# Patient Record
Sex: FEMALE | Race: BLACK OR AFRICAN AMERICAN | Employment: UNEMPLOYED | URBAN - METROPOLITAN AREA
[De-identification: names, ages, dates, MRNs, and addresses within clinical notes are randomized per-mention and may not be internally consistent; named-entity substitution may affect disease eponyms.]

---

## 2017-01-03 ENCOUNTER — HOSPITAL ENCOUNTER (OUTPATIENT)
Dept: MAMMOGRAPHY | Age: 57
Discharge: HOME | End: 2017-01-03
Attending: PHYSICIAN ASSISTANT
Payer: MEDICARE

## 2017-01-03 DIAGNOSIS — M85.80 OSTEOPENIA: ICD-10-CM

## 2017-01-03 DIAGNOSIS — F11.988 OPIOID USE, UNSPECIFIED WITH OTHER OPIOID-INDUCED DISORDER (HCC): ICD-10-CM

## 2017-01-03 PROCEDURE — 77080 DXA BONE DENSITY AXIAL: CPT

## 2017-02-21 ENCOUNTER — OFFICE VISIT (OUTPATIENT)
Dept: PAIN MANAGEMENT | Age: 57
End: 2017-02-21

## 2017-02-21 VITALS — HEART RATE: 66 BPM | SYSTOLIC BLOOD PRESSURE: 175 MMHG | DIASTOLIC BLOOD PRESSURE: 88 MMHG

## 2017-02-21 DIAGNOSIS — M54.12 CERVICAL RADICULOPATHY: ICD-10-CM

## 2017-02-21 DIAGNOSIS — M48.02 SPINAL STENOSIS IN CERVICAL REGION: ICD-10-CM

## 2017-02-21 DIAGNOSIS — G89.4 CHRONIC PAIN SYNDROME: Primary | ICD-10-CM

## 2017-02-21 DIAGNOSIS — M50.30 DEGENERATION OF CERVICAL INTERVERTEBRAL DISC: ICD-10-CM

## 2017-02-21 DIAGNOSIS — M47.22 OSTEOARTHRITIS OF SPINE WITH RADICULOPATHY, CERVICAL REGION: ICD-10-CM

## 2017-03-07 ENCOUNTER — OFFICE VISIT (OUTPATIENT)
Dept: PAIN MANAGEMENT | Age: 57
End: 2017-03-07

## 2017-03-07 VITALS
DIASTOLIC BLOOD PRESSURE: 93 MMHG | SYSTOLIC BLOOD PRESSURE: 158 MMHG | HEIGHT: 71 IN | BODY MASS INDEX: 31.64 KG/M2 | HEART RATE: 87 BPM | WEIGHT: 226 LBS

## 2017-03-07 DIAGNOSIS — M54.81 BILATERAL OCCIPITAL NEURALGIA: ICD-10-CM

## 2017-03-07 DIAGNOSIS — M47.22 OSTEOARTHRITIS OF SPINE WITH RADICULOPATHY, CERVICAL REGION: ICD-10-CM

## 2017-03-07 DIAGNOSIS — G24.3 SPASMODIC TORTICOLLIS: ICD-10-CM

## 2017-03-07 DIAGNOSIS — M54.12 CERVICAL RADICULOPATHY: ICD-10-CM

## 2017-03-07 DIAGNOSIS — M50.30 DEGENERATION OF CERVICAL INTERVERTEBRAL DISC: ICD-10-CM

## 2017-03-07 DIAGNOSIS — G24.3 CERVICAL DYSTONIA: ICD-10-CM

## 2017-03-07 DIAGNOSIS — M62.838 MUSCLE SPASMS OF NECK: ICD-10-CM

## 2017-03-07 DIAGNOSIS — M54.2 CERVICALGIA: Primary | ICD-10-CM

## 2017-03-07 DIAGNOSIS — M54.12 BRACHIAL NEURITIS: ICD-10-CM

## 2017-03-07 RX ORDER — FENTANYL 25 UG/1
1 PATCH TRANSDERMAL
Qty: 15 PATCH | Refills: 0 | Status: SHIPPED | OUTPATIENT
Start: 2017-05-13 | End: 2017-06-02 | Stop reason: SDUPTHER

## 2017-03-07 RX ORDER — FENTANYL 25 UG/1
1 PATCH TRANSDERMAL
Qty: 15 PATCH | Refills: 0 | Status: SHIPPED | OUTPATIENT
Start: 2017-04-14 | End: 2017-03-07 | Stop reason: SDUPTHER

## 2017-03-07 RX ORDER — BUPIVACAINE HYDROCHLORIDE 5 MG/ML
3 INJECTION, SOLUTION EPIDURAL; INTRACAUDAL
Qty: 3 ML | Refills: 0
Start: 2017-03-07 | End: 2017-03-07

## 2017-03-07 RX ORDER — FENTANYL 100 UG/H
1 PATCH TRANSDERMAL
Qty: 15 PATCH | Refills: 0 | Status: SHIPPED | OUTPATIENT
Start: 2017-03-16 | End: 2017-03-07 | Stop reason: SDUPTHER

## 2017-03-07 RX ORDER — FENTANYL 25 UG/1
1 PATCH TRANSDERMAL
Qty: 15 PATCH | Refills: 0 | Status: SHIPPED | OUTPATIENT
Start: 2017-03-16 | End: 2017-03-07 | Stop reason: SDUPTHER

## 2017-03-07 RX ORDER — FENTANYL 100 UG/H
1 PATCH TRANSDERMAL
Qty: 15 PATCH | Refills: 0 | Status: SHIPPED | OUTPATIENT
Start: 2017-04-14 | End: 2017-03-07 | Stop reason: SDUPTHER

## 2017-03-07 RX ORDER — OXYCODONE AND ACETAMINOPHEN 10; 325 MG/1; MG/1
1 TABLET ORAL
Qty: 120 TAB | Refills: 0 | Status: SHIPPED | OUTPATIENT
Start: 2017-05-13 | End: 2017-06-02 | Stop reason: SDUPTHER

## 2017-03-07 RX ORDER — FENTANYL 100 UG/H
1 PATCH TRANSDERMAL
Qty: 15 PATCH | Refills: 0 | Status: SHIPPED | OUTPATIENT
Start: 2017-05-13 | End: 2017-06-02 | Stop reason: SDUPTHER

## 2017-03-07 RX ORDER — OXYCODONE AND ACETAMINOPHEN 10; 325 MG/1; MG/1
1 TABLET ORAL
Qty: 120 TAB | Refills: 0 | Status: SHIPPED | OUTPATIENT
Start: 2017-04-14 | End: 2017-03-07 | Stop reason: SDUPTHER

## 2017-03-07 RX ORDER — OXYCODONE AND ACETAMINOPHEN 10; 325 MG/1; MG/1
1 TABLET ORAL
Qty: 120 TAB | Refills: 0 | Status: SHIPPED | OUTPATIENT
Start: 2017-03-16 | End: 2017-03-07 | Stop reason: SDUPTHER

## 2017-03-07 NOTE — PATIENT INSTRUCTIONS
Plan:  Continue same medications as prescribed for chronic pain  Trigger point injection performed today for muscle spasms in the neck and shoulders  Weight bearing exercise and a diet rich in calcium and vitamin D are the best (non-pharmacologic) ways to slow bone loss and reduce the risk of fracture  We will arrange a repeat cervical MARILOU with Dr. Vega Farnsworth   Follow up in 3 months or sooner if needed  Regular exercise and attention to emotional health and diet remain the most effective ways to treat chronic pain of all kinds  You may contact me with questions or concerns through [employer]    Post Injection Instructions    If you develop any abnormal symptoms, such as itching, swelling, redness, rash, or shortness of breath, please call our office. Normally, these are temporary symptoms, which resolve within several hours to a day, but our office is more than happy to answer any questions you may have. The provider would like you to observe the injection area for redness, swelling, or increased heat. If any or all of these reactions occur, please call our office as soon as possible. This reaction may indicate the first signs of infection. Although very rare, it is  best if caught early. I have reviewed these instructions and the patient verbalizes understanding.

## 2017-03-07 NOTE — PROGRESS NOTES
Nursing Notes    Patient presents to the office today in follow-up. Patient rates her pain at 8/10 on the numerical pain scale. Reviewed medications with counts as follows:    Rx Date filled Qty Dispensed Pill Count Last Dose Short   Fentanyl 100 mcg/hr 02/15/17 15 5 Current patch on right chest wall no   Fentanyl 25 mcg/hr 02/15/17 15 5 Current patch on right chest wall no   Percocet 10/325 mg  02/15/17 120 37 today no                  POC UDS was not performed in office today    Any new labs or imaging since last appointment? NO    Have you been to an emergency room (ER) or urgent care clinic since your last visit? NO            Have you been hospitalized since your last visit? NO     If yes, where, when, and reason for visit? Have you seen or consulted any other health care providers outside of the [address] [place] [address]  since your last visit? YES     If yes, where, when, and reason for visit?pcp    HM deferred to pcp.

## 2017-03-07 NOTE — PROGRESS NOTES
HISTORY OF PRESENT ILLNESS  Nubia Montanez is a 61 y.o. female. . Patient presents for follow up of chronic neck pain due to cervical spondylosis and stenosis secondary to an industrial accident sustained several years ago. She complains of a 6-week history of left knee pain and stiffness. She consulted with her PCP, who performed x-rays and discovered mild to moderate knee arthritis but did not propose any treatment because she is prescribed pain medications from our practice. She is being referred to an orthopedist. We are not permitted to address this pain complaint as it is not related to work injury. We discussed her recent DEXA, which showed osteopenia (T-score of -1.2 to -1.5). We discussed ways to reduce the likelihood of fracture and to slow bone loss ( weight-bearing exercise, dietary advice, etc). We will recheck in about 2 years. She has also been under a great deal of stress related to her aunt in [place], who is suffering with a number of health crises. She has traveled back and forth to [place] to help care for her, which has worsened her pain. She also complains of worsening neck pain and spasms due to cervical spondylosis and cervical dystonia. She requests to schedule a repeat cervical MARILOU, which has provided 50% relief for about a month. She does not wish to proceed with spinal cord stimulator at this time, but we discussed that this may be the most successful option for addressing her severe, chronic neck pain if epidurals, PT, medications, and TPIs do not control symptoms. She requests repeat TPIs today, which have been helpful in the past for spasms and neck stiffness. Pt reports average of 8/10 pain scale most days. Medications continue to offer marginal benefit for pain. Nubia Montanez is tolerating medications well, with no untoward side effects noted. Sheis able to stay more active with less discomfort with these current doses.  The patient reports an average of 50 % relief with this regimen. Most recent UDS and  were consistent with prescribed medications. Pill counts are appropriate. Chris is informed of side effects, risks, and benefits of this regimen, and emphasizes that she derives a significant improvement in functionality and quality of life, and notes that non-opioid medications and therapies in the past have not offered significant benefit. We discussed weaning on her regimen, but she declines, stating, \"this is the best thing I've ever had for my pain. \"  She denies new or worsening insomnia or constipation issues. She denies any falls, injuries, or hospitalizations since the last visit. A total of 50 minutes was spent with the patient of which more than 50% of the time was spent counseling the patient. [facility]  OFFICE PROCEDURE PROGRESS NOTE        Chart reviewed for the following:   Jasmin ALEMAN PA-C, have reviewed the History, Physical and updated the Allergic reactions for [address] [place] [address] Christian performed immediately prior to start of procedure:   Guicho ALEMAN PA-C, have performed the following reviews on [facility] prior to the start of the procedure:            * Patient was identified by name and date of birth   * Agreement on procedure being performed was verified  * Risks and Benefits explained to the patient  * Procedure site verified and marked as necessary  * Patient was positioned for comfort  * Consent was signed and verified     Time: 0899      Date of procedure: 3/7/2017    Procedure performed by:  Guicho Sheikh PA-C    Provider assisted by: Ramona Trejo LPN    Patient assisted by: self    How tolerated by patient: tolerated the procedure well with no complications    Post Procedural Pain Scale: 6 - Hurts Even More    Comments: none    PAIN SCALE PRIOR TO PROCEDURE: 7-8/10    Pt presents for trigger point injections due to neck pain and chronic migraines.  Procedure was explained in detail and all questions answered to the satisfaction of the patient. Consent forms were signed by the patient. Attention was first directed to the occipital prominences. Approximately 1.5 ml of bupivicaine 0.5% instilled into each these areas using 30 g 1 \" needle. Next, approximately 2 ml was injected into the cervical paraspinous muscles in four discrete areas. Next, approximately 10 ml of bupivicaine was injected into the trapezius muscles at areas of palpable muscle nodularity. Pt tolerated the procedure well. PAIN SCALE AFTER PROCEDURE: 6/10       HPI--see above    ROS  ENT: Positive for neck pain (s/p cervical MARILOU, >50% relief ). Respiratory: Negative. Cardiovascular: Negative. Gastrointestinal: Negative for nausea, vomiting and constipation (controlled with Amitiza ). Genitourinary: Negative. Musculoskeletal: Positive for back pain (thoracic ) and joint pain. Negative for myalgias and falls. Muscle cramps (in lower legs)    Psychiatric/Behavioral: Positive for depression and anxiety, negative for suicidal ideas, hallucinations and substance abuse. The patient has insomnia   Physical Exam  Head: Normocephalic and atraumatic. Eyes: Pupils are equal, round, and reactive to light. Right eye exhibits no discharge. Left eye exhibits no discharge. Glasses   Pulmonary/Chest: Effort normal. No respiratory distress. Musculoskeletal: She exhibits tenderness. She exhibits no edema. Cervical back: She exhibits decreased range of motion and tenderness. Back:  palpable areas of muscle nodularity and tightness along the upper ridge of the trapezius muscle on the left  Neurological: She is alert and oriented to person, place, and time. No cranial nerve deficit (grossly intact). Skin: Skin is warm and dry. She is not diaphoretic. No erythema. Psychiatric: She has a normal mood and affect. Her behavior is normal. Judgment and thought content normal.   ASSESSMENT and PLAN    ICD-10-CM ICD-9-CM    1. Cervicalgia M54.2 723.1 AK INJECT TRIGGER POINTS, > 3   2. Cervical dystonia G24.3 333.83 AK INJECT TRIGGER POINTS, > 3      bupivacaine, PF, (MARCAINE, PF,) 0.5 % (5 mg/mL) injection      AK INJECT NERV BLCK,GREAT OCCIPTL   3. Bilateral occipital neuralgia M54.81 723.8 AK INJECT TRIGGER POINTS, > 3      bupivacaine, PF, (MARCAINE, PF,) 0.5 % (5 mg/mL) injection      AK INJECT NERV BLCK,GREAT OCCIPTL   4. Muscle spasms of neck M62.838 728.85 AK INJECT TRIGGER POINTS, > 3      bupivacaine, PF, (MARCAINE, PF,) 0.5 % (5 mg/mL) injection      AK INJECT NERV BLCK,GREAT OCCIPTL   5. Spasmodic torticollis G24.3 333.83    6. Brachial neuritis M54.12 723.4    7. Cervical radiculopathy M54.12 723.4    8. Osteoarthritis of spine with radiculopathy, cervical region M47.22 721.0    9.  Degeneration of cervical intervertebral disc M50.30 722.4       Plan:  Continue same medications as prescribed for chronic pain  Trigger point injection performed today for muscle spasms in the neck and shoulders  Weight bearing exercise and a diet rich in calcium and vitamin D are the best (non-pharmacologic) ways to slow bone loss and reduce the risk of fracture  We will arrange a repeat cervical MARILOU with Dr. Ramona Bower   Follow up in 3 months or sooner if needed  Regular exercise and attention to emotional health and diet remain the most effective ways to treat chronic pain of all kinds  You may contact me with questions or concerns through [employer]

## 2017-03-07 NOTE — MR AVS SNAPSHOT
Visit Information Date & Time Provider Department Dept. Phone Encounter #  
 3/7/2017  1:40 PM [facility] [phone] [id number] Follow-up Instructions Return in about 3 months (around 6/7/2017). Follow-up and Disposition History Upcoming Health Maintenance Date Due Hepatitis C Screening 1960 DTaP/Tdap/Td series (1 - Tdap) 6/29/1980 PAP AKA CERVICAL CYTOLOGY 6/29/1980 BREAST CANCER SCRN MAMMOGRAM 6/29/2009 FOBT Q 1 YEAR AGE 50-75 6/29/2009 INFLUENZA AGE 9 TO ADULT 8/1/2016 Allergies as of 3/7/2017  Review Complete On: 3/7/2017 By: Michael Joyce LPN Severity Noted Reaction Type Reactions Oxycontin [Oxycodone]   Intolerance Not Reported This Time Pt states not allergic to oxycontin Current Immunizations  Never Reviewed No immunizations on file. Not reviewed this visit You Were Diagnosed With   
  
 Codes Comments Cervicalgia    -  Primary ICD-10-CM: M54.2 ICD-9-CM: 723.1 Cervical dystonia     ICD-10-CM: G24.3 ICD-9-CM: 333.83 Bilateral occipital neuralgia     ICD-10-CM: M54.81 ICD-9-CM: 723.8 Muscle spasms of neck     ICD-10-CM: Z30.199 ICD-9-CM: 728.85 Spasmodic torticollis     ICD-10-CM: G24.3 ICD-9-CM: 333.83 Brachial neuritis     ICD-10-CM: M54.12 
ICD-9-CM: 723.4 Cervical radiculopathy     ICD-10-CM: M54.12 
ICD-9-CM: 723.4 Osteoarthritis of spine with radiculopathy, cervical region     ICD-10-CM: M47.22 
ICD-9-CM: 721.0 Degeneration of cervical intervertebral disc     ICD-10-CM: M50.30 ICD-9-CM: 722.4 Vitals BP Pulse Height(growth percentile) Weight(growth percentile) BMI OB Status (!) 158/93 87 5' 11\" (1.803 m) 226 lb (102.5 kg) 31.52 kg/m2 Hysterectomy Smoking Status Never Smoker Vitals History BMI and BSA Data Body Mass Index Body Surface Area 31.52 kg/m 2 2.27 m 2 Preferred Pharmacy Pharmacy Name Phone [facility] - [facility] "[facility] [phone] Your Updated Medication List  
  
   
This list is accurate as of: 3/7/17  2:33 PM.  Always use your most recent med list.  
  
  
  
  
 bupivacaine (PF) 0.5 % (5 mg/mL) injection Commonly known as:  MARCAINE (PF)  
3 mL by Peripheral Nerve Block route now for 1 dose. CRESTOR 10 mg tablet Generic drug:  rosuvastatin Take 10 mg by mouth nightly.  
  
 ergocalciferol 50,000 unit capsule Commonly known as:  ERGOCALCIFEROL Take 1 Cap by mouth as directed. Once  a week * fentaNYL 100 mcg/hr PATCH Commonly known as:  DURAGESIC  
1 Patch by TransDERmal route every fourty-eight (48) hours. Max Daily Amount: 1 Patch. for chronic, severe, refractory pain. Blanca Cartagena brands only * fentaNYL 100 mcg/hr PATCH Commonly known as:  DURAGESIC  
1 Patch by TransDERmal route every fourty-eight (48) hours for 30 days. Max Daily Amount: 1 Patch. for chronic, severe, refractory pain. Valle brands only  Indications: CHRONIC PAIN WITH OPIOID TOLERANCE, SEVERE PAIN WITH OPIOID TOLERANCE  
  
 * fentaNYL 25 mcg/hr PATCH Commonly known as:  DURAGESIC  
1 Patch by TransDERmal route every fourty-eight (48) hours for 30 days. Max Daily Amount: 1 Patch. for chronic, severe, refractory pain. Valle brands only  Indications: CHRONIC PAIN WITH OPIOID TOLERANCE, SEVERE PAIN WITH OPIOID TOLERANCE  
  
 * fentaNYL 25 mcg/hr PATCH Commonly known as:  DURAGESIC  
1 Patch by TransDERmal route every fourty-eight (48) hours for 30 days. Max Daily Amount: 1 Patch. for chronic, severe, refractory pain. Valle brands only  Indications: CHRONIC PAIN WITH OPIOID TOLERANCE, SEVERE PAIN WITH OPIOID TOLERANCE  
  
 * fentaNYL 100 mcg/hr PATCH Commonly known as:  DURAGESIC  
1 Patch by TransDERmal route every fourty-eight (48) hours for 30 days. Max Daily Amount: 1 Patch. for chronic, severe, refractory pain. Valle brands only  Indications: CHRONIC PAIN WITH OPIOID TOLERANCE, SEVERE PAIN WITH OPIOID TOLERANCE  
  
 * fentaNYL 100 mcg/hr PATCH Commonly known as:  DURAGESIC  
1 Patch by TransDERmal route every fourty-eight (48) hours for 30 days. Max Daily Amount: 1 Patch. for chronic, severe, refractory pain. Valle brands only  Indications: Chronic Pain with Opioid Tolerance, SEVERE PAIN WITH OPIOID TOLERANCE Start taking on:  5/13/2017 * fentaNYL 25 mcg/hr PATCH Commonly known as:  DURAGESIC  
1 Patch by TransDERmal route every fourty-eight (48) hours for 30 days. Max Daily Amount: 1 Patch. for chronic, severe, refractory pain. Valle brands only  Indications: Chronic Pain with Opioid Tolerance, SEVERE PAIN WITH OPIOID TOLERANCE Start taking on:  5/13/2017  
  
 naloxegol 25 mg Tab tablet Commonly known as:  Velton Moh Take 1 Tab by mouth daily. Take daily before breakfast for opioid induced constipation  Indications: OPIOID-INDUCED CONSTIPATION  
  
 nebivolol 5 mg tablet Commonly known as:  BYSTOLIC Take 1 Tab by mouth nightly. For migraine prevention and hypertension NORVASC 10 mg tablet Generic drug:  amLODIPine Take  by mouth daily. pantoprazole 40 mg tablet Commonly known as:  PROTONIX Take 2 tabs daily. May take an occasional third tablet as needed. * oxyCODONE-acetaminophen  mg per tablet Commonly known as:  PERCOCET Take 1 Tab by mouth four (4) times daily as needed for Pain for up to 30 days. Max Daily Amount: 4 Tabs. Indications: PAIN  
  
 * PERCOCET  mg per tablet Generic drug:  oxyCODONE-acetaminophen Take 1 Tab by mouth four (4) times daily as needed for Pain for up to 30 days. Max Daily Amount: 4 Tabs. Indications: PAIN  
  
 * oxyCODONE-acetaminophen  mg per tablet Commonly known as:  PERCOCET  
 Take 1 Tab by mouth four (4) times daily as needed for Pain for up to 30 days. Max Daily Amount: 4 Tabs. Indications: Pain Start taking on:  2017 SYNTHROID 175 mcg tablet Generic drug:  levothyroxine Take 200 mcg by mouth Daily (before breakfast). TENS Units Earline Penasarah Commonly known as:  TENS 502  
1 Units by Does Not Apply route as directed. Transparent Dressings 3 1/2 X 4 \" Bndg Commonly known as:  TEGADERM  
2 Patches by Apply Externally route every fourty-eight (48) hours. Dispense two boxes XANAX 0.5 mg tablet Generic drug:  ALPRAZolam  
Take 1 mg by mouth three (3) times daily as needed. Take 1- 1/2 tabs tid prn * Notice: This list has 10 medication(s) that are the same as other medications prescribed for you. Read the directions carefully, and ask your doctor or other care provider to review them with you. Prescriptions Printed Refills  
 fentaNYL (DURAGESIC) 100 mcg/hr PATCH 0 Starting on: 2017 Si Patch by TransDERmal route every fourty-eight (48) hours for 30 days. Max Daily Amount: 1 Patch. for chronic, severe, refractory pain. Valle brands only  Indications: Chronic Pain with Opioid Tolerance, SEVERE PAIN WITH OPIOID TOLERANCE Class: Print Route: TransDERmal  
 fentaNYL (DURAGESIC) 25 mcg/hr PATCH 0 Starting on: 2017 Si Patch by TransDERmal route every fourty-eight (48) hours for 30 days. Max Daily Amount: 1 Patch. for chronic, severe, refractory pain. Valle brands only  Indications: Chronic Pain with Opioid Tolerance, SEVERE PAIN WITH OPIOID TOLERANCE Class: Print Route: TransDERmal  
 oxyCODONE-acetaminophen (PERCOCET)  mg per tablet 0 Starting on: 2017 Sig: Take 1 Tab by mouth four (4) times daily as needed for Pain for up to 30 days. Max Daily Amount: 4 Tabs. Indications: Pain Class: Print Route: Oral  
  
We Performed the Following KS INJECT NERV BLCK,GREAT OCCIPTL [id number] CPT(R)] KS INJECT TRIGGER POINTS, > 3 [id number] CPT(R)] Follow-up Instructions Return in about 3 months (around 6/7/2017). Patient Instructions Plan: 
Continue same medications as prescribed for chronic pain Trigger point injection performed today for muscle spasms in the neck and shoulders Weight bearing exercise and a diet rich in calcium and vitamin D are the best (non-pharmacologic) ways to slow bone loss and reduce the risk of fracture We will arrange a repeat cervical MARILOU with Dr. Jaimee Kerr Follow up in 3 months or sooner if needed Regular exercise and attention to emotional health and diet remain the most effective ways to treat chronic pain of all kinds You may contact me with questions or concerns through [address] Post Injection Instructions If you develop any abnormal symptoms, such as itching, swelling, redness, rash, or shortness of breath, please call our office. Normally, these are temporary symptoms, which resolve within several hours to a day, but our office is more than happy to answer any questions you may have. The provider would like you to observe the injection area for redness, swelling, or increased heat. If any or all of these reactions occur, please call our office as soon as possible. This reaction may indicate the first signs of infection. Although very rare, it is  best if caught early. I have reviewed these instructions and the patient verbalizes understanding. Patient Instructions History Please provide this summary of care documentation to your next provider. Your primary care clinician is listed as Na Waddell. If you have any questions after today's visit, please call [phone].

## 2017-03-21 ENCOUNTER — HOSPITAL ENCOUNTER (OUTPATIENT)
Age: 57
Setting detail: OUTPATIENT SURGERY
Discharge: HOME | End: 2017-03-21
Attending: PHYSICAL MEDICINE & REHABILITATION | Admitting: PHYSICAL MEDICINE & REHABILITATION

## 2017-03-21 ENCOUNTER — APPOINTMENT (OUTPATIENT)
Dept: GENERAL RADIOLOGY | Age: 57
End: 2017-03-21
Attending: EMERGENCY MEDICINE
Payer: MEDICARE

## 2017-03-21 ENCOUNTER — HOSPITAL ENCOUNTER (EMERGENCY)
Age: 57
Discharge: HOME | End: 2017-03-21
Attending: EMERGENCY MEDICINE
Payer: MEDICARE

## 2017-03-21 ENCOUNTER — APPOINTMENT (OUTPATIENT)
Dept: GENERAL RADIOLOGY | Age: 57
End: 2017-03-21
Attending: PHYSICAL MEDICINE & REHABILITATION

## 2017-03-21 ENCOUNTER — SURGERY (OUTPATIENT)
Age: 57
End: 2017-03-21

## 2017-03-21 VITALS
HEART RATE: 62 BPM | SYSTOLIC BLOOD PRESSURE: 124 MMHG | DIASTOLIC BLOOD PRESSURE: 69 MMHG | OXYGEN SATURATION: 94 % | TEMPERATURE: 97.7 F | RESPIRATION RATE: 16 BRPM

## 2017-03-21 VITALS
RESPIRATION RATE: 14 BRPM | HEIGHT: 71 IN | OXYGEN SATURATION: 99 % | WEIGHT: 227 LBS | TEMPERATURE: 98.4 F | DIASTOLIC BLOOD PRESSURE: 110 MMHG | SYSTOLIC BLOOD PRESSURE: 183 MMHG | BODY MASS INDEX: 31.78 KG/M2 | HEART RATE: 59 BPM

## 2017-03-21 DIAGNOSIS — R51.9 HEADACHE, UNSPECIFIED HEADACHE TYPE: Primary | ICD-10-CM

## 2017-03-21 DIAGNOSIS — I10 ESSENTIAL HYPERTENSION: ICD-10-CM

## 2017-03-21 LAB
ALBUMIN SERPL BCP-MCNC: 3.8 G/DL (ref 3.4–5)
ALBUMIN/GLOB SERPL: 1.1 {RATIO} (ref 0.8–1.7)
ALP SERPL-CCNC: 130 U/L (ref 45–117)
ALT SERPL-CCNC: 21 U/L (ref 13–56)
ANION GAP BLD CALC-SCNC: 8 MMOL/L (ref 3–18)
AST SERPL W P-5'-P-CCNC: 22 U/L (ref 15–37)
BASOPHILS # BLD AUTO: 0 K/UL (ref 0–0.1)
BASOPHILS # BLD: 0 % (ref 0–2)
BILIRUB SERPL-MCNC: 0.4 MG/DL (ref 0.2–1)
BUN SERPL-MCNC: 16 MG/DL (ref 7–18)
BUN/CREAT SERPL: 19 (ref 12–20)
CALCIUM SERPL-MCNC: 9 MG/DL (ref 8.5–10.1)
CHLORIDE SERPL-SCNC: 104 MMOL/L (ref 100–108)
CK MB CFR SERPL CALC: 0.5 % (ref 0–4)
CK MB SERPL-MCNC: 1.1 NG/ML (ref 5–25)
CK SERPL-CCNC: 204 U/L (ref 26–192)
CO2 SERPL-SCNC: 29 MMOL/L (ref 21–32)
CREAT SERPL-MCNC: 0.86 MG/DL (ref 0.6–1.3)
DIFFERENTIAL METHOD BLD: NORMAL
EOSINOPHIL # BLD: 0.1 K/UL (ref 0–0.4)
EOSINOPHIL NFR BLD: 1 % (ref 0–5)
ERYTHROCYTE [DISTWIDTH] IN BLOOD BY AUTOMATED COUNT: 12.9 % (ref 11.6–14.5)
GLOBULIN SER CALC-MCNC: 3.5 G/DL (ref 2–4)
GLUCOSE SERPL-MCNC: 102 MG/DL (ref 74–99)
HCT VFR BLD AUTO: 42.3 % (ref 35–45)
HGB BLD-MCNC: 14 G/DL (ref 12–16)
LYMPHOCYTES # BLD AUTO: 41 % (ref 21–52)
LYMPHOCYTES # BLD: 2.5 K/UL (ref 0.9–3.6)
MAGNESIUM SERPL-MCNC: 2.4 MG/DL (ref 1.8–2.4)
MCH RBC QN AUTO: 30.9 PG (ref 24–34)
MCHC RBC AUTO-ENTMCNC: 33.1 G/DL (ref 31–37)
MCV RBC AUTO: 93.4 FL (ref 74–97)
MONOCYTES # BLD: 0.5 K/UL (ref 0.05–1.2)
MONOCYTES NFR BLD AUTO: 8 % (ref 3–10)
NEUTS SEG # BLD: 2.9 K/UL (ref 1.8–8)
NEUTS SEG NFR BLD AUTO: 50 % (ref 40–73)
PLATELET # BLD AUTO: 225 K/UL (ref 135–420)
PMV BLD AUTO: 10.9 FL (ref 9.2–11.8)
POTASSIUM SERPL-SCNC: 3.5 MMOL/L (ref 3.5–5.5)
PROT SERPL-MCNC: 7.3 G/DL (ref 6.4–8.2)
RBC # BLD AUTO: 4.53 M/UL (ref 4.2–5.3)
SODIUM SERPL-SCNC: 141 MMOL/L (ref 136–145)
TROPONIN I SERPL-MCNC: <0.02 NG/ML (ref 0–0.04)
WBC # BLD AUTO: 5.9 K/UL (ref 4.6–13.2)

## 2017-03-21 PROCEDURE — 93005 ELECTROCARDIOGRAM TRACING: CPT

## 2017-03-21 PROCEDURE — 99285 EMERGENCY DEPT VISIT HI MDM: CPT

## 2017-03-21 PROCEDURE — 83735 ASSAY OF MAGNESIUM: CPT | Performed by: EMERGENCY MEDICINE

## 2017-03-21 PROCEDURE — 96365 THER/PROPH/DIAG IV INF INIT: CPT

## 2017-03-21 PROCEDURE — 82550 ASSAY OF CK (CPK): CPT | Performed by: EMERGENCY MEDICINE

## 2017-03-21 PROCEDURE — 80053 COMPREHEN METABOLIC PANEL: CPT | Performed by: EMERGENCY MEDICINE

## 2017-03-21 PROCEDURE — 74011250636 HC RX REV CODE- 250/636: Performed by: EMERGENCY MEDICINE

## 2017-03-21 PROCEDURE — 96375 TX/PRO/DX INJ NEW DRUG ADDON: CPT

## 2017-03-21 PROCEDURE — 71010 XR CHEST SNGL V: CPT

## 2017-03-21 PROCEDURE — 85025 COMPLETE CBC W/AUTO DIFF WBC: CPT | Performed by: EMERGENCY MEDICINE

## 2017-03-21 RX ORDER — FENTANYL CITRATE 50 UG/ML
25-400 INJECTION, SOLUTION INTRAMUSCULAR; INTRAVENOUS
Status: DISCONTINUED | OUTPATIENT
Start: 2017-03-21 | End: 2017-03-21 | Stop reason: HOSPADM

## 2017-03-21 RX ORDER — SODIUM CHLORIDE 0.9 % (FLUSH) 0.9 %
5-10 SYRINGE (ML) INJECTION AS NEEDED
Status: DISCONTINUED | OUTPATIENT
Start: 2017-03-21 | End: 2017-03-21 | Stop reason: HOSPADM

## 2017-03-21 RX ORDER — MAGNESIUM SULFATE HEPTAHYDRATE 40 MG/ML
2 INJECTION, SOLUTION INTRAVENOUS
Status: COMPLETED | OUTPATIENT
Start: 2017-03-21 | End: 2017-03-21

## 2017-03-21 RX ORDER — MIDAZOLAM HYDROCHLORIDE 1 MG/ML
.5-6 INJECTION, SOLUTION INTRAMUSCULAR; INTRAVENOUS
Status: DISCONTINUED | OUTPATIENT
Start: 2017-03-21 | End: 2017-03-21 | Stop reason: HOSPADM

## 2017-03-21 RX ORDER — PROCHLORPERAZINE EDISYLATE 5 MG/ML
10 INJECTION INTRAMUSCULAR; INTRAVENOUS
Status: COMPLETED | OUTPATIENT
Start: 2017-03-21 | End: 2017-03-21

## 2017-03-21 RX ORDER — KETOROLAC TROMETHAMINE 30 MG/ML
30 INJECTION, SOLUTION INTRAMUSCULAR; INTRAVENOUS ONCE
Status: COMPLETED | OUTPATIENT
Start: 2017-03-21 | End: 2017-03-21

## 2017-03-21 RX ADMIN — MAGNESIUM SULFATE HEPTAHYDRATE 2 G: 40 INJECTION, SOLUTION INTRAVENOUS at 12:37

## 2017-03-21 RX ADMIN — KETOROLAC TROMETHAMINE 30 MG: 30 INJECTION, SOLUTION INTRAMUSCULAR at 12:38

## 2017-03-21 RX ADMIN — SODIUM CHLORIDE 1000 ML: 900 INJECTION, SOLUTION INTRAVENOUS at 12:38

## 2017-03-21 RX ADMIN — PROCHLORPERAZINE EDISYLATE 10 MG: 5 INJECTION INTRAMUSCULAR; INTRAVENOUS at 12:38

## 2017-03-21 NOTE — PERIOP NOTES
Pt vital signs taken. Pt blood pressure 183/110. Pt states that she has a headache that is also causing pain in her eyes. Spoke with Dr. Daniel Andino regarding patient's blood pressure and symptoms. Dr. Daniel Andino speaks with patient and requests patient to be seen in the ER. Code green initiated. 911 called.

## 2017-03-21 NOTE — INTERVAL H&P NOTE
H&P Update:  Ismael Humphrey was seen and examined. History and physical has been reviewed. The patient has been examined.  There have been no significant clinical changes since the completion of the originally dated History and Physical.    Signed By: Lorin Tucker MD     March 21, 2017 9:40 AM

## 2017-03-21 NOTE — PROGRESS NOTES
21 Mar 2017, 10:30AM. Called to the holding area of the [facility] procedure suite, patient was awaiting, completing pre-procedure intake. I was notified by staff that pt's /110, she had all over h/a, nausea w/out vomiting, ill appearing. I confirmed all of this. Discussed with pt. She had been having h/a and sx since yesterday. Denies double vision or blurring vision, endorses mild chest pressure, and 'reflux'. Headache 'all over.'  Conversive, although distresed. I instructed her to go accompanied by staff to the [facility] ER for further evaluation.   Escorted via ambulance to [facility] ER.  [facility]

## 2017-03-21 NOTE — PERIOP NOTES
Pt being escorted from the [facility] with EMT staff to the emergency department. Last vitals taken were pulse 72, blood pressure 195/94, and SPO2 97%.

## 2017-03-21 NOTE — ED PROVIDER NOTES
HPI Comments: 11:28 AM Allan Holland is a 61 y.o. female with a h/o HTN presents to the ED via EMs with c/o progressively worsening constant frontal HA onset yesterday that feels similar to her previous HA's from her HTN. Pt states that she did not eat yesterday, and that she takes Norvasc and did take it this morning as described. Pt reports nausea, chest tightness, photophobia, and chills. Pt denies fever, numbness, tingling, slurred speech, visual changes, trauma, vomiting, diarrhea, or cough. All other sx denied. No other complaints at this time. Divya Gibbs MD      The history is provided by the patient. Past Medical History:   Diagnosis Date    Brachial neuritis or radiculitis NOS     Calculus of kidney     Cervicalgia     Degeneration of cervical intervertebral disc     Depression     GERD (gastroesophageal reflux disease)     Hypertension     Pancreatitis 2012    Thyroid cancer ([address]        Past Surgical History:   Procedure Laterality Date    HX BREAST BIOPSY      Negative    HX CHOLECYSTECTOMY      HX HYSTERECTOMY      HX LITHOTRIPSY      HX OOPHORECTOMY      HX THYROIDECTOMY      Partial thyroidectomy         Family History:   Problem Relation Age of Onset    Diabetes Mother     Breast Cancer Mother     Heart Attack Other     Diabetes Sister     Diabetes Brother        Social History     Social History    Marital status:      Spouse name: N/A    Number of children: N/A    Years of education: N/A     Occupational History    Not on file. Social History Main Topics    Smoking status: Never Smoker    Smokeless tobacco: Not on file    Alcohol use No    Drug use: No    Sexual activity: Yes     Birth control/ protection: Surgical      Comment: Hysterectomy     Other Topics Concern    Not on file     Social History Narrative         ALLERGIES: Oxycontin [oxycodone]    Review of Systems   Constitutional: Positive for chills.  Negative for fever and unexpected weight change. HENT: Negative for congestion and sore throat. Eyes: Positive for photophobia. Negative for redness and visual disturbance. Respiratory: Positive for chest tightness. Negative for cough and wheezing. Cardiovascular: Negative for chest pain, palpitations and leg swelling. Gastrointestinal: Negative for diarrhea. Genitourinary: Negative for dysuria. Musculoskeletal: Negative for neck stiffness. Skin: Negative for pallor. Neurological: Positive for headaches. Negative for numbness. Hematological: Does not bruise/bleed easily. All other systems reviewed and are negative. Vitals:    03/21/17 1300 03/21/17 1315 03/21/17 1330 03/21/17 1345   BP: 129/62 130/71 127/71 124/69   Pulse: (!) 58 (!) 57 (!) 55 62   Resp: 8 14 10 16   Temp:       SpO2:                Physical Exam   Constitutional: She is oriented to person, place, and time. She appears well-developed and well-nourished. No distress. HENT:   Head: Normocephalic and atraumatic. Mouth/Throat: Oropharynx is clear and moist.   Eyes: Conjunctivae and EOM are normal. Pupils are equal, round, and reactive to light. No scleral icterus. Mild photophobia on exam.    Neck: Trachea normal and normal range of motion. Neck supple. Cardiovascular: Normal rate, regular rhythm, S1 normal and S2 normal.  Exam reveals no gallop and no friction rub. No murmur heard. Pulmonary/Chest: Effort normal and breath sounds normal. No accessory muscle usage. No respiratory distress. Abdominal: Soft. Normal appearance. She exhibits no distension. There is no tenderness. There is no rigidity, no rebound and no guarding. Musculoskeletal: Normal range of motion. She exhibits no edema or tenderness. Neurological: She is alert and oriented to person, place, and time. She has normal strength. No cranial nerve deficit or sensory deficit. Coordination normal.   Skin: Skin is warm and intact. No rash noted.    Psychiatric: She has a normal mood and affect. Her speech is normal and behavior is normal.   Vitals reviewed. MDM  Number of Diagnoses or Management Options  Essential hypertension:   Headache, unspecified headache type:   Diagnosis management comments: Rachele Cox is a 61 y.o. Female coming in with a HA and concerns of HTN. Normal neuro exam. Not a thunderclap HA and no meningeal signs. No other red flags and I do not feel advanced imaging is necessary at this time. Patient was rechecked and is feeling much better and HTN improved to WNL with treatment of her HA and resolution of pain. Will d/c home to follow up with PCP.     ED Course       Procedures    Vitals:  Patient Vitals for the past 12 hrs:   Temp Pulse Resp BP SpO2   03/21/17 1345 - 62 16 124/69 -   03/21/17 1330 - (!) 55 10 127/71 -   03/21/17 1315 - (!) 57 14 130/71 -   03/21/17 1300 - (!) 58 8 129/62 -   03/21/17 1245 - 61 13 136/70 -   03/21/17 1244 - 61 19 - -   03/21/17 1230 - (!) 57 12 132/70 94 %   03/21/17 1216 - 65 20 - 98 %   03/21/17 1215 - 65 20 101/86 99 %   03/21/17 1211 - - - 148/79 -   03/21/17 1050 97.7 °F (36.5 °C) 71 16 (!) 189/105 97 %         Medications ordered:   Medications   sodium chloride 0.9 % bolus infusion 1,000 mL (0 mL IntraVENous IV Completed 3/21/17 1350)   prochlorperazine (COMPAZINE) injection 10 mg (10 mg IntraVENous Given 3/21/17 1238)   magnesium sulfate 2 g/50 ml IVPB (premix or compounded) (0 g IntraVENous IV Completed 3/21/17 1350)   ketorolac (TORADOL) injection 30 mg (30 mg IntraVENous Given 3/21/17 1238)         Lab findings:  Recent Results (from the past 12 hour(s))   EKG, 12 LEAD, INITIAL    Collection Time: 03/21/17 12:15 PM   Result Value Ref Range    Ventricular Rate 63 BPM    Atrial Rate 63 BPM    P-R Interval 176 ms    QRS Duration 92 ms    Q-T Interval 438 ms    QTC Calculation (Bezet) 448 ms    Calculated P Axis 67 degrees    Calculated R Axis 55 degrees    Calculated T Axis 60 degrees    Diagnosis       Normal sinus rhythm  Normal ECG  When compared with ECG of 15-AUG-2016 13:40,  No significant change was found     CBC WITH AUTOMATED DIFF    Collection Time: 03/21/17 12:29 PM   Result Value Ref Range    WBC 5.9 4.6 - 13.2 K/uL    RBC 4.53 4.20 - 5.30 M/uL    HGB 14.0 12.0 - 16.0 g/dL    HCT 42.3 35.0 - 45.0 %    MCV 93.4 74.0 - 97.0 FL    MCH 30.9 24.0 - 34.0 PG    MCHC 33.1 31.0 - 37.0 g/dL    RDW 12.9 11.6 - 14.5 %    PLATELET 042 962 - 830 K/uL    MPV 10.9 9.2 - 11.8 FL    NEUTROPHILS 50 40 - 73 %    LYMPHOCYTES 41 21 - 52 %    MONOCYTES 8 3 - 10 %    EOSINOPHILS 1 0 - 5 %    BASOPHILS 0 0 - 2 %    ABS. NEUTROPHILS 2.9 1.8 - 8.0 K/UL    ABS. LYMPHOCYTES 2.5 0.9 - 3.6 K/UL    ABS. MONOCYTES 0.5 0.05 - 1.2 K/UL    ABS. EOSINOPHILS 0.1 0.0 - 0.4 K/UL    ABS. BASOPHILS 0.0 0.0 - 0.1 K/UL    DF AUTOMATED     METABOLIC PANEL, COMPREHENSIVE    Collection Time: 03/21/17 12:29 PM   Result Value Ref Range    Sodium 141 136 - 145 mmol/L    Potassium 3.5 3.5 - 5.5 mmol/L    Chloride 104 100 - 108 mmol/L    CO2 29 21 - 32 mmol/L    Anion gap 8 3.0 - 18 mmol/L    Glucose 102 (H) 74 - 99 mg/dL    BUN 16 7.0 - 18 MG/DL    Creatinine 0.86 0.6 - 1.3 MG/DL    BUN/Creatinine ratio 19 12 - 20      GFR est AA >60 >60 ml/min/1.73m2    GFR est non-AA >60 >60 ml/min/1.73m2    Calcium 9.0 8.5 - 10.1 MG/DL    Bilirubin, total 0.4 0.2 - 1.0 MG/DL    ALT (SGPT) 21 13 - 56 U/L    AST (SGOT) 22 15 - 37 U/L    Alk.  phosphatase 130 (H) 45 - 117 U/L    Protein, total 7.3 6.4 - 8.2 g/dL    Albumin 3.8 3.4 - 5.0 g/dL    Globulin 3.5 2.0 - 4.0 g/dL    A-G Ratio 1.1 0.8 - 1.7     MAGNESIUM    Collection Time: 03/21/17 12:29 PM   Result Value Ref Range    Magnesium 2.4 1.8 - 2.4 mg/dL   CARDIAC PANEL,(CK, CKMB & TROPONIN)    Collection Time: 03/21/17 12:29 PM   Result Value Ref Range     (H) 26 - 192 U/L    CK - MB 1.1 <3.6 ng/ml    CK-MB Index 0.5 0.0 - 4.0 %    Troponin-I, Qt. <0.02 0.0 - 0.045 NG/ML       EKG interpretation by ED Physician: 12:21 PM  Sinus rhythm 63BPM, no acute ischemic changes. X-Ray, CT or other radiology findings or impressions:  XR CHEST SNGL V   Final Result   Impression:     No radiographic evidence of acute cardiopulmonary process     Reevaluation of patient: Patient resting comfortably in bed, states HA feels much better and would like to be discharged. Counseled on return precautions and follow up and patient verbally states understanding and agrees. Disposition:  Diagnosis:   1. Headache, unspecified headache type    2. Essential hypertension        Disposition: Discharged    Follow-up Information     Follow up With Details Comments Contact Info    Sandra Valentino MD Schedule an appointment as soon as possible for a visit  [address]      [facility]Y [facility]PT  As needed, If symptoms worsen [address] [place] [id number]  [phone]            Patient's Medications   Start Taking    No medications on file   Continue Taking    ALPRAZOLAM (XANAX) 0.5 MG TABLET    Take 1 mg by mouth three (3) times daily as needed. Take 1- 1/2 tabs tid prn    AMLODIPINE (NORVASC) 10 MG TABLET    Take  by mouth daily. ERGOCALCIFEROL (ERGOCALCIFEROL) 50,000 UNIT CAPSULE    Take 1 Cap by mouth as directed. Once  a week    FENTANYL (DURAGESIC) 100 MCG/HR PATCH    1 Patch by TransDERmal route every fourty-eight (48) hours. Max Daily Amount: 1 Patch. for chronic, severe, refractory pain. Valle brands only    FENTANYL (DURAGESIC) 100 MCG/HR PATCH    1 Patch by TransDERmal route every fourty-eight (48) hours for 30 days. Max Daily Amount: 1 Patch. for chronic, severe, refractory pain. Valle brands only  Indications: CHRONIC PAIN WITH OPIOID TOLERANCE, SEVERE PAIN WITH OPIOID TOLERANCE    FENTANYL (DURAGESIC) 100 MCG/HR PATCH    1 Patch by TransDERmal route every fourty-eight (48) hours for 30 days. Max Daily Amount: 1 Patch. for chronic, severe, refractory pain.  Valle brands only  Indications: CHRONIC PAIN WITH OPIOID TOLERANCE, SEVERE PAIN WITH OPIOID TOLERANCE    FENTANYL (DURAGESIC) 100 MCG/HR PATCH    1 Patch by TransDERmal route every fourty-eight (48) hours for 30 days. Max Daily Amount: 1 Patch. for chronic, severe, refractory pain. Valle brands only  Indications: Chronic Pain with Opioid Tolerance, SEVERE PAIN WITH OPIOID TOLERANCE    FENTANYL (DURAGESIC) 25 MCG/HR PATCH    1 Patch by TransDERmal route every fourty-eight (48) hours for 30 days. Max Daily Amount: 1 Patch. for chronic, severe, refractory pain. Valle brands only  Indications: CHRONIC PAIN WITH OPIOID TOLERANCE, SEVERE PAIN WITH OPIOID TOLERANCE    FENTANYL (DURAGESIC) 25 MCG/HR PATCH    1 Patch by TransDERmal route every fourty-eight (48) hours for 30 days. Max Daily Amount: 1 Patch. for chronic, severe, refractory pain. Valle brands only  Indications: CHRONIC PAIN WITH OPIOID TOLERANCE, SEVERE PAIN WITH OPIOID TOLERANCE    FENTANYL (DURAGESIC) 25 MCG/HR PATCH    1 Patch by TransDERmal route every fourty-eight (48) hours for 30 days. Max Daily Amount: 1 Patch. for chronic, severe, refractory pain. Valle brands only  Indications: Chronic Pain with Opioid Tolerance, SEVERE PAIN WITH OPIOID TOLERANCE    LEVOTHYROXINE (SYNTHROID) 175 MCG TABLET    Take 200 mcg by mouth Daily (before breakfast). NALOXEGOL (MOVANTIK) 25 MG TAB TABLET    Take 1 Tab by mouth daily. Take daily before breakfast for opioid induced constipation  Indications: OPIOID-INDUCED CONSTIPATION    NEBIVOLOL (BYSTOLIC) 5 MG TABLET    Take 1 Tab by mouth nightly. For migraine prevention and hypertension    OXYCODONE-ACETAMINOPHEN (PERCOCET)  MG PER TABLET    Take 1 Tab by mouth four (4) times daily as needed for Pain for up to 30 days. Max Daily Amount: 4 Tabs. Indications: PAIN    OXYCODONE-ACETAMINOPHEN (PERCOCET)  MG PER TABLET    Take 1 Tab by mouth four (4) times daily as needed for Pain for up to 30 days.  Max Daily Amount: 4 Tabs. Indications: Pain    PANTOPRAZOLE (PROTONIX) 40 MG TABLET    Take 2 tabs daily. May take an occasional third tablet as needed. PERCOCET  MG PER TABLET    Take 1 Tab by mouth four (4) times daily as needed for Pain for up to 30 days. Max Daily Amount: 4 Tabs. Indications: PAIN    ROSUVASTATIN (CRESTOR) 10 MG TABLET    Take 10 mg by mouth nightly. TENS UNITS (TENS 502) GERI    1 Units by Does Not Apply route as directed. TRANSPARENT DRESSINGS (TEGADERM) 3 1/2 X 4 \" BNDG    2 Patches by Apply Externally route every fourty-eight (48) hours. Dispense two boxes   These Medications have changed    No medications on file   Stop Taking    No medications on file     Scribe [id number] for and in the presence of Phill Allen MD 11:32 AM, 03/21/17. Physician Attestation  I personally performed the services described in the documentation, reviewed the documentation, as recorded by the scribe in my presence, and it accurately and completely records my words and actions.     Phill Allen MD 11:32 AM 03/21/17        Signed by : Vicky Downey, 03/21/17 at 11:32 AM

## 2017-03-21 NOTE — INTERVAL H&P NOTE
H&P Update:  Anh Kelly was seen and examined. History and physical has been reviewed. Significant clinical changes have occurred as noted:  Called to the holding area to evaluate patient. Elevated /110. Nausea w/out vomiting. Diffuse h/a. Denies double or blurred vision. C/o 'reflux', sx, mild chest pressure feeling. I instructed her to go accompanied to the [facility] ER for further evaluation. Called ambulance.  [facility]    Signed By: Reji Prakash MD     March 21, 2017 10:56 AM

## 2017-03-21 NOTE — PROCEDURES
NO LOS.  Patient escorted via ambulance to the [facility] ER . [facility]

## 2017-03-21 NOTE — DISCHARGE INSTRUCTIONS
Headache: Care Instructions  Your Care Instructions    Headaches have many possible causes. Most headaches aren't a sign of a more serious problem, and they will get better on their own. Home treatment may help you feel better faster. The doctor has checked you carefully, but problems can develop later. If you notice any problems or new symptoms, get medical treatment right away. Follow-up care is a key part of your treatment and safety. Be sure to make and go to all appointments, and call your doctor if you are having problems. It's also a good idea to know your test results and keep a list of the medicines you take. How can you care for yourself at home? · Do not drive if you have taken a prescription pain medicine. · Rest in a quiet, dark room until your headache is gone. Close your eyes and try to relax or go to sleep. Don't watch TV or read. · Put a cold, moist cloth or cold pack on the painful area for 10 to 20 minutes at a time. Put a thin cloth between the cold pack and your skin. · Use a warm, moist towel or a heating pad set on low to relax tight shoulder and neck muscles. · Have someone gently massage your neck and shoulders. · Take pain medicines exactly as directed. ¨ If the doctor gave you a prescription medicine for pain, take it as prescribed. ¨ If you are not taking a prescription pain medicine, ask your doctor if you can take an over-the-counter medicine. · Be careful not to take pain medicine more often than the instructions allow, because you may get worse or more frequent headaches when the medicine wears off. · Do not ignore new symptoms that occur with a headache, such as a fever, weakness or numbness, vision changes, or confusion. These may be signs of a more serious problem. To prevent headaches  · Keep a headache diary so you can figure out what triggers your headaches. Avoiding triggers may help you prevent headaches.  Record when each headache began, how long it lasted, and what the pain was like (throbbing, aching, stabbing, or dull). Write down any other symptoms you had with the headache, such as nausea, flashing lights or dark spots, or sensitivity to bright light or loud noise. Note if the headache occurred near your period. List anything that might have triggered the headache, such as certain foods (chocolate, cheese, wine) or odors, smoke, bright light, stress, or lack of sleep. · Find healthy ways to deal with stress. Headaches are most common during or right after stressful times. Take time to relax before and after you do something that has caused a headache in the past.  · Try to keep your muscles relaxed by keeping good posture. Check your jaw, face, neck, and shoulder muscles for tension, and try relaxing them. When sitting at a desk, change positions often, and stretch for 30 seconds each hour. · Get plenty of sleep and exercise. · Eat regularly and well. Long periods without food can trigger a headache. · Treat yourself to a massage. Some people find that regular massages are very helpful in relieving tension. · Limit caffeine by not drinking too much coffee, tea, or soda. But don't quit caffeine suddenly, because that can also give you headaches. · Reduce eyestrain from computers by blinking frequently and looking away from the computer screen every so often. Make sure you have proper eyewear and that your monitor is set up properly, about an arm's length away. · Seek help if you have depression or anxiety. Your headaches may be linked to these conditions. Treatment can both prevent headaches and help with symptoms of anxiety or depression. When should you call for help? Call 911 anytime you think you may need emergency care. For example, call if:  · You have signs of a stroke. These may include:  ¨ Sudden numbness, paralysis, or weakness in your face, arm, or leg, especially on only one side of your body. ¨ Sudden vision changes.   ¨ Sudden trouble speaking. ¨ Sudden confusion or trouble understanding simple statements. ¨ Sudden problems with walking or balance. ¨ A sudden, severe headache that is different from past headaches. Call your doctor now or seek immediate medical care if:  · You have a new or worse headache. · Your headache gets much worse. Where can you learn more? Go to http://angie-nile.info/. Enter M271 in the search box to learn more about \"Headache: Care Instructions. \"  Current as of: February 19, 2016  Content Version: 11.1  © 0969-8717 [employer]. Care instructions adapted under license by [employer] (which disclaims liability or warranty for this information). If you have questions about a medical condition or this instruction, always ask your healthcare professional. [address] any warranty or liability for your use of this information. High Blood Pressure: Care Instructions  Your Care Instructions  If your blood pressure is usually above 140/90, you have high blood pressure, or hypertension. That means the top number is 140 or higher or the bottom number is 90 or higher, or both. Despite what a lot of people think, high blood pressure usually doesn't cause headaches or make you feel dizzy or lightheaded. It usually has no symptoms. But it does increase your risk for heart attack, stroke, and kidney or eye damage. The higher your blood pressure, the more your risk increases. Your doctor will give you a goal for your blood pressure. Your goal will be based on your health and your age. An example of a goal is to keep your blood pressure below 140/90. Lifestyle changes, such as eating healthy and being active, are always important to help lower blood pressure. You might also take medicine to reach your blood pressure goal.  Follow-up care is a key part of your treatment and safety.  Be sure to make and go to all appointments, and call your doctor if you are having problems. It's also a good idea to know your test results and keep a list of the medicines you take. How can you care for yourself at home? Medical treatment  · If you stop taking your medicine, your blood pressure will go back up. You may take one or more types of medicine to lower your blood pressure. Be safe with medicines. Take your medicine exactly as prescribed. Call your doctor if you think you are having a problem with your medicine. · Talk to your doctor before you start taking aspirin every day. Aspirin can help certain people lower their risk of a heart attack or stroke. But taking aspirin isn't right for everyone, because it can cause serious bleeding. · See your doctor regularly. You may need to see the doctor more often at first or until your blood pressure comes down. · If you are taking blood pressure medicine, talk to your doctor before you take decongestants or anti-inflammatory medicine, such as ibuprofen. Some of these medicines can raise blood pressure. · Learn how to check your blood pressure at home. Lifestyle changes  · Stay at a healthy weight. This is especially important if you put on weight around the waist. Losing even 10 pounds can help you lower your blood pressure. · If your doctor recommends it, get more exercise. Walking is a good choice. Bit by bit, increase the amount you walk every day. Try for at least 30 minutes on most days of the week. You also may want to swim, bike, or do other activities. · Avoid or limit alcohol. Talk to your doctor about whether you can drink any alcohol. · Try to limit how much sodium you eat to less than 2,300 milligrams (mg) a day. Your doctor may ask you to try to eat less than 1,500 mg a day. · Eat plenty of fruits (such as bananas and oranges), vegetables, legumes, whole grains, and low-fat dairy products. · Lower the amount of saturated fat in your diet.  Saturated fat is found in animal products such as milk, cheese, and meat. Limiting these foods may help you lose weight and also lower your risk for heart disease. · Do not smoke. Smoking increases your risk for heart attack and stroke. If you need help quitting, talk to your doctor about stop-smoking programs and medicines. These can increase your chances of quitting for good. When should you call for help? Call 911 anytime you think you may need emergency care. This may mean having symptoms that suggest that your blood pressure is causing a serious heart or blood vessel problem. Your blood pressure may be over 180/110. For example, call 911 if:  · You have symptoms of a heart attack. These may include:  ¨ Chest pain or pressure, or a strange feeling in the chest.  ¨ Sweating. ¨ Shortness of breath. ¨ Nausea or vomiting. ¨ Pain, pressure, or a strange feeling in the back, neck, jaw, or upper belly or in one or both shoulders or arms. ¨ Lightheadedness or sudden weakness. ¨ A fast or irregular heartbeat. · You have symptoms of a stroke. These may include:  ¨ Sudden numbness, tingling, weakness, or loss of movement in your face, arm, or leg, especially on only one side of your body. ¨ Sudden vision changes. ¨ Sudden trouble speaking. ¨ Sudden confusion or trouble understanding simple statements. ¨ Sudden problems with walking or balance. ¨ A sudden, severe headache that is different from past headaches. · You have severe back or belly pain. Do not wait until your blood pressure comes down on its own. Get help right away. Call your doctor now or seek immediate care if:  · Your blood pressure is much higher than normal (such as 180/110 or higher), but you don't have symptoms. · You think high blood pressure is causing symptoms, such as:  ¨ Severe headache. ¨ Blurry vision. Watch closely for changes in your health, and be sure to contact your doctor if:  · Your blood pressure measures 140/90 or higher at least 2 times.  That means the top number is 140 or higher or the bottom number is 90 or higher, or both. · You think you may be having side effects from your blood pressure medicine. · Your blood pressure is usually normal, but it goes above normal at least 2 times. Where can you learn more? Go to http://angie-nile.info/. Enter N582 in the search box to learn more about \"High Blood Pressure: Care Instructions. \"  Current as of: August 8, 2016  Content Version: 11.1  © 2740-0820 [employer]. Care instructions adapted under license by [employer] (which disclaims liability or warranty for this information). If you have questions about a medical condition or this instruction, always ask your healthcare professional. [address] any warranty or liability for your use of this information.

## 2017-03-21 NOTE — DISCHARGE INSTRUCTIONS
[facility]      Post Procedures Instructions    *Resume Diet and Activity as tolerated. Rest for the remainder of the day. *You may fell worse before you feel better as the numbing medications wear off before the steroids take effect if used for your procedures. *Do not use affected extremity until numbness or loss of sensation has completely resolved without assistance. *DO NOT DRIVE, operate machinery/heavey equipment for 24 hours. *DO NOT DRINK ALCOHOL for 24 hours as it may interact with the sedation if you received it and also thins your blood and may cause you to bleed. *WAIT 24 hours before starting back ANY Blood thinning medications:   (Heparin, Coumadin, Warfarin, Lovenox, Plavix, Aggrenox)    *Resume Pre-Procedure Medications as prescribed except Blood Thinners unless directed by your Physician or Cardiologist.     *Avoid Hot tubs and Heating pad for 24 hours to prevent dissipation of medications, you may shower to remove bandages and remaining prep residue on the skin. * If you develop a Headache, drink plenty of fluids including beverages with caffeine (Coffee, Mt. Dew etc.) and rest.  If the headache persists longer than 24 hoursor intensifies - Please call [facility] ([facility]) [phone]    * If you are DIABETIC, check your blood sugar three times a day for the next three days, the steroids will increase your blood sugar. If your blood sugar is greater than 400 have someone drive you to the nearest [address]. * If you experience any of the following problems, call the [facility] [phone] between 8:00 am - 4:30pm or After Hours [phone].     Shortness of breath    Fever of 101 F or higher    Nausea / Vomiting (not normal to you)    Increasing stiffness in the neck    Weakness or numbness in the arms or legs that is not resolving    Prolonged and increasing pain > than 4 days    ANYTHING OUT of the ORDINARY TO YOU    If YOU are experiencing a severe reaction / complication that you have never had before post procedure, call 911 or go to the nearest emergency room! All patients must have a  for transportation [place] regardless if you do or do not receive sedation. DISCHARGE SUMMARY from Nurse      PATIENT INSTRUCTIONS:    After Oral  or intravenous sedation, for 24 hours or while taking prescription Narcotics:  · Limit your activities  · Do not drive and operate hazardous machinery  · Do not make important personal or business decisions  · Do  not drink alcoholic beverages  · If you have not urinated within 8 hours after discharge, please contact your surgeon on call. Report the following to your surgeon:  · Excessive pain, swelling, redness or odor of or around the surgical area  · Temperature over 101  · Nausea and vomiting lasting longer than 4 hours or if unable to take medications  · Any signs of decreased circulation or nerve impairment to extremity: change in color, persistent  numbness, tingling, coldness or increase pain  · Any questions        What to do at Home:  Recommended activity: Activity as tolerated, NO DRIVING FOR 24 Hours post injection          *  Please give a list of your current medications to your Primary Care Provider. *  Please update this list whenever your medications are discontinued, doses are      changed, or new medications (including over-the-counter products) are added. *  Please carry medication information at all times in case of emergency situations. These are general instructions for a healthy lifestyle:    No smoking/ No tobacco products/ Avoid exposure to second hand smoke    Surgeon General's Warning:  Quitting smoking now greatly reduces serious risk to your health.     Obesity, smoking, and sedentary lifestyle greatly increases your risk for illness    A healthy diet, regular physical exercise & weight monitoring are important for maintaining a healthy lifestyle    You may be retaining fluid if you have a history of heart failure or if you experience any of the following symptoms:  Weight gain of 3 pounds or more overnight or 5 pounds in a week, increased swelling in our hands or feet or shortness of breath while lying flat in bed. Please call your doctor as soon as you notice any of these symptoms; do not wait until your next office visit. Recognize signs and symptoms of STROKE:    F-face looks uneven    A-arms unable to move or move unevenly    S-speech slurred or non-existent    T-time-call 911 as soon as signs and symptoms begin-DO NOT go       Back to bed or wait to see if you get better-TIME IS BRAIN.

## 2017-03-21 NOTE — ED TRIAGE NOTES
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

## 2017-03-21 NOTE — H&P (VIEW-ONLY)
Nursing Notes    Patient presents to the office today in follow-up. Patient rates her pain at 8/10 on the numerical pain scale. Reviewed medications with counts as follows:    Rx Date filled Qty Dispensed Pill Count Last Dose Short   Fentanyl 100 mcg/hr 02/15/17 15 5 Current patch on right chest wall no   Fentanyl 25 mcg/hr 02/15/17 15 5 Current patch on right chest wall no   Percocet 10/325 mg  02/15/17 120 37 today no                  POC UDS was not performed in office today    Any new labs or imaging since last appointment? NO    Have you been to an emergency room (ER) or urgent care clinic since your last visit? NO            Have you been hospitalized since your last visit? NO     If yes, where, when, and reason for visit? Have you seen or consulted any other health care providers outside of the [facility]  since your last visit? YES     If yes, where, when, and reason for visit?pcp    HM deferred to pcp.

## 2017-03-22 LAB
ATRIAL RATE: 63 BPM
CALCULATED P AXIS, ECG09: 67 DEGREES
CALCULATED R AXIS, ECG10: 55 DEGREES
CALCULATED T AXIS, ECG11: 60 DEGREES
DIAGNOSIS, 93000: NORMAL
P-R INTERVAL, ECG05: 176 MS
Q-T INTERVAL, ECG07: 438 MS
QRS DURATION, ECG06: 92 MS
QTC CALCULATION (BEZET), ECG08: 448 MS
VENTRICULAR RATE, ECG03: 63 BPM

## 2017-06-02 ENCOUNTER — DOCUMENTATION ONLY (OUTPATIENT)
Dept: PAIN MANAGEMENT | Age: 57
End: 2017-06-02

## 2017-06-02 ENCOUNTER — OFFICE VISIT (OUTPATIENT)
Dept: PAIN MANAGEMENT | Age: 57
End: 2017-06-02

## 2017-06-02 VITALS
HEIGHT: 71 IN | BODY MASS INDEX: 31.92 KG/M2 | SYSTOLIC BLOOD PRESSURE: 134 MMHG | WEIGHT: 228 LBS | HEART RATE: 66 BPM | DIASTOLIC BLOOD PRESSURE: 74 MMHG

## 2017-06-02 DIAGNOSIS — M47.22 OSTEOARTHRITIS OF SPINE WITH RADICULOPATHY, CERVICAL REGION: Primary | ICD-10-CM

## 2017-06-02 DIAGNOSIS — M54.2 CERVICALGIA: ICD-10-CM

## 2017-06-02 DIAGNOSIS — M54.12 BRACHIAL NEURITIS: ICD-10-CM

## 2017-06-02 DIAGNOSIS — Z79.899 ENCOUNTER FOR LONG-TERM (CURRENT) USE OF HIGH-RISK MEDICATION: ICD-10-CM

## 2017-06-02 DIAGNOSIS — M54.81 BILATERAL OCCIPITAL NEURALGIA: ICD-10-CM

## 2017-06-02 DIAGNOSIS — G24.3 SPASMODIC TORTICOLLIS: ICD-10-CM

## 2017-06-02 DIAGNOSIS — M50.30 DEGENERATION OF CERVICAL INTERVERTEBRAL DISC: ICD-10-CM

## 2017-06-02 DIAGNOSIS — M54.12 CERVICAL RADICULOPATHY: ICD-10-CM

## 2017-06-02 DIAGNOSIS — M48.02 SPINAL STENOSIS IN CERVICAL REGION: ICD-10-CM

## 2017-06-02 LAB
ALCOHOL UR POC: NORMAL
AMPHETAMINES UR POC: NORMAL
BARBITURATES UR POC: NORMAL
BENZODIAZEPINES UR POC: NORMAL
BUPRENORPHINE UR POC: NORMAL
CANNABINOIDS UR POC: NORMAL
CARISOPRODOL UR POC: NORMAL
COCAINE UR POC: NORMAL
FENTANYL UR POC: NORMAL
MDMA/ECSTASY UR POC: NORMAL
METHADONE UR POC: NORMAL
METHAMPHETAMINE UR POC: NORMAL
METHYLPHENIDATE UR POC: NORMAL
OPIATES UR POC: NORMAL
OXYCODONE UR POC: NORMAL
PHENCYCLIDINE UR POC: NORMAL
PROPOXYPHENE UR POC: NORMAL
TRAMADOL UR POC: NORMAL
TRICYCLICS UR POC: NORMAL

## 2017-06-02 RX ORDER — NALOXONE HYDROCHLORIDE 4 MG/.1ML
4 SPRAY NASAL
Qty: 1 PACKAGE | Refills: 0 | Status: SHIPPED | OUTPATIENT
Start: 2017-06-02 | End: 2019-04-02 | Stop reason: SDUPTHER

## 2017-06-02 RX ORDER — FENTANYL 25 UG/1
1 PATCH TRANSDERMAL
Qty: 15 PATCH | Refills: 0 | Status: SHIPPED | OUTPATIENT
Start: 2017-06-09 | End: 2017-06-02 | Stop reason: SDUPTHER

## 2017-06-02 RX ORDER — FENTANYL 100 UG/H
1 PATCH TRANSDERMAL
Qty: 15 PATCH | Refills: 0 | Status: SHIPPED | OUTPATIENT
Start: 2017-08-06 | End: 2018-05-21

## 2017-06-02 RX ORDER — FENTANYL 100 UG/H
1 PATCH TRANSDERMAL
Qty: 15 PATCH | Refills: 0 | Status: SHIPPED | OUTPATIENT
Start: 2017-06-09 | End: 2017-06-02 | Stop reason: SDUPTHER

## 2017-06-02 RX ORDER — FENTANYL 100 UG/H
1 PATCH TRANSDERMAL
Qty: 15 PATCH | Refills: 0 | Status: SHIPPED | OUTPATIENT
Start: 2017-07-08 | End: 2017-06-02 | Stop reason: SDUPTHER

## 2017-06-02 RX ORDER — OXYCODONE AND ACETAMINOPHEN 10; 325 MG/1; MG/1
1 TABLET ORAL
Qty: 120 TAB | Refills: 0 | Status: SHIPPED | OUTPATIENT
Start: 2017-06-09 | End: 2017-06-02 | Stop reason: SDUPTHER

## 2017-06-02 RX ORDER — FENTANYL 25 UG/1
1 PATCH TRANSDERMAL
Qty: 15 PATCH | Refills: 0 | Status: SHIPPED | OUTPATIENT
Start: 2017-08-06 | End: 2017-08-29 | Stop reason: SDUPTHER

## 2017-06-02 RX ORDER — FENTANYL 25 UG/1
1 PATCH TRANSDERMAL
Qty: 15 PATCH | Refills: 0 | Status: SHIPPED | OUTPATIENT
Start: 2017-07-08 | End: 2017-06-02 | Stop reason: SDUPTHER

## 2017-06-02 RX ORDER — OXYCODONE AND ACETAMINOPHEN 10; 325 MG/1; MG/1
1 TABLET ORAL
Qty: 120 TAB | Refills: 0 | Status: SHIPPED | OUTPATIENT
Start: 2017-08-06 | End: 2017-08-29 | Stop reason: SDUPTHER

## 2017-06-02 RX ORDER — OXYCODONE AND ACETAMINOPHEN 10; 325 MG/1; MG/1
1 TABLET ORAL
Qty: 120 TAB | Refills: 0 | Status: SHIPPED | OUTPATIENT
Start: 2017-07-08 | End: 2017-06-02 | Stop reason: SDUPTHER

## 2017-06-02 NOTE — MR AVS SNAPSHOT
Visit Information Date & Time Provider Department Dept. Phone Encounter #  
 6/2/2017  1:20 PM [facility] [phone] Upcoming Health Maintenance Date Due Hepatitis C Screening 1960 DTaP/Tdap/Td series (1 - Tdap) 6/29/1980 PAP AKA CERVICAL CYTOLOGY 6/29/1980 BREAST CANCER SCRN MAMMOGRAM 6/29/2009 FOBT Q 1 YEAR AGE 50-75 6/29/2009 INFLUENZA AGE 9 TO ADULT 8/1/2017 Allergies as of 6/2/2017  Review Complete On: 6/2/2017 By: Jamey Haque Severity Noted Reaction Type Reactions Oxycontin [Oxycodone]   Intolerance Not Reported This Time Pt states not allergic to oxycontin Current Immunizations  Never Reviewed No immunizations on file. Not reviewed this visit You Were Diagnosed With   
  
 Codes Comments Osteoarthritis of spine with radiculopathy, cervical region    -  Primary ICD-10-CM: M47.22 
ICD-9-CM: 721.0 Spasmodic torticollis     ICD-10-CM: G24.3 ICD-9-CM: 333.83 Brachial neuritis     ICD-10-CM: M54.12 
ICD-9-CM: 723.4 Cervical radiculopathy     ICD-10-CM: M54.12 
ICD-9-CM: 723.4 Cervicalgia     ICD-10-CM: M54.2 ICD-9-CM: 723.1 Degeneration of cervical intervertebral disc     ICD-10-CM: M50.30 ICD-9-CM: 722.4 Spinal stenosis in cervical region     ICD-10-CM: M48.02 
ICD-9-CM: 723.0 Bilateral occipital neuralgia     ICD-10-CM: M54.81 ICD-9-CM: 723.8 Vitals BP Pulse Height(growth percentile) Weight(growth percentile) BMI OB Status 134/74 66 5' 11\" (1.803 m) 228 lb (103.4 kg) 31.8 kg/m2 Hysterectomy Smoking Status Never Smoker BMI and BSA Data Body Mass Index Body Surface Area  
 31.8 kg/m 2 2.28 m 2 Preferred Pharmacy Pharmacy Name Phone [facility] - [facility] "[address] [phone] Your Updated Medication List  
  
   
 This list is accurate as of: 6/2/17  2:01 PM.  Always use your most recent med list.  
  
  
  
  
 CRESTOR 10 mg tablet Generic drug:  rosuvastatin Take 10 mg by mouth nightly.  
  
 ergocalciferol 50,000 unit capsule Commonly known as:  ERGOCALCIFEROL Take 1 Cap by mouth as directed. Once  a week * fentaNYL 100 mcg/hr PATCH Commonly known as:  DURAGESIC  
1 Patch by TransDERmal route every fourty-eight (48) hours. Max Daily Amount: 1 Patch. for chronic, severe, refractory pain. Junior Pinzonman brands only * fentaNYL 100 mcg/hr PATCH Commonly known as:  DURAGESIC  
1 Patch by TransDERmal route every fourty-eight (48) hours for 30 days. Max Daily Amount: 1 Patch. for chronic, severe, refractory pain. [facility]le brands only  Indications: CHRONIC PAIN WITH OPIOID TOLERANCE, SEVERE PAIN WITH OPIOID TOLERANCE  
  
 * fentaNYL 25 mcg/hr PATCH Commonly known as:  DURAGESIC  
1 Patch by TransDERmal route every fourty-eight (48) hours for 30 days. Max Daily Amount: 1 Patch. for chronic, severe, refractory pain. Valle brands only  Indications: CHRONIC PAIN WITH OPIOID TOLERANCE, SEVERE PAIN WITH OPIOID TOLERANCE  
  
 * fentaNYL 25 mcg/hr PATCH Commonly known as:  DURAGESIC  
1 Patch by TransDERmal route every fourty-eight (48) hours for 30 days. Max Daily Amount: 1 Patch. for chronic, severe, refractory pain. Valle brands only  Indications: CHRONIC PAIN WITH OPIOID TOLERANCE, SEVERE PAIN WITH OPIOID TOLERANCE  
  
 * fentaNYL 100 mcg/hr PATCH Commonly known as:  DURAGESIC  
1 Patch by TransDERmal route every fourty-eight (48) hours for 30 days. Max Daily Amount: 1 Patch. for chronic, severe, refractory pain. Valle brands only  Indications: CHRONIC PAIN WITH OPIOID TOLERANCE, SEVERE PAIN WITH OPIOID TOLERANCE  
  
 * fentaNYL 100 mcg/hr PATCH Commonly known as:  DURAGESIC  
1 Patch by TransDERmal route every fourty-eight (48) hours for 30 days. Max Daily Amount: 1 Patch. for chronic, severe, refractory pain. Valle brands only  Indications: Chronic Pain with Opioid Tolerance, SEVERE PAIN WITH OPIOID TOLERANCE Start taking on:  8/6/2017 * fentaNYL 25 mcg/hr PATCH Commonly known as:  DURAGESIC  
1 Patch by TransDERmal route every fourty-eight (48) hours for 30 days. Max Daily Amount: 1 Patch. for chronic, severe, refractory pain. Valle brands only  Indications: Chronic Pain with Opioid Tolerance, SEVERE PAIN WITH OPIOID TOLERANCE Start taking on:  8/6/2017  
  
 naloxegol 25 mg Tab tablet Commonly known as:  Bright Booze Take 1 Tab by mouth daily. Take daily before breakfast for opioid induced constipation  Indications: OPIOID-INDUCED CONSTIPATION  
  
 naloxone 4 mg/actuation Spry 4 mg by Nasal route once as needed (opioid overdose) for up to 1 dose. May substitute 2 mg syringe if insurance requires  
  
 nebivolol 5 mg tablet Commonly known as:  BYSTOLIC Take 1 Tab by mouth nightly. For migraine prevention and hypertension NORVASC 10 mg tablet Generic drug:  amLODIPine Take  by mouth daily. pantoprazole 40 mg tablet Commonly known as:  PROTONIX Take 2 tabs daily. May take an occasional third tablet as needed. * oxyCODONE-acetaminophen  mg per tablet Commonly known as:  PERCOCET Take 1 Tab by mouth four (4) times daily as needed for Pain for up to 30 days. Max Daily Amount: 4 Tabs. Indications: PAIN  
  
 * PERCOCET  mg per tablet Generic drug:  oxyCODONE-acetaminophen Take 1 Tab by mouth four (4) times daily as needed for Pain for up to 30 days. Max Daily Amount: 4 Tabs. Indications: PAIN  
  
 * oxyCODONE-acetaminophen  mg per tablet Commonly known as:  PERCOCET Take 1 Tab by mouth four (4) times daily as needed for Pain for up to 30 days. Max Daily Amount: 4 Tabs. Indications: Pain Start taking on:  8/6/2017 SYNTHROID 175 mcg tablet Generic drug:  levothyroxine Take 200 mcg by mouth Daily (before breakfast). TENS Units Frank Chars Commonly known as:  TENS 502  
1 Units by Does Not Apply route as directed. Transparent Dressings 3 1/2 X 4 \" Bndg Commonly known as:  TEGADERM  
2 Patches by Apply Externally route every fourty-eight (48) hours. Dispense two boxes XANAX 0.5 mg tablet Generic drug:  ALPRAZolam  
Take 1 mg by mouth three (3) times daily as needed. Take 1- 1/2 tabs tid prn * Notice: This list has 10 medication(s) that are the same as other medications prescribed for you. Read the directions carefully, and ask your doctor or other care provider to review them with you. Prescriptions Printed Refills  
 fentaNYL (DURAGESIC) 100 mcg/hr PATCH 0 Starting on: 2017 Si Patch by TransDERmal route every fourty-eight (48) hours for 30 days. Max Daily Amount: 1 Patch. for chronic, severe, refractory pain. Valle brands only  Indications: Chronic Pain with Opioid Tolerance, SEVERE PAIN WITH OPIOID TOLERANCE Class: Print Route: TransDERmal  
 fentaNYL (DURAGESIC) 25 mcg/hr PATCH 0 Starting on: 2017 Si Patch by TransDERmal route every fourty-eight (48) hours for 30 days. Max Daily Amount: 1 Patch. for chronic, severe, refractory pain. Valle brands only  Indications: Chronic Pain with Opioid Tolerance, SEVERE PAIN WITH OPIOID TOLERANCE Class: Print Route: TransDERmal  
 oxyCODONE-acetaminophen (PERCOCET)  mg per tablet 0 Starting on: 2017 Sig: Take 1 Tab by mouth four (4) times daily as needed for Pain for up to 30 days. Max Daily Amount: 4 Tabs. Indications: Pain Class: Print Route: Oral  
  
Prescriptions Sent to Pharmacy Refills  
 naloxone 4 mg/actuation spry 0 Si mg by Nasal route once as needed (opioid overdose) for up to 1 dose. May substitute 2 mg syringe if insurance requires  Class: Normal  
 [facility] [address] Ph #: [phone] Route: Nasal  
 naloxegol (MOVANTIK) 25 mg tab tablet 11 Sig: Take 1 Tab by mouth daily. Take daily before breakfast for opioid induced constipation  Indications: OPIOID-INDUCED CONSTIPATION Class: Normal  
 Pharmacy: [facility] [id number]., [address] Ph #: [phone] Route: Oral  
  
 Please provide this summary of care documentation to your next provider. Your primary care clinician is listed as Klaudia Rodriguez. If you have any questions after today's visit, please call [phone].

## 2017-06-02 NOTE — PROGRESS NOTES
The pt's prescriptions were mailed over to [facility] via [employer] .

## 2017-06-02 NOTE — PROGRESS NOTES
HISTORY OF PRESENT ILLNESS  Reeta Rinne is a 61 y.o. female. Patient presents for follow up of chronic neck pain due to cervical spondylosis and stenosis secondary to an industrial accident sustained several years ago. She complains of worsening leg cramps over the past several weeks. These typically occur at night while she is trying to sleep. We discussed treatment options at length--see treatment plan below. The remainder of her pain generators remain constant but stable since her last visit. She continues to complain of persistent and often profound neck pain radiating into the left shoulder and arm. She describes her pain as aching, stabbing, and shooting, with intermittent paraesthesias radiating into the left arm and hand/fingers. She also continues to complain of persistent spasms in the paraspinous muscles of the cervical spine as well as frequent migraines. Recent cervical MARILOU was of little benefit. Recent MRI of the cervical spine, which revealed mild to moderate cervical spondylosis with disc bulges, most pronounced at right C3-4, and bilateral C4-C6 levels. Recent interventions such as cervical ESIs are very effective for neck pain. Pt reports average of 4/10 pain scale most days. Medications continue to work well for pain control overall. Reeta Rinne is tolerating medications well, with no untoward side effects noted. She is able to stay more active with less discomfort with these current doses. The patient reports an average of 60% relief with this regimen. Most recent UDS and  were consistent with prescribed medications. Pill counts are appropriate. She is informed of side effects, risks, and benefits of this regimen, and emphasizes that she derives a significant improvement in functionality and quality of life, and notes that non-opioid medications and therapies in the past have not offered significant benefit. She denies new or worsening insomnia or constipation issues.  She denies any falls, injuries, or hospitalizations since the last visit. HPI--see above    ROS  HENT: Positive for neck pain (s/p cervical MARILOU, >50% relief ). Respiratory: Negative. Cardiovascular: Negative. Gastrointestinal: Negative for nausea, vomiting and constipation (controlled with Amitiza ). Genitourinary: Negative. Musculoskeletal: Positive for back pain (thoracic ) and joint pain. Negative for myalgias and falls. Muscle cramps (in lower legs)    Psychiatric/Behavioral: Positive for depression and anxiety, negative for suicidal ideas, hallucinations and substance abuse. The patient has insomnia   Physical Exam  Head: Normocephalic and atraumatic. Eyes: Pupils are equal, round, and reactive to light. Right eye exhibits no discharge. Left eye exhibits no discharge. Glasses   Pulmonary/Chest: Effort normal. No respiratory distress. Musculoskeletal: She exhibits tenderness. She exhibits no edema. Cervical back: She exhibits decreased range of motion and tenderness. Back:  palpable areas of muscle nodularity and tightness along the upper ridge of the trapezius muscle on the left  Neurological: She is alert and oriented to person, place, and time. No cranial nerve deficit (grossly intact). Skin: Skin is warm and dry. She is not diaphoretic. No erythema. Psychiatric: She has a normal mood and affect. Her behavior is normal. Judgment and thought content normal.   ASSESSMENT and PLAN    ICD-10-CM ICD-9-CM    1. Osteoarthritis of spine with radiculopathy, cervical region M47.22 721.0    2. Spasmodic torticollis G24.3 333.83    3. Brachial neuritis M54.12 723.4    4. Cervical radiculopathy M54.12 723.4    5. Cervicalgia M54.2 723.1    6. Degeneration of cervical intervertebral disc M50.30 722.4    7. Spinal stenosis in cervical region M48.02 723.0    8. Bilateral occipital neuralgia M54.81 723.8    9.  Encounter for long-term (current) use of high-risk medication Z79.899 V58.69 DRUG SCREEN AMB POC DRUG SCREEN ()      Plan:  Continue same medications as prescribed for chronic pain  Methylated B vitamins have been shown to be beneficial    Follow up in 3 months or sooner if needed  Regular exercise and attention to emotional health and diet remain the most effective ways to treat chronic pain of all kinds  You may contact me with questions or concerns through [employer]

## 2017-06-02 NOTE — PROGRESS NOTES
Nursing Notes    Patient presents to the office today in follow-up. Patient rates her pain at 4/10 on the numerical pain scale. Reviewed medications with counts as follows:    Rx Date filled Qty Dispensed Pill Count Last Dose Short   Oxycodone 10-325mg 05/11/17 120 37 today no   Fentanyl 25mcg 05/11/17 15 4+1 rt chest today no   Fentanyl 100mcg 05/11/17 15 4+1 rt chest today no                          Comments:     POC UDS was performed in office today    Any new labs or imaging since last appointment? YES    Have you been to an emergency room (ER) or urgent care clinic since your last visit? YES,  Pt states last time she was to receive her inject B/P  High and sent to ER            Have you been hospitalized since your last visit? NO     If yes, where, when, and reason for visit? Have you seen or consulted any other health care providers outside of the [address] [place] [address]  since your last visit? YES, PCP     If yes, where, when, and reason for visit? HM deferred to pcp.

## 2017-06-08 RX ORDER — LIDOCAINE 50 MG/G
1 PATCH TOPICAL EVERY 24 HOURS
Qty: 1 EACH | Refills: 0
Start: 2017-06-08 | End: 2019-04-02 | Stop reason: SDUPTHER

## 2017-07-14 RX ORDER — MIDAZOLAM HYDROCHLORIDE 1 MG/ML
.5-6 INJECTION, SOLUTION INTRAMUSCULAR; INTRAVENOUS
Status: CANCELLED | OUTPATIENT
Start: 2017-07-18

## 2017-07-14 RX ORDER — SODIUM CHLORIDE 0.9 % (FLUSH) 0.9 %
5-10 SYRINGE (ML) INJECTION AS NEEDED
Status: CANCELLED | OUTPATIENT
Start: 2017-07-14

## 2017-07-18 ENCOUNTER — APPOINTMENT (OUTPATIENT)
Dept: GENERAL RADIOLOGY | Age: 57
End: 2017-07-18
Attending: PHYSICAL MEDICINE & REHABILITATION
Payer: OTHER MISCELLANEOUS

## 2017-07-18 ENCOUNTER — HOSPITAL ENCOUNTER (OUTPATIENT)
Age: 57
Setting detail: OUTPATIENT SURGERY
Discharge: HOME | End: 2017-07-18
Attending: PHYSICAL MEDICINE & REHABILITATION | Admitting: PHYSICAL MEDICINE & REHABILITATION
Payer: OTHER MISCELLANEOUS

## 2017-07-18 VITALS
HEART RATE: 48 BPM | HEIGHT: 71 IN | WEIGHT: 231 LBS | OXYGEN SATURATION: 98 % | BODY MASS INDEX: 32.34 KG/M2 | TEMPERATURE: 98.7 F | SYSTOLIC BLOOD PRESSURE: 151 MMHG | RESPIRATION RATE: 14 BRPM | DIASTOLIC BLOOD PRESSURE: 95 MMHG

## 2017-07-18 PROCEDURE — 99152 MOD SED SAME PHYS/QHP 5/>YRS: CPT | Performed by: PHYSICAL MEDICINE & REHABILITATION

## 2017-07-18 PROCEDURE — 74011250636 HC RX REV CODE- 250/636

## 2017-07-18 PROCEDURE — 76010000009 HC PAIN MGT 0 TO 30 MIN PROC: Performed by: PHYSICAL MEDICINE & REHABILITATION

## 2017-07-18 PROCEDURE — 74011000250 HC RX REV CODE- 250

## 2017-07-18 PROCEDURE — 74011636320 HC RX REV CODE- 636/320

## 2017-07-18 RX ORDER — FENTANYL CITRATE 50 UG/ML
INJECTION, SOLUTION INTRAMUSCULAR; INTRAVENOUS AS NEEDED
Status: DISCONTINUED | OUTPATIENT
Start: 2017-07-18 | End: 2017-07-18 | Stop reason: HOSPADM

## 2017-07-18 RX ORDER — SODIUM CHLORIDE 0.9 % (FLUSH) 0.9 %
5-10 SYRINGE (ML) INJECTION AS NEEDED
Status: DISCONTINUED | OUTPATIENT
Start: 2017-07-18 | End: 2017-07-18 | Stop reason: HOSPADM

## 2017-07-18 RX ORDER — MIDAZOLAM HYDROCHLORIDE 1 MG/ML
.5-6 INJECTION, SOLUTION INTRAMUSCULAR; INTRAVENOUS
Status: DISCONTINUED | OUTPATIENT
Start: 2017-07-18 | End: 2017-07-18 | Stop reason: HOSPADM

## 2017-07-18 RX ORDER — DEXAMETHASONE SODIUM PHOSPHATE 100 MG/10ML
INJECTION INTRAMUSCULAR; INTRAVENOUS AS NEEDED
Status: DISCONTINUED | OUTPATIENT
Start: 2017-07-18 | End: 2017-07-18 | Stop reason: HOSPADM

## 2017-07-18 RX ORDER — LIDOCAINE HYDROCHLORIDE 10 MG/ML
INJECTION, SOLUTION EPIDURAL; INFILTRATION; INTRACAUDAL; PERINEURAL AS NEEDED
Status: DISCONTINUED | OUTPATIENT
Start: 2017-07-18 | End: 2017-07-18 | Stop reason: HOSPADM

## 2017-07-18 NOTE — DISCHARGE INSTRUCTIONS
[facility]      Post Procedures Instructions    *Resume Diet and Activity as tolerated. Rest for the remainder of the day. *You may fell worse before you feel better as the numbing medications wear off before the steroids take effect if used for your procedures. *Do not use affected extremity until numbness or loss of sensation has completely resolved without assistance. *DO NOT DRIVE, operate machinery/heavey equipment for 24 hours. *DO NOT DRINK ALCOHOL for 24 hours as it may interact with the sedation if you received it and also thins your blood and may cause you to bleed. *WAIT 24 hours before starting back ANY Blood thinning medications:   (Heparin, Coumadin, Warfarin, Lovenox, Plavix, Aggrenox)    *Resume Pre-Procedure Medications as prescribed except Blood Thinners unless directed by your Physician or Cardiologist.     *Avoid Hot tubs and Heating pad for 24 hours to prevent dissipation of medications, you may shower to remove bandages and remaining prep residue on the skin. * If you develop a Headache, drink plenty of fluids including beverages with caffeine (Coffee, Mt. Dew etc.) and rest.  If the headache persists longer than 24 hoursor intensifies - Please call [facility] ([facility]) [phone]    * If you are DIABETIC, check your blood sugar three times a day for the next three days, the steroids will increase your blood sugar. If your blood sugar is greater than 400 have someone drive you to the nearest [address]. * If you experience any of the following problems, call the [facility] [phone] between 8:00 am - 4:30pm or After Hours [phone].     Shortness of breath    Fever of 101 F or higher    Nausea / Vomiting (not normal to you)    Increasing stiffness in the neck    Weakness or numbness in the arms or legs that is not resolving    Prolonged and increasing pain > than 4 days    ANYTHING OUT of the ORDINARY TO YOU    If YOU are experiencing a severe reaction / complication that you have never had before post procedure, call 911 or go to the nearest emergency room! All patients must have a  for transportation [place] regardless if you do or do not receive sedation. DISCHARGE SUMMARY from Nurse      PATIENT INSTRUCTIONS:    After Oral  or intravenous sedation, for 24 hours or while taking prescription Narcotics:  · Limit your activities  · Do not drive and operate hazardous machinery  · Do not make important personal or business decisions  · Do  not drink alcoholic beverages  · If you have not urinated within 8 hours after discharge, please contact your surgeon on call. Report the following to your surgeon:  · Excessive pain, swelling, redness or odor of or around the surgical area  · Temperature over 101  · Nausea and vomiting lasting longer than 4 hours or if unable to take medications  · Any signs of decreased circulation or nerve impairment to extremity: change in color, persistent  numbness, tingling, coldness or increase pain  · Any questions        What to do at Home:  Recommended activity: Activity as tolerated, NO DRIVING FOR 24 Hours post injection          *  Please give a list of your current medications to your Primary Care Provider. *  Please update this list whenever your medications are discontinued, doses are      changed, or new medications (including over-the-counter products) are added. *  Please carry medication information at all times in case of emergency situations. These are general instructions for a healthy lifestyle:    No smoking/ No tobacco products/ Avoid exposure to second hand smoke    Surgeon General's Warning:  Quitting smoking now greatly reduces serious risk to your health.     Obesity, smoking, and sedentary lifestyle greatly increases your risk for illness    A healthy diet, regular physical exercise & weight monitoring are important for maintaining a healthy lifestyle    You may be retaining fluid if you have a history of heart failure or if you experience any of the following symptoms:  Weight gain of 3 pounds or more overnight or 5 pounds in a week, increased swelling in our hands or feet or shortness of breath while lying flat in bed. Please call your doctor as soon as you notice any of these symptoms; do not wait until your next office visit. Recognize signs and symptoms of STROKE:    F-face looks uneven    A-arms unable to move or move unevenly    S-speech slurred or non-existent    T-time-call 911 as soon as signs and symptoms begin-DO NOT go       Back to bed or wait to see if you get better-TIME IS BRAIN.

## 2017-07-18 NOTE — PROCEDURES
[facility]    INTERLAMINAR CERVICAL EPIDURAL STEROID INJECTION  PROCEDURE REPORT      PATIENT:  Muna Wilson  YOB: 1960  DATE OF SERVICE:  7/18/2017  [facility]    PRE-PROCEDURE DIAGNOSIS:  See Above    POST-PROCEDURE DIAGNOSIS:  See Above                PROCEDURE:    1. Interlaminar cervical epidural steroid injection, C7-T1  (49879)  2. Fluoroscopic needle guidance (spinal) (31274)  3. Supervision of moderate sedation (41974)    ANESTHESIA:  Local with moderate IV sedation. See Medication Administration Record for specific medications and dosage. COMPLICATIONS: None. PHYSICIAN:  Ridge Moreland MD    PRE-PROCEDURE NOTE:  Pre-procedural assessment of the patient was performed including a limited history and physical examination. The details of the procedure were discussed with the patient, including the risks, benefits and alternative options and an informed consent was obtained. The patients NPO status, if necessary for the specific procedure and/or administration of moderate intravenous sedation, if utilized, and availability of a responsible adult to escort the patient following the procedure were confirmed. A peripheral intravenous cannula was placed without difficulty and lactated Ringers solution administered. See nursing notes for details. PROCEDURE NOTE:  The patient was brought to the procedure suite and positioned on the fluoroscopy table in the prone position. Physiologic monitors were applied and supplemental oxygen was administered via nasal cannula. The skin was prepped in the standard surgical fashion and sterile drapes were applied over the procedure site.  Please refer to the Flowsheet for documentation of the patients vital signs and the Medication Administration Record for any oral and/or intravenous sedation administered prior to or during the procedure. The above-listed interlaminar space was identified and the skin and subcutaneous tissues were infiltrated with 1% Lidocaine. Under anterior-posterior fluoroscopic guidance an 18g, 3.5-inch Tuohy epidural needle was advanced along the previously identified interlaminar space. The fluoroscope was then turned lateral view and a loss of resistance syringe was attached to the needle containing preservative free normal saline. Under lateral flouroscopic guidance, the needle was then advanced through the ligamentum flavum, entering the epidural space with a clear and crisp loss of resistance. The needle was not advanced beyond the interlaminar line at any time during this process. No CSF was noted. Aspiration was negative for blood or CSF. Additional confirmation was made with the injection of 0.5 mL of a nonionic water-soluble radiographic contrast medium (Isovue-M 200) demonstrating appropriate epidural spread and the absence of vascular uptake. Following this, a 3 mL solution comprised of 2 mL of dexamethasone (10mg/ml) and 1 mL of lidocaine 1% preservative free was injected slowly after negative aspiration. The needle was cleared of steroid solution and removed. The area was thoroughly cleaned and sterile bandages applied as necessary. The patient tolerated the procedure well and vital signs remained stable throughout the procedure. POST-PROCEDURE COURSE:  The patient was escorted from the procedure suite in satisfactory condition and recovered per facility protocol based on the type of procedure performed and/or the sedation utilized. The patient did not experience any adverse events and remained hemodynamically stable during the post-procedure period. DISCHARGE NOTE:  Upon discharge, the patient was able to tolerate fluids and was in no acute distress. The patient was oriented to person, place and time and vital signs were stable.  Appropriate post-procedure instructions were provided and explained to the patient in detail and all questions were answered.     Britt Mauro MD 7/18/2017 5:41 PM

## 2017-07-18 NOTE — H&P
18 July 2017, 9:20AM.  Patient seen and examined prior to procedure. Chart and data reviewed. No significant interval changes from previous evaluation noted. Stable for procedure as intended and discussed.  [facility]

## 2017-08-29 ENCOUNTER — OFFICE VISIT (OUTPATIENT)
Dept: PAIN MANAGEMENT | Age: 57
End: 2017-08-29

## 2017-08-29 VITALS
RESPIRATION RATE: 18 BRPM | HEART RATE: 68 BPM | WEIGHT: 226 LBS | BODY MASS INDEX: 31.52 KG/M2 | TEMPERATURE: 97.9 F | DIASTOLIC BLOOD PRESSURE: 88 MMHG | SYSTOLIC BLOOD PRESSURE: 155 MMHG

## 2017-08-29 DIAGNOSIS — S92.324D CLOSED NONDISPLACED FRACTURE OF SECOND METATARSAL BONE OF RIGHT FOOT WITH ROUTINE HEALING, SUBSEQUENT ENCOUNTER: ICD-10-CM

## 2017-08-29 DIAGNOSIS — M54.12 BRACHIAL NEURITIS: ICD-10-CM

## 2017-08-29 DIAGNOSIS — M48.02 SPINAL STENOSIS IN CERVICAL REGION: ICD-10-CM

## 2017-08-29 DIAGNOSIS — M54.12 CERVICAL RADICULOPATHY: ICD-10-CM

## 2017-08-29 DIAGNOSIS — M47.22 OSTEOARTHRITIS OF SPINE WITH RADICULOPATHY, CERVICAL REGION: ICD-10-CM

## 2017-08-29 DIAGNOSIS — M50.30 DEGENERATION OF CERVICAL INTERVERTEBRAL DISC: ICD-10-CM

## 2017-08-29 DIAGNOSIS — M54.2 CERVICALGIA: Primary | ICD-10-CM

## 2017-08-29 DIAGNOSIS — G24.3 SPASMODIC TORTICOLLIS: ICD-10-CM

## 2017-08-29 DIAGNOSIS — F51.04 CHRONIC INSOMNIA: ICD-10-CM

## 2017-08-29 RX ORDER — OXYCODONE AND ACETAMINOPHEN 10; 325 MG/1; MG/1
1 TABLET ORAL
Qty: 120 TAB | Refills: 0 | Status: SHIPPED | OUTPATIENT
Start: 2017-11-02 | End: 2017-11-22 | Stop reason: SDUPTHER

## 2017-08-29 RX ORDER — OXYCODONE AND ACETAMINOPHEN 10; 325 MG/1; MG/1
1 TABLET ORAL
Qty: 120 TAB | Refills: 0 | Status: SHIPPED | OUTPATIENT
Start: 2017-09-05 | End: 2017-08-29 | Stop reason: SDUPTHER

## 2017-08-29 RX ORDER — FENTANYL 100 UG/H
1 PATCH TRANSDERMAL
Qty: 15 PATCH | Refills: 0 | Status: SHIPPED | OUTPATIENT
Start: 2017-10-04 | End: 2017-08-29 | Stop reason: SDUPTHER

## 2017-08-29 RX ORDER — FENTANYL 25 UG/1
1 PATCH TRANSDERMAL
Qty: 15 PATCH | Refills: 0 | Status: SHIPPED | OUTPATIENT
Start: 2017-10-04 | End: 2017-08-29 | Stop reason: SDUPTHER

## 2017-08-29 RX ORDER — FENTANYL 25 UG/1
1 PATCH TRANSDERMAL
Qty: 15 PATCH | Refills: 0 | Status: SHIPPED | OUTPATIENT
Start: 2017-11-02 | End: 2017-11-22 | Stop reason: SDUPTHER

## 2017-08-29 RX ORDER — ESZOPICLONE 2 MG/1
2 TABLET, FILM COATED ORAL
Qty: 30 TAB | Refills: 5 | Status: SHIPPED | OUTPATIENT
Start: 2017-08-29 | End: 2018-02-19 | Stop reason: SDUPTHER

## 2017-08-29 RX ORDER — FENTANYL 25 UG/1
1 PATCH TRANSDERMAL
Qty: 15 PATCH | Refills: 0 | Status: SHIPPED | OUTPATIENT
Start: 2017-09-05 | End: 2017-08-29 | Stop reason: SDUPTHER

## 2017-08-29 RX ORDER — OXYCODONE AND ACETAMINOPHEN 10; 325 MG/1; MG/1
1 TABLET ORAL
Qty: 120 TAB | Refills: 0 | Status: SHIPPED | OUTPATIENT
Start: 2017-10-04 | End: 2017-08-29 | Stop reason: SDUPTHER

## 2017-08-29 RX ORDER — FENTANYL 100 UG/H
1 PATCH TRANSDERMAL
Qty: 15 PATCH | Refills: 0 | Status: SHIPPED | OUTPATIENT
Start: 2017-11-02 | End: 2017-11-22 | Stop reason: SDUPTHER

## 2017-08-29 RX ORDER — FENTANYL 100 UG/H
1 PATCH TRANSDERMAL
Qty: 15 PATCH | Refills: 0 | Status: SHIPPED | OUTPATIENT
Start: 2017-09-05 | End: 2017-08-29 | Stop reason: SDUPTHER

## 2017-08-29 NOTE — PROGRESS NOTES
HISTORY OF PRESENT ILLNESS  Omero Patel is a 61 y.o. female. Patient presents for follow up of chronic neck pain due to cervical spondylosis and stenosis secondary to an industrial accident sustained several years ago. She reports that she suddenly developed right foot pain one day after walking on the beach about six weeks ago. She denies injury or falls. She reports that the foot became extremely swollen, red and painful, and she could hardly weight bear. She went to the ER, as well as her PCP, where x-rays performed were negative at that time for fracture. She also had labs examining for infection/cellulitis, which were negative. She was presumptively dx'd with gout and discharged. After two weeks of persistent pain and swelling, she was referred to podiatry. Repeat x-rays revealed a march fracture of the second metatarsal. She was put in a foot brace and had repeat x-rays yesterday, which reportedly showed satisfactory healing. Given her history of osteopenia, as well as postmenopausal status and chronic, high-dose opioid use, we discussed that this fracture puts her into the \"osteoporosis\" category, with a higher fracture risk. She was counseled to discuss starting a bone-strengthening regimen with her PCP post-haste. She also needs to have paperwork filled out justifying her need for opioids in the context of chronic pain for the [employer], which has declined her license renewal after she informed them that she takes opioids for her pain. This is apparently a new regulation. She denies any car accidents or tickets/arrests. This paperwork was partially filled out in the presence of the patient and will be faxed to the [facility]. Neck pain, while constant, remains well controlled. Recent cervical MARILOU was very helpful for neck and arm pain, reducing pain by more than 50% for the past seven weeks. She may wish to have this repeated in the next couple of months if pain recurs.  Pt reports average of 4-5/10 pain scale most days with medications. Medications continue to work well for pain control overall. Shaun Estrada is tolerating medications well, with no untoward side effects noted. She is able to stay more active with less discomfort with these current doses. The patient reports an average of 60% relief with this regimen. Most recent UDS and  were consistent with prescribed medications. Pill counts are appropriate. She is informed of side effects, risks, and benefits of this regimen, and emphasizes that she derives a significant improvement in functionality and quality of life, and notes that non-opioid medications and therapies in the past have not offered significant benefit. She denies new or worsening insomnia or constipation issues. A total of 45 minutes was spent with the patient of which more than 50% of the time was spent counseling the patient. HPI--see above    ROS  HENT: Positive for neck pain (s/p cervical MARILOU, >50% relief ). Respiratory: Negative. Cardiovascular: Negative. Gastrointestinal: Negative for nausea, vomiting and constipation (controlled with Amitiza ). Genitourinary: Negative. Musculoskeletal: Positive for back pain (thoracic ) and joint pain. Negative for myalgias and falls. Muscle cramps (in lower legs)    Psychiatric/Behavioral: Positive for depression and anxiety, negative for suicidal ideas, hallucinations and substance abuse. The patient has insomnia   Physical Exam  Head: Normocephalic and atraumatic. Eyes: Pupils are equal, round, and reactive to light. Right eye exhibits no discharge. Left eye exhibits no discharge. Glasses   Pulmonary/Chest: Effort normal. No respiratory distress. Musculoskeletal: She exhibits tenderness. She exhibits no edema. Cervical back: She exhibits decreased range of motion and tenderness. Back:  palpable areas of muscle nodularity and tightness along the upper ridge of the trapezius muscle on the left  Right foot in brace. Ambulates gingerly with a cane  Neurological: She is alert and oriented to person, place, and time. No cranial nerve deficit (grossly intact). Skin: Skin is warm and dry. She is not diaphoretic. No erythema. Psychiatric: She has a normal mood and affect. Her behavior is normal. Judgment and thought content normal.  ASSESSMENT and PLAN    ICD-10-CM ICD-9-CM    1. Cervicalgia M54.2 723.1    2. Closed nondisplaced fracture of second metatarsal bone of right foot with routine healing, subsequent encounter S92.324D V54.19    3. Brachial neuritis M54.12 723.4    4. Spasmodic torticollis G24.3 333.83    5. Osteoarthritis of spine with radiculopathy, cervical region M47.22 721.0    6. Cervical radiculopathy M54.12 723.4    7. Degeneration of cervical intervertebral disc M50.30 722.4    8. Spinal stenosis in cervical region M48.02 723.0    9. Chronic insomnia F51.04 780.52    10.  Chronic migraine G43.709 346.70       Plan:  Continue same medications as prescribed for chronic pain  Paperwork will be filled out and faxed to the [facility] ASAP  Follow up in 3 months or sooner if needed  Regular exercise and attention to emotional health and diet remain the most effective ways to treat chronic pain of all kinds  You may contact me with questions or concerns through [address]

## 2017-08-29 NOTE — MR AVS SNAPSHOT
Visit Information Date & Time Provider Department Dept. Phone Encounter #  
 8/29/2017  1:20 PM Saumya Frazier [facility] [phone] [id number] Follow-up Instructions Return in about 3 months (around 11/29/2017). Upcoming Health Maintenance Date Due Hepatitis C Screening 1960 DTaP/Tdap/Td series (1 - Tdap) 6/29/1980 PAP AKA CERVICAL CYTOLOGY 6/29/1980 BREAST CANCER SCRN MAMMOGRAM 6/29/2009 FOBT Q 1 YEAR AGE 50-75 6/29/2009 INFLUENZA AGE 9 TO ADULT 8/1/2017 Allergies as of 8/29/2017  Review Complete On: 8/29/2017 By: Karyn Kumar LPN Severity Noted Reaction Type Reactions Oxycontin [Oxycodone]   Intolerance Not Reported This Time Pt states not allergic to oxycontin Current Immunizations  Never Reviewed No immunizations on file. Not reviewed this visit You Were Diagnosed With   
  
 Codes Comments Cervicalgia    -  Primary ICD-10-CM: M54.2 ICD-9-CM: 723.1 Closed nondisplaced fracture of second metatarsal bone of right foot with routine healing, subsequent encounter     ICD-10-CM: B28.865Z ICD-9-CM: V54.19 Brachial neuritis     ICD-10-CM: M54.12 
ICD-9-CM: 723.4 Spasmodic torticollis     ICD-10-CM: G24.3 ICD-9-CM: 333.83 Osteoarthritis of spine with radiculopathy, cervical region     ICD-10-CM: M47.22 
ICD-9-CM: 721.0 Cervical radiculopathy     ICD-10-CM: M54.12 
ICD-9-CM: 723.4 Degeneration of cervical intervertebral disc     ICD-10-CM: M50.30 ICD-9-CM: 722.4 Spinal stenosis in cervical region     ICD-10-CM: M48.02 
ICD-9-CM: 723.0 Chronic insomnia     ICD-10-CM: F51.04 
ICD-9-CM: 780.52 Chronic migraine     ICD-10-CM: C11.794 ICD-9-CM: 346.70 Vitals BP Pulse Temp Resp Weight(growth percentile) BMI  
 155/88 68 97.9 °F (36.6 °C) 18 226 lb (102.5 kg) 31.52 kg/m2 OB Status Smoking Status Hysterectomy Never Smoker Vitals History BMI and BSA Data Body Mass Index Body Surface Area  
 31.52 kg/m 2 2.27 m 2 Preferred Pharmacy Pharmacy Name Phone [facility] - [facility] [phone] Your Updated Medication List  
  
   
This list is accurate as of: 8/29/17  1:58 PM.  Always use your most recent med list.  
  
  
  
  
 CRESTOR 10 mg tablet Generic drug:  rosuvastatin Take 10 mg by mouth nightly.  
  
 ergocalciferol 50,000 unit capsule Commonly known as:  ERGOCALCIFEROL Take 1 Cap by mouth as directed. Once  a week  
  
 eszopiclone 2 mg tablet Commonly known as:  Corina Doing Take 1 Tab by mouth nightly for 30 days. Max Daily Amount: 2 mg. Indications: INSOMNIA * fentaNYL 100 mcg/hr PATCH Commonly known as:  DURAGESIC  
1 Patch by TransDERmal route every fourty-eight (48) hours. Max Daily Amount: 1 Patch. for chronic, severe, refractory pain. Maryjane Reyes brands only * fentaNYL 25 mcg/hr PATCH Commonly known as:  DURAGESIC  
1 Patch by TransDERmal route every fourty-eight (48) hours for 30 days. Max Daily Amount: 1 Patch. for chronic, severe, refractory pain. Valle brands only  Indications: CHRONIC PAIN WITH OPIOID TOLERANCE, SEVERE PAIN WITH OPIOID TOLERANCE  
  
 * fentaNYL 25 mcg/hr PATCH Commonly known as:  DURAGESIC  
1 Patch by TransDERmal route every fourty-eight (48) hours for 30 days. Max Daily Amount: 1 Patch. for chronic, severe, refractory pain. Valle brands only  Indications: CHRONIC PAIN WITH OPIOID TOLERANCE, SEVERE PAIN WITH OPIOID TOLERANCE  
  
 * fentaNYL 100 mcg/hr PATCH Commonly known as:  DURAGESIC  
1 Patch by TransDERmal route every fourty-eight (48) hours for 30 days. Max Daily Amount: 1 Patch. for chronic, severe, refractory pain.  Valle brands only  Indications: CHRONIC PAIN WITH OPIOID TOLERANCE, SEVERE PAIN WITH OPIOID TOLERANCE  
  
 * fentaNYL 100 mcg/hr PATCH  
 Commonly known as:  DURAGESIC  
1 Patch by TransDERmal route every fourty-eight (48) hours for 30 days. Max Daily Amount: 1 Patch. for chronic, severe, refractory pain. Valle brands only  Indications: Chronic Pain with Opioid Tolerance, SEVERE PAIN WITH OPIOID TOLERANCE  
  
 * fentaNYL 25 mcg/hr PATCH Commonly known as:  DURAGESIC  
1 Patch by TransDERmal route every fourty-eight (48) hours for 30 days. Max Daily Amount: 1 Patch. for chronic, severe, refractory pain. Valle brands only  Indications: Chronic Pain with Opioid Tolerance, SEVERE PAIN WITH OPIOID TOLERANCE Start taking on:  11/2/2017 * fentaNYL 100 mcg/hr PATCH Commonly known as:  DURAGESIC  
1 Patch by TransDERmal route every fourty-eight (48) hours for 30 days. Max Daily Amount: 1 Patch. for chronic, severe, refractory pain. Valle brands only  Indications: Chronic Pain with Opioid Tolerance, SEVERE PAIN WITH OPIOID TOLERANCE Start taking on:  11/2/2017  
  
 lidocaine 5 % Commonly known as:  LIDODERM  
1 Patch by TransDERmal route every twenty-four (24) hours. Apply patch to the affected area for 12 hours a day and remove for 12 hours a day.  
  
 naloxegol 25 mg Tab tablet Commonly known as:  Marathon Patricia Take 1 Tab by mouth daily. Take daily before breakfast for opioid induced constipation  Indications: OPIOID-INDUCED CONSTIPATION  
  
 naloxone 4 mg/actuation Spry 4 mg by Nasal route once as needed (opioid overdose) for up to 1 dose. May substitute 2 mg syringe if insurance requires NORVASC 10 mg tablet Generic drug:  amLODIPine Take  by mouth daily. * oxyCODONE-acetaminophen  mg per tablet Commonly known as:  PERCOCET Take 1 Tab by mouth four (4) times daily as needed for Pain for up to 30 days. Max Daily Amount: 4 Tabs. Indications: PAIN  
  
 * PERCOCET  mg per tablet Generic drug:  oxyCODONE-acetaminophen Take 1 Tab by mouth four (4) times daily as needed for Pain for up to 30 days. Max Daily Amount: 4 Tabs. Indications: PAIN  
  
 * oxyCODONE-acetaminophen  mg per tablet Commonly known as:  PERCOCET Take 1 Tab by mouth four (4) times daily as needed for Pain for up to 30 days. Max Daily Amount: 4 Tabs. Indications: Pain Start taking on:  2017 SYNTHROID 175 mcg tablet Generic drug:  levothyroxine Take 200 mcg by mouth Daily (before breakfast). TENS Units Sherry Comp Commonly known as:  TENS 502  
1 Units by Does Not Apply route as directed. Transparent Dressings 3 1/2 X 4 \" Bndg Commonly known as:  TEGADERM  
2 Patches by Apply Externally route every fourty-eight (48) hours. Dispense two boxes XANAX 0.5 mg tablet Generic drug:  ALPRAZolam  
Take 1 mg by mouth three (3) times daily as needed. Take 1- 1/2 tabs tid prn * Notice: This list has 10 medication(s) that are the same as other medications prescribed for you. Read the directions carefully, and ask your doctor or other care provider to review them with you. Prescriptions Printed Refills  
 fentaNYL (DURAGESIC) 25 mcg/hr PATCH 0 Starting on: 2017 Si Patch by TransDERmal route every fourty-eight (48) hours for 30 days. Max Daily Amount: 1 Patch. for chronic, severe, refractory pain. Valle brands only  Indications: Chronic Pain with Opioid Tolerance, SEVERE PAIN WITH OPIOID TOLERANCE Class: Print Route: TransDERmal  
 fentaNYL (DURAGESIC) 100 mcg/hr PATCH 0 Starting on: 2017 Si Patch by TransDERmal route every fourty-eight (48) hours for 30 days. Max Daily Amount: 1 Patch. for chronic, severe, refractory pain. Valle brands only  Indications: Chronic Pain with Opioid Tolerance, SEVERE PAIN WITH OPIOID TOLERANCE Class: Print Route: TransDERmal  
 oxyCODONE-acetaminophen (PERCOCET)  mg per tablet 0 Starting on: 2017  Sig: Take 1 Tab by mouth four (4) times daily as needed for Pain for up to 30 days. Max Daily Amount: 4 Tabs. Indications: Pain Class: Print Route: Oral  
 eszopiclone (LUNESTA) 2 mg tablet 5 Sig: Take 1 Tab by mouth nightly for 30 days. Max Daily Amount: 2 mg. Indications: INSOMNIA Class: Print Route: Oral  
  
Follow-up Instructions Return in about 3 months (around 11/29/2017). Introducing [facility]! Dear Domingo Martes: Thank you for requesting a [employer] account. Our records indicate that you have previously registered for a [employer] account but its currently inactive. Please call our [employer] support line at [phone]. Additional Information If you have questions, please visit the Frequently Asked Questions section of the [employer] website at [email or web address] Remember, [employer] is NOT to be used for urgent needs. For medical emergencies, dial 911. Now available from your iPhone and Android! Please provide this summary of care documentation to your next provider. Your primary care clinician is listed as Gay Constantino. If you have any questions after today's visit, please call [phone].

## 2017-08-29 NOTE — PROGRESS NOTES
Nursing Notes    Patient presents to the office today in follow-up. Patient rates her pain at 6/10 on the numerical pain scale. Reviewed medications with counts as follows:    Rx Date filled Qty Dispensed Pill Count Last Dose Short   Oxycodone 10 mg 08/07/17 120 38 This am no   Fentanyl 25 mcg 08/07/17 15 4 This am no   Fentanyl 100 mcg 08/07/17 15 4 This am no         Comments: Patient is here today for a follow up appt today she states her pain level is a 6  She states her foot is broken . POC UDS was not performed in office today    Any new labs or imaging since last appointment? YES Labs done at [address] done at [address] you been to an emergency room (ER) or urgent care clinic since your last visit? YES   [place]     Have you been hospitalized since your last visit? NO     If yes, where, when, and reason for visit? Have you seen or consulted any other health care providers outside of the [address] [place] [address] [facility]  since your last visit? YES   [place] [facility]   If yes, where, when, and reason for visit? Ms. Rodolfo Iniguez has a reminder for a \"due or due soon\" health maintenance. I have asked that she contact her primary care provider for follow-up on this health maintenance.

## 2017-11-22 ENCOUNTER — OFFICE VISIT (OUTPATIENT)
Dept: PAIN MANAGEMENT | Age: 57
End: 2017-11-22

## 2017-11-22 VITALS
WEIGHT: 226 LBS | RESPIRATION RATE: 18 BRPM | HEART RATE: 60 BPM | SYSTOLIC BLOOD PRESSURE: 145 MMHG | HEIGHT: 71 IN | TEMPERATURE: 99.2 F | BODY MASS INDEX: 31.64 KG/M2 | DIASTOLIC BLOOD PRESSURE: 81 MMHG

## 2017-11-22 DIAGNOSIS — G24.3 SPASMODIC TORTICOLLIS: ICD-10-CM

## 2017-11-22 DIAGNOSIS — M54.12 CERVICAL RADICULOPATHY: ICD-10-CM

## 2017-11-22 DIAGNOSIS — M54.12 BRACHIAL NEURITIS: ICD-10-CM

## 2017-11-22 DIAGNOSIS — M48.02 SPINAL STENOSIS IN CERVICAL REGION: ICD-10-CM

## 2017-11-22 DIAGNOSIS — M54.2 CERVICALGIA: ICD-10-CM

## 2017-11-22 DIAGNOSIS — G89.4 CHRONIC PAIN SYNDROME: ICD-10-CM

## 2017-11-22 DIAGNOSIS — M50.30 DEGENERATION OF CERVICAL INTERVERTEBRAL DISC: ICD-10-CM

## 2017-11-22 DIAGNOSIS — M47.22 OSTEOARTHRITIS OF SPINE WITH RADICULOPATHY, CERVICAL REGION: ICD-10-CM

## 2017-11-22 RX ORDER — FENTANYL 100 UG/H
1 PATCH TRANSDERMAL
Qty: 15 PATCH | Refills: 0 | Status: SHIPPED | OUTPATIENT
Start: 2017-12-28 | End: 2018-02-19 | Stop reason: SDUPTHER

## 2017-11-22 RX ORDER — FENTANYL 25 UG/1
1 PATCH TRANSDERMAL
Qty: 15 PATCH | Refills: 0 | Status: SHIPPED | OUTPATIENT
Start: 2017-11-29 | End: 2018-05-21

## 2017-11-22 RX ORDER — FENTANYL 100 UG/H
1 PATCH TRANSDERMAL
Qty: 15 PATCH | Refills: 0 | Status: SHIPPED | OUTPATIENT
Start: 2017-11-29 | End: 2018-02-19 | Stop reason: SDUPTHER

## 2017-11-22 RX ORDER — FENTANYL 25 UG/1
1 PATCH TRANSDERMAL
Qty: 15 PATCH | Refills: 0 | Status: SHIPPED | OUTPATIENT
Start: 2018-01-27 | End: 2018-05-21

## 2017-11-22 RX ORDER — OXYCODONE AND ACETAMINOPHEN 10; 325 MG/1; MG/1
1 TABLET ORAL
Qty: 120 TAB | Refills: 0 | Status: SHIPPED | OUTPATIENT
Start: 2017-12-28 | End: 2018-02-19 | Stop reason: SDUPTHER

## 2017-11-22 RX ORDER — OXYCODONE AND ACETAMINOPHEN 10; 325 MG/1; MG/1
1 TABLET ORAL
Qty: 120 TAB | Refills: 0 | Status: SHIPPED | OUTPATIENT
Start: 2017-11-29 | End: 2018-02-19 | Stop reason: SDUPTHER

## 2017-11-22 RX ORDER — OXYCODONE AND ACETAMINOPHEN 10; 325 MG/1; MG/1
1 TABLET ORAL
Qty: 120 TAB | Refills: 0 | Status: SHIPPED | OUTPATIENT
Start: 2018-01-27 | End: 2018-02-19 | Stop reason: SDUPTHER

## 2017-11-22 RX ORDER — NAPROXEN 500 MG/1
500 TABLET ORAL
COMMUNITY
End: 2019-04-02 | Stop reason: SDUPTHER

## 2017-11-22 RX ORDER — FENTANYL 100 UG/H
1 PATCH TRANSDERMAL
Qty: 15 PATCH | Refills: 0 | Status: SHIPPED | OUTPATIENT
Start: 2018-01-27 | End: 2018-02-19 | Stop reason: SDUPTHER

## 2017-11-22 RX ORDER — FENTANYL 25 UG/1
1 PATCH TRANSDERMAL
Qty: 15 PATCH | Refills: 0 | Status: SHIPPED | OUTPATIENT
Start: 2017-12-28 | End: 2018-05-21

## 2017-11-22 NOTE — PROGRESS NOTES
HISTORY OF PRESENT ILLNESS  Jeff Rajan is a 61 y.o. female    HPI: Ms. Nas Worthy  returns today for f/u of chronic neck pain due to cervical spondylosis and stenosis secondary to an industrial accident sustained several years ago. No h/o neck surgery. Good improvement with C7-T1 MARILOU injections by Dr. Jaimee Kerr last one on 7/18/2017. Today is my first visit with Ms. Nas Worthy. She continues with pain unchanged since last visit. We discussed her current condition and medications in detail today. She has been doing well with her current treatment plan with no current issues. We had a very lengthy conversation about long-acting versus short acting medication. We discussed the departure of Dr. Jaime Suero from our practice in detail. I explained to her that moving forward we will have to make some adjustments in her current treatment plan. She is very reluctant in changing her regimen at this time and prefers to remain with Dr. Jaime Suero. She was given a list of outside providers as well. We discussed transition plan in detail. I will schedule her follow-up appointment in 3 months. She was advised to call and cancel his appointment as well as her pain management agreement if she decides to transition her care by that time. She has also been receiving cervical epidural injections by Dr. Jaimee Kerr and is supposed to have a series of 3. She has completed her first injection but has not been scheduled for her second and third injection at this time. I will have her discuss this with Dr. Jaimee Kerr schedule her for further recommendation. Medications are helping with pain control and quality of life. Her pain is 4-5/10 with medication and 6-7/10 without. Pt describes pain as throbbing, spasms, and aching. Aggravating factors include cold weather and certain movments. Relieved with rest, medication, and avoiding painful activities.  Current treatment is helping to improve general activity, mood, walking, sleep, enjoyment of life    In the past 30 days, the patient reports approximately 20-30% pain relief with current treatment/medications. Pt does report constipation but is well controlled with Movantik  She  is otherwise doing well with no other complaints today. She denies any adverse events including nausea, vomiting, dizziness, increased constipation, hallucinations, or seizures. Because the patient's current regimen places him/her at increased risk for possible overdose, a prescription for naloxone nasal spray has been provided. The patient understands that this medication is only to be used in the setting of a possible overdose and that inadvertent use of this medication could precipitate overt withdrawal.         Allergies   Allergen Reactions    Oxycontin [Oxycodone] Not Reported This Time     Pt states not allergic to oxycontin         Past Surgical History:   Procedure Laterality Date    HX BREAST BIOPSY      Negative    HX CHOLECYSTECTOMY      HX HYSTERECTOMY      HX LITHOTRIPSY      HX OOPHORECTOMY      HX THYROIDECTOMY      Partial thyroidectomy         Review of Systems   Constitutional: Negative for chills, fever and weight loss. HENT: Negative for congestion and sore throat. Eyes: Negative for blurred vision and double vision. Respiratory: Negative for cough, shortness of breath and wheezing. Cardiovascular: Negative for chest pain and palpitations. Gastrointestinal: Negative for abdominal pain, constipation, diarrhea, heartburn, nausea and vomiting. Genitourinary: Negative. Neurological: Negative for dizziness, seizures, loss of consciousness and headaches. Endo/Heme/Allergies: Does not bruise/bleed easily. Psychiatric/Behavioral: Negative for depression. The patient is not nervous/anxious. Physical Exam   Constitutional: She is oriented to person, place, and time and well-developed, well-nourished, and in no distress. No distress.    overweight   HENT:   Head: Normocephalic and atraumatic. Eyes: EOM are normal.   Pulmonary/Chest: Effort normal.   Neurological: She is alert and oriented to person, place, and time. Skin: Skin is warm and dry. No rash noted. She is not diaphoretic. No erythema. Psychiatric: Mood, memory, affect and judgment normal.   Nursing note and vitals reviewed. ASSESSMENT:    1. Chronic pain syndrome    2. Cervical radiculopathy    3. Osteoarthritis of spine with radiculopathy, cervical region    4. Spasmodic torticollis    5. Cervicalgia    6. Brachial neuritis    7. Degeneration of cervical intervertebral disc    8. Spinal stenosis in cervical region           [place] Prescription Monitoring Program was reviewed which does not demonstrate aberrancies and/or inconsistencies with regard to the historical prescribing of controlled medications to this patient by other providers. PLAN / Pt Instructions:  1. Continue current plan with no evidence of addiction or diversion. Stable on current medication without adverse events. 2. Refill fentanyl 100 mcg patch along with 25 mcg patch to equal 125 mcg every 48 hours. Discussed tapering plan in detail today. Plan to begin tapering next visit if needed. 3. Refill oxycodone 10/325 mg up to 4 times daily as needed. 4. Schedule repeat injection with Dr. Izabel Wang. This will be discussed with his . 5. Naloxone 4 mg nasal spray for opioid induced respiratory depression emergency only. 6. Discussed risks of addiction, dependency, and opioid induced hyperalgesia. 7. Return to clinic in 3 months. Please call and cancel this appointment along with your pain management agreement if you do decide to transition her care by this time. Medications Ordered Today   Medications    fentaNYL (DURAGESIC) 100 mcg/hr PATCH     Si Patch by TransDERmal route every fourty-eight (48) hours for 30 days. Max Daily Amount: 1 Patch. for chronic, severe, refractory pain.  [employer] brands only  Indications: Chronic Pain with Opioid Tolerance, SEVERE PAIN WITH OPIOID TOLERANCE     Dispense:  15 Patch     Refill:  0    fentaNYL (DURAGESIC) 100 mcg/hr PATCH     Si Patch by TransDERmal route every fourty-eight (48) hours for 30 days. Max Daily Amount: 1 Patch. for chronic, severe, refractory pain. [employer] brands only  Indications: Chronic Pain with Opioid Tolerance, SEVERE PAIN WITH OPIOID TOLERANCE     Dispense:  15 Patch     Refill:  0    fentaNYL (DURAGESIC) 100 mcg/hr PATCH     Si Patch by TransDERmal route every fourty-eight (48) hours for 30 days. Max Daily Amount: 1 Patch. for chronic, severe, refractory pain. [employer] brands only  Indications: Chronic Pain with Opioid Tolerance, SEVERE PAIN WITH OPIOID TOLERANCE     Dispense:  15 Patch     Refill:  0    fentaNYL (DURAGESIC) 25 mcg/hr PATCH     Si Patch by TransDERmal route every fourty-eight (48) hours for 30 days. Max Daily Amount: 1 Patch. for chronic, severe, refractory pain. [employer] brands only  Indications: Chronic Pain with Opioid Tolerance, SEVERE PAIN WITH OPIOID TOLERANCE     Dispense:  15 Patch     Refill:  0    fentaNYL (DURAGESIC) 25 mcg/hr PATCH     Si Patch by TransDERmal route every fourty-eight (48) hours for 30 days. Max Daily Amount: 1 Patch. for chronic, severe, refractory pain. [employer] brands only  Indications: Chronic Pain with Opioid Tolerance, SEVERE PAIN WITH OPIOID TOLERANCE     Dispense:  15 Patch     Refill:  0    fentaNYL (DURAGESIC) 25 mcg/hr PATCH     Si Patch by TransDERmal route every fourty-eight (48) hours for 30 days. Max Daily Amount: 1 Patch. for chronic, severe, refractory pain. [employer] brands only  Indications: Chronic Pain with Opioid Tolerance, SEVERE PAIN WITH OPIOID TOLERANCE     Dispense:  15 Patch     Refill:  0    oxyCODONE-acetaminophen (PERCOCET)  mg per tablet     Sig: Take 1 Tab by mouth four (4) times daily as needed for Pain for up to 30 days. Max Daily Amount: 4 Tabs.  Indications: Pain Dispense:  120 Tab     Refill:  0    oxyCODONE-acetaminophen (PERCOCET)  mg per tablet     Sig: Take 1 Tab by mouth four (4) times daily as needed for Pain for up to 30 days. Max Daily Amount: 4 Tabs. Indications: Pain     Dispense:  120 Tab     Refill:  0    oxyCODONE-acetaminophen (PERCOCET)  mg per tablet     Sig: Take 1 Tab by mouth four (4) times daily as needed for Pain for up to 30 days. Max Daily Amount: 4 Tabs. Indications: Pain     Dispense:  120 Tab     Refill:  0         DISPOSITION     Pain medications are prescribed with the objective of pain relief and improved physical and psychosocial function in this patient.  Pain Meds and Quality Of Life have been reviewed. Nonpharmacologic therapy and non-opioid pharmacologic therapy have been considered. Opioid therapy is only prescribed if the expected benefits are anticipated to outweigh risks.  Counseled patient on proper use of prescribed medications.  Counseled patient about chronic medical conditions and their relationship to anxiety and depression and recommended mental health support as needed.  Reviewed with patient self-help tools, home exercise, and lifestyle changes to assist the patient in self-management of symptoms.  Reviewed with patient the treatment plan, goals of treatment plan, and limitations of treatment plan, to include the potential for side effects from medications and procedures. If side effects occur, it is the responsibility of the patient to inform the clinic so that a change in the treatment plan can be made in a safe manner. The patient is advised that stopping prescribed medication may cause an increase in symptoms and possible medication withdrawal symptoms. The patient is informed an emergency room evaluation may be necessary if this occurs. Spent 25 minutes with patient today which more than 50% of that time was spent on counseling and coordination of care.         LETICIA Marie 11/22/2017        Note: Please excuse any typographical errors. Voice recognition software was used for this note and may cause mistakes.

## 2017-11-22 NOTE — MR AVS SNAPSHOT
Visit Information Date & Time Provider Department Dept. Phone Encounter #  
 11/22/2017 12:40 PM Juan Pablo Boudreaux [facility] [id number] Follow-up Instructions Return in about 3 months (around 2/22/2018). Upcoming Health Maintenance Date Due Hepatitis C Screening 1960 DTaP/Tdap/Td series (1 - Tdap) 6/29/1980 PAP AKA CERVICAL CYTOLOGY 6/29/1980 BREAST CANCER SCRN MAMMOGRAM 6/29/2009 FOBT Q 1 YEAR AGE 50-75 6/29/2009 Influenza Age 5 to Adult 8/1/2017 Allergies as of 11/22/2017  Review Complete On: 11/22/2017 By: LETICIA Lester Severity Noted Reaction Type Reactions Oxycontin [Oxycodone]   Intolerance Not Reported This Time Pt states not allergic to oxycontin Current Immunizations  Never Reviewed No immunizations on file. Not reviewed this visit You Were Diagnosed With   
  
 Codes Comments Chronic pain syndrome     ICD-10-CM: G89.4 ICD-9-CM: 338.4 Cervical radiculopathy     ICD-10-CM: M54.12 
ICD-9-CM: 723.4 Osteoarthritis of spine with radiculopathy, cervical region     ICD-10-CM: M47.22 
ICD-9-CM: 721.0 Spasmodic torticollis     ICD-10-CM: G24.3 ICD-9-CM: 333.83 Cervicalgia     ICD-10-CM: M54.2 ICD-9-CM: 723.1 Brachial neuritis     ICD-10-CM: M54.12 
ICD-9-CM: 723.4 Degeneration of cervical intervertebral disc     ICD-10-CM: M50.30 ICD-9-CM: 722.4 Spinal stenosis in cervical region     ICD-10-CM: M48.02 
ICD-9-CM: 723.0 Vitals BP Pulse Temp Resp Height(growth percentile) Weight(growth percentile) 145/81 (BP 1 Location: Right arm, BP Patient Position: Sitting) 60 99.2 °F (37.3 °C) (Oral) 18 5' 11\" (1.803 m) 226 lb (102.5 kg) BMI OB Status Smoking Status 31.52 kg/m2 Hysterectomy Never Smoker BMI and BSA Data Body Mass Index Body Surface Area  
 31.52 kg/m 2 2.27 m 2 Preferred Pharmacy Pharmacy Name Phone [phone] Your Updated Medication List  
  
   
This list is accurate as of: 11/22/17  1:32 PM.  Always use your most recent med list.  
  
  
  
  
 CRESTOR 10 mg tablet Generic drug:  rosuvastatin Take 10 mg by mouth nightly.  
  
 ergocalciferol 50,000 unit capsule Commonly known as:  ERGOCALCIFEROL Take 1 Cap by mouth as directed. Once  a week * fentaNYL 100 mcg/hr PATCH Commonly known as:  DURAGESIC  
1 Patch by TransDERmal route every fourty-eight (48) hours. Max Daily Amount: 1 Patch. for chronic, severe, refractory pain. [employer] brands only * fentaNYL 25 mcg/hr PATCH Commonly known as:  DURAGESIC  
1 Patch by TransDERmal route every fourty-eight (48) hours for 30 days. Max Daily Amount: 1 Patch. for chronic, severe, refractory pain. [employer] brands only  Indications: CHRONIC PAIN WITH OPIOID TOLERANCE, SEVERE PAIN WITH OPIOID TOLERANCE  
  
 * fentaNYL 25 mcg/hr PATCH Commonly known as:  DURAGESIC  
1 Patch by TransDERmal route every fourty-eight (48) hours for 30 days. Max Daily Amount: 1 Patch. for chronic, severe, refractory pain. [employer] brands only  Indications: CHRONIC PAIN WITH OPIOID TOLERANCE, SEVERE PAIN WITH OPIOID TOLERANCE  
  
 * fentaNYL 100 mcg/hr PATCH Commonly known as:  DURAGESIC  
1 Patch by TransDERmal route every fourty-eight (48) hours for 30 days. Max Daily Amount: 1 Patch. for chronic, severe, refractory pain. [employer] brands only  Indications: CHRONIC PAIN WITH OPIOID TOLERANCE, SEVERE PAIN WITH OPIOID TOLERANCE  
  
 * fentaNYL 100 mcg/hr PATCH Commonly known as:  DURAGESIC  
1 Patch by TransDERmal route every fourty-eight (48) hours for 30 days. Max Daily Amount: 1 Patch. for chronic, severe, refractory pain.  [employer]le brands only  Indications: Chronic Pain with Opioid Tolerance, SEVERE PAIN WITH OPIOID TOLERANCE  
  
 * fentaNYL 100 mcg/hr PATCH  
 Commonly known as:  DURAGESIC  
1 Patch by TransDERmal route every fourty-eight (48) hours for 30 days. Max Daily Amount: 1 Patch. for chronic, severe, refractory pain. [employer] brands only  Indications: Chronic Pain with Opioid Tolerance, SEVERE PAIN WITH OPIOID TOLERANCE Start taking on:  11/29/2017 * fentaNYL 25 mcg/hr PATCH Commonly known as:  DURAGESIC  
1 Patch by TransDERmal route every fourty-eight (48) hours for 30 days. Max Daily Amount: 1 Patch. for chronic, severe, refractory pain. Valle brands only  Indications: Chronic Pain with Opioid Tolerance, SEVERE PAIN WITH OPIOID TOLERANCE Start taking on:  11/29/2017 * fentaNYL 100 mcg/hr PATCH Commonly known as:  DURAGESIC  
1 Patch by TransDERmal route every fourty-eight (48) hours for 30 days. Max Daily Amount: 1 Patch. for chronic, severe, refractory pain. [employer] brands only  Indications: Chronic Pain with Opioid Tolerance, SEVERE PAIN WITH OPIOID TOLERANCE Start taking on:  12/28/2017 * fentaNYL 25 mcg/hr PATCH Commonly known as:  DURAGESIC  
1 Patch by TransDERmal route every fourty-eight (48) hours for 30 days. Max Daily Amount: 1 Patch. for chronic, severe, refractory pain. Valle brands only  Indications: Chronic Pain with Opioid Tolerance, SEVERE PAIN WITH OPIOID TOLERANCE Start taking on:  12/28/2017 * fentaNYL 100 mcg/hr PATCH Commonly known as:  DURAGESIC  
1 Patch by TransDERmal route every fourty-eight (48) hours for 30 days. Max Daily Amount: 1 Patch. for chronic, severe, refractory pain. [employer] brands only  Indications: Chronic Pain with Opioid Tolerance, SEVERE PAIN WITH OPIOID TOLERANCE Start taking on:  1/27/2018 * fentaNYL 25 mcg/hr PATCH Commonly known as:  DURAGESIC  
1 Patch by TransDERmal route every fourty-eight (48) hours for 30 days. Max Daily Amount: 1 Patch. for chronic, severe, refractory pain.  [employer] brands only  Indications: Chronic Pain with Opioid Tolerance, SEVERE PAIN WITH OPIOID TOLERANCE Start taking on:  1/27/2018  
  
 lidocaine 5 % Commonly known as:  LIDODERM  
1 Patch by TransDERmal route every twenty-four (24) hours. Apply patch to the affected area for 12 hours a day and remove for 12 hours a day.  
  
 naloxegol 25 mg Tab tablet Commonly known as:  Sherman Dion Take 1 Tab by mouth daily. Take daily before breakfast for opioid induced constipation  Indications: OPIOID-INDUCED CONSTIPATION  
  
 naloxone 4 mg/actuation nasal spray Commonly known as:  NARCAN  
4 mg by Nasal route once as needed (opioid overdose) for up to 1 dose. May substitute 2 mg syringe if insurance requires  
  
 naproxen 500 mg tablet Commonly known as:  NAPROSYN Take 500 mg by mouth daily (with breakfast). NORVASC 10 mg tablet Generic drug:  amLODIPine Take  by mouth daily. * oxyCODONE-acetaminophen  mg per tablet Commonly known as:  PERCOCET Take 1 Tab by mouth four (4) times daily as needed for Pain for up to 30 days. Max Daily Amount: 4 Tabs. Indications: PAIN  
  
 * PERCOCET  mg per tablet Generic drug:  oxyCODONE-acetaminophen Take 1 Tab by mouth four (4) times daily as needed for Pain for up to 30 days. Max Daily Amount: 4 Tabs. Indications: PAIN  
  
 * oxyCODONE-acetaminophen  mg per tablet Commonly known as:  PERCOCET Take 1 Tab by mouth four (4) times daily as needed for Pain for up to 30 days. Max Daily Amount: 4 Tabs. Indications: Pain Start taking on:  11/29/2017 * oxyCODONE-acetaminophen  mg per tablet Commonly known as:  PERCOCET Take 1 Tab by mouth four (4) times daily as needed for Pain for up to 30 days. Max Daily Amount: 4 Tabs. Indications: Pain Start taking on:  12/28/2017 * oxyCODONE-acetaminophen  mg per tablet Commonly known as:  PERCOCET Take 1 Tab by mouth four (4) times daily as needed for Pain for up to 30 days. Max Daily Amount: 4 Tabs. Indications: Pain Start taking on:  2018 SYNTHROID 175 mcg tablet Generic drug:  levothyroxine Take 200 mcg by mouth Daily (before breakfast). TENS Units Kaylie Venu Commonly known as:  TENS 502  
1 Units by Does Not Apply route as directed. Transparent Dressings 3 1/2 X 4 \" Bndg Commonly known as:  TEGADERM  
2 Patches by Apply Externally route every fourty-eight (48) hours. Dispense two boxes XANAX 0.5 mg tablet Generic drug:  ALPRAZolam  
Take 1 mg by mouth three (3) times daily as needed. Take 1- 1/2 tabs tid prn * Notice: This list has 16 medication(s) that are the same as other medications prescribed for you. Read the directions carefully, and ask your doctor or other care provider to review them with you. Prescriptions Printed Refills  
 fentaNYL (DURAGESIC) 100 mcg/hr PATCH 0 Starting on: 2017 Si Patch by TransDERmal route every fourty-eight (48) hours for 30 days. Max Daily Amount: 1 Patch. for chronic, severe, refractory pain. [employer] brands only  Indications: Chronic Pain with Opioid Tolerance, SEVERE PAIN WITH OPIOID TOLERANCE Class: Print Route: TransDERmal  
 fentaNYL (DURAGESIC) 100 mcg/hr PATCH 0 Starting on: 2017 Si Patch by TransDERmal route every fourty-eight (48) hours for 30 days. Max Daily Amount: 1 Patch. for chronic, severe, refractory pain. [employer] brands only  Indications: Chronic Pain with Opioid Tolerance, SEVERE PAIN WITH OPIOID TOLERANCE Class: Print Route: TransDERmal  
 fentaNYL (DURAGESIC) 100 mcg/hr PATCH 0 Starting on: 2018 Si Patch by TransDERmal route every fourty-eight (48) hours for 30 days. Max Daily Amount: 1 Patch. for chronic, severe, refractory pain. [employer] brands only  Indications: Chronic Pain with Opioid Tolerance, SEVERE PAIN WITH OPIOID TOLERANCE Class: Print Route: TransDERmal  
 fentaNYL (DURAGESIC) 25 mcg/hr PATCH 0 Starting on: 2017 Si Patch by TransDERmal route every fourty-eight (48) hours for 30 days. Max Daily Amount: 1 Patch. for chronic, severe, refractory pain. [employer] brands only  Indications: Chronic Pain with Opioid Tolerance, SEVERE PAIN WITH OPIOID TOLERANCE Class: Print Route: TransDERmal  
 fentaNYL (DURAGESIC) 25 mcg/hr PATCH 0 Starting on: 2017 Si Patch by TransDERmal route every fourty-eight (48) hours for 30 days. Max Daily Amount: 1 Patch. for chronic, severe, refractory pain. [employer] brands only  Indications: Chronic Pain with Opioid Tolerance, SEVERE PAIN WITH OPIOID TOLERANCE Class: Print Route: TransDERmal  
 fentaNYL (DURAGESIC) 25 mcg/hr PATCH 0 Starting on: 2018 Si Patch by TransDERmal route every fourty-eight (48) hours for 30 days. Max Daily Amount: 1 Patch. for chronic, severe, refractory pain. [employer] brands only  Indications: Chronic Pain with Opioid Tolerance, SEVERE PAIN WITH OPIOID TOLERANCE Class: Print Route: TransDERmal  
 oxyCODONE-acetaminophen (PERCOCET)  mg per tablet 0 Starting on: 2017 Sig: Take 1 Tab by mouth four (4) times daily as needed for Pain for up to 30 days. Max Daily Amount: 4 Tabs. Indications: Pain Class: Print Route: Oral  
 oxyCODONE-acetaminophen (PERCOCET)  mg per tablet 0 Starting on: 2017 Sig: Take 1 Tab by mouth four (4) times daily as needed for Pain for up to 30 days. Max Daily Amount: 4 Tabs. Indications: Pain Class: Print Route: Oral  
 oxyCODONE-acetaminophen (PERCOCET)  mg per tablet 0 Starting on: 2018 Sig: Take 1 Tab by mouth four (4) times daily as needed for Pain for up to 30 days. Max Daily Amount: 4 Tabs. Indications: Pain Class: Print Route: Oral  
  
Follow-up Instructions Return in about 3 months (around 2018). Patient Instructions 1. Continue current plan with no evidence of addiction or diversion. Stable on current medication without adverse events. 2. Refill fentanyl 100 mcg patch along with 25 mcg patch to equal 125 mcg every 48 hours. Discussed tapering plan in detail today. Plan to begin tapering next visit if needed. 3. Refill oxycodone 10/325 mg up to 4 times daily as needed. 4. Schedule repeat injection with Dr. Mike Li. This will be discussed with his . 5. Naloxone 4 mg nasal spray for opioid induced respiratory depression emergency only. 6. Discussed risks of addiction, dependency, and opioid induced hyperalgesia. 7. Return to clinic in 3 months. Please call and cancel this appointment along with your pain management agreement if you do decide to transition her care by this time. Introducing [facility] SERVICES! Dear Ailyn Oreilly: Thank you for requesting a [employer] account. Our records indicate that you have previously registered for a [employer] account but its currently inactive. Please call our [employer] support line at [phone]. Additional Information If you have questions, please visit the Frequently Asked Questions section of the [employer] website at https://[employer] "[employer] )/. Remember, [employer] is NOT to be used for urgent needs. For medical emergencies, dial 911. Now available from your iPhone and Android! Please provide this summary of care documentation to your next provider. Your primary care clinician is listed as Lisa Retana. If you have any questions after today's visit, please call [phone].

## 2017-11-22 NOTE — PROGRESS NOTES
Nursing Notes    Patient presents to the office today in follow-up. Patient rates her pain at 7/10 on the numerical pain scale. Reviewed medications with counts as follows:    Rx Date filled Qty Dispensed Pill Count Last Dose Short   Fentanyl 100 mcg 10/30/17 15 4 Today left breast no   Fentanyl 25 mcg 10/30/17 15 4 Today right hip no   Percocet  10/30/17 120 35 This a.m no                        POC UDS was performed in office today    Any new labs or imaging since last appointment? NO    Have you been to an emergency room (ER) or urgent care clinic since your last visit? NO            Have you been hospitalized since your last visit? NO     If yes, where, when, and reason for visit? Have you seen or consulted any other health care providers outside of the [address] [place] [address]  since your last visit? YES     If yes, where, when, and reason for visit? HM deferred to pcp.

## 2017-11-22 NOTE — PATIENT INSTRUCTIONS
1. Continue current plan with no evidence of addiction or diversion. Stable on current medication without adverse events. 2. Refill fentanyl 100 mcg patch along with 25 mcg patch to equal 125 mcg every 48 hours. Discussed tapering plan in detail today. Plan to begin tapering next visit if needed. 3. Refill oxycodone 10/325 mg up to 4 times daily as needed. 4. Schedule repeat injection with Dr. Cathay Opitz. This will be discussed with his . 5. Naloxone 4 mg nasal spray for opioid induced respiratory depression emergency only. 6. Discussed risks of addiction, dependency, and opioid induced hyperalgesia. 7. Return to clinic in 3 months. Please call and cancel this appointment along with your pain management agreement if you do decide to transition her care by this time.

## 2018-02-19 ENCOUNTER — OFFICE VISIT (OUTPATIENT)
Dept: PAIN MANAGEMENT | Age: 58
End: 2018-02-19

## 2018-02-19 VITALS
WEIGHT: 235 LBS | BODY MASS INDEX: 32.78 KG/M2 | SYSTOLIC BLOOD PRESSURE: 152 MMHG | DIASTOLIC BLOOD PRESSURE: 82 MMHG | TEMPERATURE: 98.5 F | HEART RATE: 69 BPM | RESPIRATION RATE: 16 BRPM

## 2018-02-19 DIAGNOSIS — G47.00 INSOMNIA, UNSPECIFIED TYPE: ICD-10-CM

## 2018-02-19 DIAGNOSIS — M48.02 SPINAL STENOSIS IN CERVICAL REGION: ICD-10-CM

## 2018-02-19 DIAGNOSIS — M54.12 CERVICAL RADICULOPATHY: ICD-10-CM

## 2018-02-19 DIAGNOSIS — M54.2 CERVICALGIA: Primary | ICD-10-CM

## 2018-02-19 RX ORDER — FENTANYL 12.5 UG/1
1 PATCH TRANSDERMAL
Qty: 15 PATCH | Refills: 0 | Status: SHIPPED | OUTPATIENT
Start: 2018-04-24 | End: 2019-02-01 | Stop reason: DRUGHIGH

## 2018-02-19 RX ORDER — FENTANYL 100 UG/H
1 PATCH TRANSDERMAL
Qty: 15 PATCH | Refills: 0 | Status: SHIPPED | OUTPATIENT
Start: 2018-04-24 | End: 2018-05-21 | Stop reason: SDUPTHER

## 2018-02-19 RX ORDER — FENTANYL 100 UG/H
1 PATCH TRANSDERMAL
Qty: 15 PATCH | Refills: 0 | Status: SHIPPED | OUTPATIENT
Start: 2018-03-25 | End: 2018-05-21

## 2018-02-19 RX ORDER — OXYCODONE AND ACETAMINOPHEN 10; 325 MG/1; MG/1
1 TABLET ORAL
Qty: 120 TAB | Refills: 0 | Status: SHIPPED | OUTPATIENT
Start: 2018-03-25 | End: 2018-05-21

## 2018-02-19 RX ORDER — OXYCODONE AND ACETAMINOPHEN 10; 325 MG/1; MG/1
1 TABLET ORAL
Qty: 120 TAB | Refills: 0 | Status: SHIPPED | OUTPATIENT
Start: 2018-02-24 | End: 2018-05-21

## 2018-02-19 RX ORDER — FENTANYL 12.5 UG/1
1 PATCH TRANSDERMAL
Qty: 15 PATCH | Refills: 0 | Status: SHIPPED | OUTPATIENT
Start: 2018-02-24 | End: 2018-05-21

## 2018-02-19 RX ORDER — OXYCODONE AND ACETAMINOPHEN 10; 325 MG/1; MG/1
1 TABLET ORAL
Qty: 120 TAB | Refills: 0 | Status: SHIPPED | OUTPATIENT
Start: 2018-04-24 | End: 2018-05-21 | Stop reason: SDUPTHER

## 2018-02-19 RX ORDER — ESZOPICLONE 2 MG/1
2 TABLET, FILM COATED ORAL
Qty: 30 TAB | Refills: 2 | Status: SHIPPED | OUTPATIENT
Start: 2018-02-19 | End: 2018-03-21

## 2018-02-19 RX ORDER — FENTANYL 100 UG/H
1 PATCH TRANSDERMAL
Qty: 15 PATCH | Refills: 0 | Status: SHIPPED | OUTPATIENT
Start: 2018-02-24 | End: 2018-05-21

## 2018-02-19 RX ORDER — FENTANYL 12.5 UG/1
1 PATCH TRANSDERMAL
Qty: 15 PATCH | Refills: 0 | Status: SHIPPED | OUTPATIENT
Start: 2018-03-25 | End: 2018-05-21

## 2018-02-19 NOTE — MR AVS SNAPSHOT
[address] [address] 
[phone] Patient: Lynden Lundborg MRN: [id number] RMP:6/20/1457 Visit Information Date & Time Provider Department Dept. Phone Encounter #  
 [facility]19/2018 12:[id number] Follow-up Instructions Return in about 3 months (around 5/19/2018). Upcoming Health Maintenance Date Due Hepatitis C Screening 1960 DTaP/Tdap/Td series (1 - Tdap) 6/29/1980 PAP AKA CERVICAL CYTOLOGY 6/29/1980 BREAST CANCER SCRN MAMMOGRAM 6/29/2009 FOBT Q 1 YEAR AGE 50-75 6/29/2009 Influenza Age 5 to Adult 8/1/2017 Allergies as of 2/19/2018  Review Complete On: 2/19/2018 By: LETICIA Martinez Severity Noted Reaction Type Reactions Oxycontin [Oxycodone]   Intolerance Not Reported This Time Pt states not allergic to oxycontin Current Immunizations  Never Reviewed No immunizations on file. Not reviewed this visit You Were Diagnosed With   
  
 Codes Comments Cervicalgia    -  Primary ICD-10-CM: M54.2 ICD-9-CM: 723.1 Cervical radiculopathy     ICD-10-CM: M54.12 
ICD-9-CM: 723.4 Spinal stenosis in cervical region     ICD-10-CM: M48.02 
ICD-9-CM: 723.0 Insomnia, unspecified type     ICD-10-CM: G47.00 ICD-9-CM: 780.52 Vitals BP Pulse Temp Resp Weight(growth percentile) BMI  
 152/82 (BP 1 Location: Right arm, BP Patient Position: Sitting) 69 98.5 °F (36.9 °C) 16 235 lb (106.6 kg) 32.78 kg/m2 OB Status Smoking Status Hysterectomy Never Smoker BMI and BSA Data Body Mass Index Body Surface Area 32.78 kg/m 2 2.31 m 2 Preferred Pharmacy Pharmacy Name Phone [facility] - [facility] [phone] Your Updated Medication List  
  
   
 This list is accurate as of: 2/19/18  1:30 PM.  Always use your most recent med list.  
  
  
  
  
 CRESTOR 10 mg tablet Generic drug:  rosuvastatin Take 10 mg by mouth nightly.  
  
 ergocalciferol 50,000 unit capsule Commonly known as:  ERGOCALCIFEROL Take 1 Cap by mouth as directed. Once  a week  
  
 eszopiclone 2 mg tablet Commonly known as:  Kayy Alex Take 1 Tab by mouth nightly for 30 days. Max Daily Amount: 2 mg. Indications: Insomnia * fentaNYL 100 mcg/hr PATCH Commonly known as:  DURAGESIC  
1 Patch by TransDERmal route every fourty-eight (48) hours. Max Daily Amount: 1 Patch. for chronic, severe, refractory pain. Naye Robertson brands only * fentaNYL 25 mcg/hr PATCH Commonly known as:  DURAGESIC  
1 Patch by TransDERmal route every fourty-eight (48) hours for 30 days. Max Daily Amount: 1 Patch. for chronic, severe, refractory pain. Valle brands only  Indications: CHRONIC PAIN WITH OPIOID TOLERANCE, SEVERE PAIN WITH OPIOID TOLERANCE  
  
 * fentaNYL 25 mcg/hr PATCH Commonly known as:  DURAGESIC  
1 Patch by TransDERmal route every fourty-eight (48) hours for 30 days. Max Daily Amount: 1 Patch. for chronic, severe, refractory pain. Vlale brands only  Indications: CHRONIC PAIN WITH OPIOID TOLERANCE, SEVERE PAIN WITH OPIOID TOLERANCE  
  
 * fentaNYL 100 mcg/hr PATCH Commonly known as:  DURAGESIC  
1 Patch by TransDERmal route every fourty-eight (48) hours for 30 days. Max Daily Amount: 1 Patch. for chronic, severe, refractory pain. Valle brands only  Indications: CHRONIC PAIN WITH OPIOID TOLERANCE, SEVERE PAIN WITH OPIOID TOLERANCE  
  
 * fentaNYL 100 mcg/hr PATCH Commonly known as:  DURAGESIC  
1 Patch by TransDERmal route every fourty-eight (48) hours for 30 days. Max Daily Amount: 1 Patch. for chronic, severe, refractory pain.  Valle brands only  Indications: Chronic Pain with Opioid Tolerance, SEVERE PAIN WITH OPIOID TOLERANCE  
  
 * fentaNYL 25 mcg/hr PATCH  
 Commonly known as:  DURAGESIC  
1 Patch by TransDERmal route every fourty-eight (48) hours for 30 days. Max Daily Amount: 1 Patch. for chronic, severe, refractory pain. Valle brands only  Indications: Chronic Pain with Opioid Tolerance, SEVERE PAIN WITH OPIOID TOLERANCE  
  
 * fentaNYL 25 mcg/hr PATCH Commonly known as:  DURAGESIC  
1 Patch by TransDERmal route every fourty-eight (48) hours for 30 days. Max Daily Amount: 1 Patch. for chronic, severe, refractory pain. Valle brands only  Indications: Chronic Pain with Opioid Tolerance, SEVERE PAIN WITH OPIOID TOLERANCE  
  
 * fentaNYL 25 mcg/hr PATCH Commonly known as:  DURAGESIC  
1 Patch by TransDERmal route every fourty-eight (48) hours for 30 days. Max Daily Amount: 1 Patch. for chronic, severe, refractory pain. Valle brands only  Indications: Chronic Pain with Opioid Tolerance, SEVERE PAIN WITH OPIOID TOLERANCE  
  
 * fentaNYL 100 mcg/hr PATCH Commonly known as:  DURAGESIC  
1 Patch by TransDERmal route every fourty-eight (48) hours for 30 days. Max Daily Amount: 1 Patch. Apply along with 12 mcg patch to equal 112 mcg for chronic, severe, refractory pain. Valle brands only  Indications: Chronic Pain with Opioid Tolerance, SEVERE PAIN WITH OPIOID TOLERANCE Start taking on:  2/24/2018 * fentaNYL 12 mcg/hr patch Commonly known as:  DURAGESIC  
1 Patch by TransDERmal route every seventy-two (72) hours for 30 days. Max Daily Amount: 1 Patch. Apply along with 100 mcg patch for chronic, severe, refractory pain. Metta Maxon brands only Start taking on:  2/24/2018 * fentaNYL 100 mcg/hr PATCH Commonly known as:  DURAGESIC  
1 Patch by TransDERmal route every fourty-eight (48) hours for 30 days. Max Daily Amount: 1 Patch. Apply along with 12 mcg patch to equal 112 mcg for chronic, severe, refractory pain. Valle brands only  Indications: Chronic Pain with Opioid Tolerance, SEVERE PAIN WITH OPIOID TOLERANCE Start taking on:  3/25/2018 * fentaNYL 12 mcg/hr patch Commonly known as:  DURAGESIC  
1 Patch by TransDERmal route every fourty-eight (48) hours for 30 days. Max Daily Amount: 1 Patch. Apply along with 100 mcg patch for chronic, severe, refractory pain. Tom Pesa brands only Start taking on:  3/25/2018 * fentaNYL 100 mcg/hr PATCH Commonly known as:  DURAGESIC  
1 Patch by TransDERmal route every fourty-eight (48) hours for 30 days. Max Daily Amount: 1 Patch. Apply along with 12 mcg patch to equal 112 mcg for chronic, severe, refractory pain. Valle brands only  Indications: Chronic Pain with Opioid Tolerance, SEVERE PAIN WITH OPIOID TOLERANCE Start taking on:  4/24/2018 * fentaNYL 12 mcg/hr patch Commonly known as:  DURAGESIC  
1 Patch by TransDERmal route every fourty-eight (48) hours for 30 days. Max Daily Amount: 1 Patch. Apply along with 100 mcg patch for chronic, severe, refractory pain. Tom Pesa brands only Start taking on:  4/24/2018  
  
 lidocaine 5 % Commonly known as:  LIDODERM  
1 Patch by TransDERmal route every twenty-four (24) hours. Apply patch to the affected area for 12 hours a day and remove for 12 hours a day.  
  
 naloxegol 25 mg Tab tablet Commonly known as:  Jack Alma Take 1 Tab by mouth daily. Take daily before breakfast for opioid induced constipation  Indications: OPIOID-INDUCED CONSTIPATION  
  
 naloxone 4 mg/actuation nasal spray Commonly known as:  NARCAN  
4 mg by Nasal route once as needed (opioid overdose) for up to 1 dose. May substitute 2 mg syringe if insurance requires  
  
 naproxen 500 mg tablet Commonly known as:  NAPROSYN Take 500 mg by mouth daily (with breakfast). NORVASC 10 mg tablet Generic drug:  amLODIPine Take  by mouth daily. * oxyCODONE-acetaminophen  mg per tablet Commonly known as:  PERCOCET Take 1 Tab by mouth four (4) times daily as needed for Pain for up to 30 days. Max Daily Amount: 4 Tabs.  Indications: PAIN  
  
 * PERCOCET  mg per tablet Generic drug:  oxyCODONE-acetaminophen Take 1 Tab by mouth four (4) times daily as needed for Pain for up to 30 days. Max Daily Amount: 4 Tabs. Indications: PAIN  
  
 * oxyCODONE-acetaminophen  mg per tablet Commonly known as:  PERCOCET Take 1 Tab by mouth four (4) times daily as needed for Pain for up to 30 days. Max Daily Amount: 4 Tabs. Indications: Pain Start taking on:  2018 * oxyCODONE-acetaminophen  mg per tablet Commonly known as:  PERCOCET Take 1 Tab by mouth four (4) times daily as needed for Pain for up to 30 days. Max Daily Amount: 4 Tabs. Indications: Pain Start taking on:  3/25/2018 * oxyCODONE-acetaminophen  mg per tablet Commonly known as:  PERCOCET Take 1 Tab by mouth four (4) times daily as needed for Pain for up to 30 days. Max Daily Amount: 4 Tabs. Indications: Pain Start taking on:  2018 SYNTHROID 175 mcg tablet Generic drug:  levothyroxine Take 200 mcg by mouth Daily (before breakfast). TENS Units Ana Starling Commonly known as:  TENS 502  
1 Units by Does Not Apply route as directed. Transparent Dressings 3 1/2 X 4 \" Bndg Commonly known as:  TEGADERM  
2 Patches by Apply Externally route every fourty-eight (48) hours. Dispense two boxes XANAX 0.5 mg tablet Generic drug:  ALPRAZolam  
Take 1 mg by mouth three (3) times daily as needed. Take 1- 1/2 tabs tid prn * Notice: This list has 19 medication(s) that are the same as other medications prescribed for you. Read the directions carefully, and ask your doctor or other care provider to review them with you. Prescriptions Printed Refills  
 fentaNYL (DURAGESIC) 100 mcg/hr PATCH 0 Starting on: 2018 Si Patch by TransDERmal route every fourty-eight (48) hours for 30 days. Max Daily Amount: 1 Patch.  Apply along with 12 mcg patch to equal 112 mcg for chronic, severe, refractory pain. Valle brands only  Indications: Chronic Pain with Opioid Tolerance, SEVERE PAIN WITH OPIOID TOLERANCE Class: Print Route: TransDERmal  
 fentaNYL (DURAGESIC) 100 mcg/hr PATCH 0 Starting on: 3/25/2018 Si Patch by TransDERmal route every fourty-eight (48) hours for 30 days. Max Daily Amount: 1 Patch. Apply along with 12 mcg patch to equal 112 mcg for chronic, severe, refractory pain. Valle brands only  Indications: Chronic Pain with Opioid Tolerance, SEVERE PAIN WITH OPIOID TOLERANCE Class: Print Route: TransDERmal  
 fentaNYL (DURAGESIC) 100 mcg/hr PATCH 0 Starting on: 2018 Si Patch by TransDERmal route every fourty-eight (48) hours for 30 days. Max Daily Amount: 1 Patch. Apply along with 12 mcg patch to equal 112 mcg for chronic, severe, refractory pain. Valle brands only  Indications: Chronic Pain with Opioid Tolerance, SEVERE PAIN WITH OPIOID TOLERANCE Class: Print Route: TransDERmal  
 oxyCODONE-acetaminophen (PERCOCET)  mg per tablet 0 Starting on: 2018 Sig: Take 1 Tab by mouth four (4) times daily as needed for Pain for up to 30 days. Max Daily Amount: 4 Tabs. Indications: Pain Class: Print Route: Oral  
 oxyCODONE-acetaminophen (PERCOCET)  mg per tablet 0 Starting on: 3/25/2018 Sig: Take 1 Tab by mouth four (4) times daily as needed for Pain for up to 30 days. Max Daily Amount: 4 Tabs. Indications: Pain Class: Print Route: Oral  
 oxyCODONE-acetaminophen (PERCOCET)  mg per tablet 0 Starting on: 2018 Sig: Take 1 Tab by mouth four (4) times daily as needed for Pain for up to 30 days. Max Daily Amount: 4 Tabs. Indications: Pain Class: Print Route: Oral  
 fentaNYL (DURAGESIC) 12 mcg/hr patch 0 Starting on: 2018 Si Patch by TransDERmal route every seventy-two (72) hours for 30 days. Max Daily Amount: 1 Patch.  Apply along with 100 mcg patch for chronic, severe, refractory pain. Sophronia Kemps brands only Class: Print Route: TransDERmal  
 fentaNYL (DURAGESIC) 12 mcg/hr patch 0 Starting on: 3/25/2018 Si Patch by TransDERmal route every fourty-eight (48) hours for 30 days. Max Daily Amount: 1 Patch. Apply along with 100 mcg patch for chronic, severe, refractory pain. Sophronia Kemps brands only Class: Print Route: TransDERmal  
 fentaNYL (DURAGESIC) 12 mcg/hr patch 0 Starting on: 2018 Si Patch by TransDERmal route every fourty-eight (48) hours for 30 days. Max Daily Amount: 1 Patch. Apply along with 100 mcg patch for chronic, severe, refractory pain. Sophronia Kemps brands only Class: Print Route: TransDERmal  
 eszopiclone (LUNESTA) 2 mg tablet 2 Sig: Take 1 Tab by mouth nightly for 30 days. Max Daily Amount: 2 mg. Indications: Insomnia Class: Print Route: Oral  
  
Follow-up Instructions Return in about 3 months (around 2018). Patient Instructions 1. Continue current plan with no evidence of addiction or diversion. Stable on current medication without adverse events. 2. Refill and adjust fentanyl 100 mcg patch. Adjust  25 mcg patch down to 12 mcg to equal 112 mcg every 48 hours. Plan to continue taper next visit. 3. Refill oxycodone 10/325 mg up to 4 times daily as needed. 4. Schedule repeat injection with Dr. Jill Wong. This will be discussed with his . 5. Naloxone 4 mg nasal spray for opioid induced respiratory depression emergency only. 6. Discussed risks of addiction, dependency, and opioid induced hyperalgesia. 7. Return to clinic in 3 months. Please call and cancel this appointment along with your pain management agreement if you do decide to transition her care by this time. Introducing [facility]! Lonnie Manning introduces [employer] patient portal. Now you can access parts of your medical record, email your doctor's office, and request medication refills online. 1. In your internet browser, go to [email or web address] 2. Click on the First Time User? Click Here link in the Sign In box. You will see the New Member Sign Up page. 3. Enter your [employer] Access Code exactly as it appears below. You will not need to use this code after youve completed the sign-up process. If you do not sign up before the expiration date, you must request a new code. · [employer] Access Code: [email or web address] Expires: 5/20/2018  1:25 PM 
 
4. Enter the last four digits of your Social Security Number (xxxx) and Date of Birth (mm/dd/yyyy) as indicated and click Submit. You will be taken to the next sign-up page. 5. Create a Grow[employer] ID. This will be your [employer] login ID and cannot be changed, so think of one that is secure and easy to remember. 6. Create a [employer] password. You can change your password at any time. 7. Enter your Password Reset Question and Answer. This can be used at a later time if you forget your password. 8. Enter your e-mail address. You will receive e-mail notification when new information is available in [address] 9. Click Sign Up. You can now view and download portions of your medical record. 10. Click the Download Summary menu link to download a portable copy of your medical information. If you have questions, please visit the Frequently Asked Questions section of the [employer] website. Remember, [employer] is NOT to be used for urgent needs. For medical emergencies, dial 911. Now available from your iPhone and Android! Please provide this summary of care documentation to your next provider. Your primary care clinician is listed as Ashish Granger. If you have any questions after today's visit, please call [phone].

## 2018-02-19 NOTE — PROGRESS NOTES
Nursing Notes    Patient presents to the office today in follow-up. Patient rates her pain at 5/10 on the numerical pain scale. Reviewed medications with counts as follows:    Rx Date filled Qty Dispensed Pill Count Last Dose Short   Fentanyl 25 mcg 01/25/18 15 3+1  This am  no   Fentanyl 100 mcg 01/25/18 15 3+1  This am  no   Percocet 10 mg 01/25/18 120 31 This am  no         Comments: Patient is here today for a follow up appt today she states her pain level today is a 5  She states labs were taken at her PCM     POC UDS was not performed in office today    Any new labs or imaging since last appointment? YES labs taken at [facility]     Have you been to an emergency room (ER) or urgent care clinic since your last visit? NO            Have you been hospitalized since your last visit? NO     If yes, where, when, and reason for visit? Have you seen or consulted any other health care providers outside of the [address] [place] [address]  since your last visit? YES PCM     If yes, where, when, and reason for visit? HM deferred to pcp.

## 2018-02-19 NOTE — PATIENT INSTRUCTIONS
1. Continue current plan with no evidence of addiction or diversion. Stable on current medication without adverse events. 2. Refill and adjust fentanyl 100 mcg patch. Adjust  25 mcg patch down to 12 mcg to equal 112 mcg every 48 hours. Plan to continue taper next visit. 3. Refill oxycodone 10/325 mg up to 4 times daily as needed. 4. Schedule repeat injection with Dr. Candida Figueroa. This will be discussed with his . 5. Naloxone 4 mg nasal spray for opioid induced respiratory depression emergency only. 6. Discussed risks of addiction, dependency, and opioid induced hyperalgesia. 7. Return to clinic in 3 months. Please call and cancel this appointment along with your pain management agreement if you do decide to transition her care by this time.
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
## 2018-04-10 RX ORDER — SODIUM CHLORIDE 0.9 % (FLUSH) 0.9 %
5-10 SYRINGE (ML) INJECTION AS NEEDED
Status: CANCELLED | OUTPATIENT
Start: 2018-04-11

## 2018-04-10 RX ORDER — MIDAZOLAM HYDROCHLORIDE 1 MG/ML
.5-6 INJECTION, SOLUTION INTRAMUSCULAR; INTRAVENOUS
Status: CANCELLED | OUTPATIENT
Start: 2018-04-11

## 2018-04-11 ENCOUNTER — HOSPITAL ENCOUNTER (OUTPATIENT)
Age: 58
Setting detail: OUTPATIENT SURGERY
Discharge: HOME | End: 2018-04-11
Attending: PHYSICAL MEDICINE & REHABILITATION | Admitting: PHYSICAL MEDICINE & REHABILITATION
Payer: OTHER MISCELLANEOUS

## 2018-04-11 ENCOUNTER — APPOINTMENT (OUTPATIENT)
Dept: GENERAL RADIOLOGY | Age: 58
End: 2018-04-11
Attending: PHYSICAL MEDICINE & REHABILITATION
Payer: OTHER MISCELLANEOUS

## 2018-04-11 VITALS
HEIGHT: 71 IN | RESPIRATION RATE: 14 BRPM | OXYGEN SATURATION: 97 % | SYSTOLIC BLOOD PRESSURE: 148 MMHG | HEART RATE: 56 BPM | TEMPERATURE: 98.1 F | DIASTOLIC BLOOD PRESSURE: 85 MMHG | WEIGHT: 230 LBS | BODY MASS INDEX: 32.2 KG/M2

## 2018-04-11 PROCEDURE — 74011250636 HC RX REV CODE- 250/636

## 2018-04-11 PROCEDURE — 76010000009 HC PAIN MGT 0 TO 30 MIN PROC: Performed by: PHYSICAL MEDICINE & REHABILITATION

## 2018-04-11 PROCEDURE — 74011250636 HC RX REV CODE- 250/636: Performed by: PHYSICAL MEDICINE & REHABILITATION

## 2018-04-11 PROCEDURE — 74011000250 HC RX REV CODE- 250

## 2018-04-11 PROCEDURE — 74011636320 HC RX REV CODE- 636/320

## 2018-04-11 PROCEDURE — 74011636320 HC RX REV CODE- 636/320: Performed by: PHYSICAL MEDICINE & REHABILITATION

## 2018-04-11 RX ORDER — LIDOCAINE HYDROCHLORIDE 10 MG/ML
INJECTION, SOLUTION EPIDURAL; INFILTRATION; INTRACAUDAL; PERINEURAL AS NEEDED
Status: DISCONTINUED | OUTPATIENT
Start: 2018-04-11 | End: 2018-04-11 | Stop reason: HOSPADM

## 2018-04-11 RX ORDER — SODIUM CHLORIDE 0.9 % (FLUSH) 0.9 %
5-10 SYRINGE (ML) INJECTION AS NEEDED
Status: DISCONTINUED | OUTPATIENT
Start: 2018-04-11 | End: 2018-04-11 | Stop reason: HOSPADM

## 2018-04-11 RX ORDER — DEXAMETHASONE SODIUM PHOSPHATE 100 MG/10ML
INJECTION INTRAMUSCULAR; INTRAVENOUS AS NEEDED
Status: DISCONTINUED | OUTPATIENT
Start: 2018-04-11 | End: 2018-04-11 | Stop reason: HOSPADM

## 2018-04-11 RX ORDER — MIDAZOLAM HYDROCHLORIDE 1 MG/ML
.5-6 INJECTION, SOLUTION INTRAMUSCULAR; INTRAVENOUS
Status: DISCONTINUED | OUTPATIENT
Start: 2018-04-11 | End: 2018-04-11 | Stop reason: HOSPADM

## 2018-04-11 RX ORDER — FENTANYL CITRATE 50 UG/ML
INJECTION, SOLUTION INTRAMUSCULAR; INTRAVENOUS AS NEEDED
Status: DISCONTINUED | OUTPATIENT
Start: 2018-04-11 | End: 2018-04-11 | Stop reason: HOSPADM

## 2018-04-11 NOTE — DISCHARGE INSTRUCTIONS
[facility]      Post Procedures Instructions    *Resume Diet and Activity as tolerated. Rest for the remainder of the day. *You may fell worse before you feel better as the numbing medications wear off before the steroids take effect if used for your procedures. *Do not use affected extremity until numbness or loss of sensation has completely resolved without assistance. *DO NOT DRIVE, operate machinery/heavey equipment for 24 hours. *DO NOT DRINK ALCOHOL for 24 hours as it may interact with the sedation if you received it and also thins your blood and may cause you to bleed. *WAIT 24 hours before starting back ANY Blood thinning medications:   (Heparin, Coumadin, Warfarin, Lovenox, Plavix, Aggrenox)    *Resume Pre-Procedure Medications as prescribed except Blood Thinners unless directed by your Physician or Cardiologist.     *Avoid Hot tubs and Heating pad for 24 hours to prevent dissipation of medications, you may shower to remove bandages and remaining prep residue on the skin. * If you develop a Headache, drink plenty of fluids including beverages with caffeine (Coffee, Mt. Dew etc.) and rest.  If the headache persists longer than 24 hoursor intensifies - Please call [facility] ([facility]) [phone]      * If you are DIABETIC, check your blood sugar three times a day for the next three days, the steroids will increase your blood sugar. If your blood sugar is greater than 400 have someone drive you to the nearest [address]. * If you experience any of the following problems, call the [facility] [phone] between 8:00 am - 4:30pm or After Hours [phone].     Shortness of breath    Fever of 101 F or higher    Nausea / Vomiting (not normal to you)    Increasing stiffness in the neck    Weakness or numbness in the arms or legs that is not resolving    Prolonged and increasing pain > than 4 days    ANYTHING OUT of the ORDINARY TO YOU    If YOU are experiencing a severe reaction / complication that you have never had before post procedure, call 911 or go to the nearest emergency room! All patients must have a  for transportation [place] regardless if you do or do not receive sedation. DISCHARGE SUMMARY from Nurse      PATIENT INSTRUCTIONS:    After Oral  or intravenous sedation, for 24 hours or while taking prescription Narcotics:  · Limit your activities  · Do not drive and operate hazardous machinery  · Do not make important personal or business decisions  · Do  not drink alcoholic beverages  · If you have not urinated within 8 hours after discharge, please contact your surgeon on call. Report the following to your surgeon:  · Excessive pain, swelling, redness or odor of or around the surgical area  · Temperature over 101  · Nausea and vomiting lasting longer than 4 hours or if unable to take medications  · Any signs of decreased circulation or nerve impairment to extremity: change in color, persistent  numbness, tingling, coldness or increase pain  · Any questions        What to do at Home:  Recommended activity: Activity as tolerated, NO DRIVING FOR 24 Hours post injection          *  Please give a list of your current medications to your Primary Care Provider. *  Please update this list whenever your medications are discontinued, doses are      changed, or new medications (including over-the-counter products) are added. *  Please carry medication information at all times in case of emergency situations. These are general instructions for a healthy lifestyle:    No smoking/ No tobacco products/ Avoid exposure to second hand smoke    Surgeon General's Warning:  Quitting smoking now greatly reduces serious risk to your health.     Obesity, smoking, and sedentary lifestyle greatly increases your risk for illness    A healthy diet, regular physical exercise & weight monitoring are important for maintaining a healthy lifestyle    You may be retaining fluid if you have a history of heart failure or if you experience any of the following symptoms:  Weight gain of 3 pounds or more overnight or 5 pounds in a week, increased swelling in our hands or feet or shortness of breath while lying flat in bed. Please call your doctor as soon as you notice any of these symptoms; do not wait until your next office visit. Recognize signs and symptoms of STROKE:    F-face looks uneven    A-arms unable to move or move unevenly    S-speech slurred or non-existent    T-time-call 911 as soon as signs and symptoms begin-DO NOT go       Back to bed or wait to see if you get better-TIME IS BRAIN. [employer] Activation    Thank you for requesting access to [employer]. Please follow the instructions below to securely access and download your online medical record. [employer] allows you to send messages to your doctor, view your test results, renew your prescriptions, schedule appointments, and more. How Do I Sign Up? 1. In your internet browser, go to [email or web address]  2. Click on the First Time User? Click Here link in the Sign In box. You will be redirect to the New Member Sign Up page. 3. Enter your [employer] Access Code exactly as it appears below. You will not need to use this code after youve completed the sign-up process. If you do not sign up before the expiration date, you must request a new code. [employer] Access Code: [email or web address]  Expires: 2018  2:25 PM (This is the date your [employer] access code will )    4. Enter the last four digits of your Social Security Number (xxxx) and Date of Birth (mm/dd/yyyy) as indicated and click Submit. You will be taken to the next sign-up page. 5. Create a [employer] ID. This will be your [employer] login ID and cannot be changed, so think of one that is secure and easy to remember. 6. Create a [employer] password.  You can change your password at any time. 7. Enter your Password Reset Question and Answer. This can be used at a later time if you forget your password. 8. Enter your e-mail address. You will receive e-mail notification when new information is available in [address] 9. Click Sign Up. You can now view and download portions of your medical record. 10. Click the Download Summary menu link to download a portable copy of your medical information. Additional Information    If you have questions, please visit the Frequently Asked Questions section of the [employer] website at [email or web address] Remember, [employer] is NOT to be used for urgent needs. For medical emergencies, dial 911.

## 2018-04-11 NOTE — H&P
11 Apr 2018, 10:25AM. Patient seen and examined prior to procedure. Chart and data reviewed. No significant interval changes from previous evaluation noted. Stable for procedure as intended and discussed. [facility]  Brief Pre-Procedure History & Physical    PATIENT NAME:  Timmy Michele   YOB: 1960  DATE OF SERVICE:  4/11/2018      CHIEF COMPLAINT:  Pain    HISTORY OF PRESENT ILLNESS:  Timmy Michele presents today for a previously diagnosed problem contributing to some or all of this patients pain. The location and pattern of the pain has not changed substantially since the last visit in our office. No other significant medical changes have occurred in the last 30 days. PAST MEDICAL HISTORY:  The patient  has a past medical history of Brachial neuritis or radiculitis NOS; Calculus of kidney; Cervicalgia; Degeneration of cervical intervertebral disc; GERD (gastroesophageal reflux disease); Hypertension; Ill-defined condition; Pancreatitis (2012); and Thyroid cancer ([facility] [address] PAST SURGICAL HISTORY:  The patient  has a past surgical history that includes hx hysterectomy; hx oophorectomy; hx breast biopsy; hx lithotripsy; hx thyroidectomy; and hx cholecystectomy. CURRENT MEDICATIONS:  See Medication Administration Record [facility]) in the patient's electronic record. ALLERGIES:    Allergies   Allergen Reactions    Oxycontin [Oxycodone] Not Reported This Time     Pt states not allergic to oxycontin         FAMILY HISTORY:  The patient family history includes Breast Cancer in her mother; Diabetes in her brother, mother, and sister; Heart Attack in an other family member. SOCIAL HISTORY:  The patient  reports that she has never smoked. She has never used smokeless tobacco. The patient  reports that she does not drink alcohol. She  reports that she does not use illicit drugs.      REVIEW OF SYSTEMS:  Timmy Michele denies any fever, chills, unexplained weight loss, use of antibiotics for recent infection or bleeding abnormalities. PHYSICAL EXAM:  VS:   Visit Vitals    /86 (BP 1 Location: Right arm, BP Patient Position: Sitting)    Pulse (!) 54    Temp 98.1 °F (36.7 °C)    Resp 18    Ht 5' 11\" (1.803 m)    Wt 104.3 kg (230 lb)    SpO2 94%    BMI 32.08 kg/m2     Gen: Well-developed. Body habitus consistent with recorded height and weight and the calculated BMI. No apparent distress. Head: Normocephalic, atraumatic. Skin: No obvious rashes, lesions or infection. Pulm: Respirations are even and unlabored. Psych:    Mood, affect and speech  Appropriate. ASSESSMENT:   1. Stable for IL MARILOU C7-T1 interventional pain procedure as discussed. PLAN:  Proceed with scheduled procedure.      Olive Gaona MD 4/11/2018 10:27 AM

## 2018-04-11 NOTE — PROCEDURES
[facility]    INTERLAMINAR CERVICAL EPIDURAL STEROID INJECTION  PROCEDURE REPORT      PATIENT:  Sravani Stafford  YOB: 1960  DATE OF SERVICE:  4/11/2018  [facility]    PRE-PROCEDURE DIAGNOSIS:  See Above    POST-PROCEDURE DIAGNOSIS:  See Above                PROCEDURE:    1. Interlaminar cervical epidural steroid injection, C7-T1  (37240)  2. Fluoroscopic needle guidance (spinal) (46819)  3. Supervision of moderate sedation (42890)    ANESTHESIA:  Local with moderate IV sedation. See Medication Administration Record for specific medications and dosage. COMPLICATIONS: None. PHYSICIAN:  Sanket Hoff MD    PRE-PROCEDURE NOTE:  Pre-procedural assessment of the patient was performed including a limited history and physical examination. The details of the procedure were discussed with the patient, including the risks, benefits and alternative options and an informed consent was obtained. The patients NPO status, if necessary for the specific procedure and/or administration of moderate intravenous sedation, if utilized, and availability of a responsible adult to escort the patient following the procedure were confirmed. A peripheral intravenous cannula was placed without difficulty and lactated Ringers solution administered. See nursing notes for details. PROCEDURE NOTE:  The patient was brought to the procedure suite and positioned on the fluoroscopy table in the prone position. Physiologic monitors were applied and supplemental oxygen was administered via nasal cannula. The skin was prepped in the standard surgical fashion and sterile drapes were applied over the procedure site.  Please refer to the Flowsheet for documentation of the patients vital signs and the Medication Administration Record for any oral and/or intravenous sedation administered prior to or during the procedure. The above-listed interlaminar space was identified and the skin and subcutaneous tissues were infiltrated with 1% Lidocaine. Under anterior-posterior fluoroscopic guidance an 18g, 3.5-inch Tuohy epidural needle was advanced along the previously identified interlaminar space. The fluoroscope was then turned lateral view and a loss of resistance syringe was attached to the needle containing preservative free normal saline. Under lateral flouroscopic guidance, the needle was then advanced through the ligamentum flavum, entering the epidural space with a clear and crisp loss of resistance. The needle was not advanced beyond the interlaminar line at any time during this process. No CSF was noted. Aspiration was negative for blood or CSF. Additional confirmation was made with the injection of 0.5 mL of a nonionic water-soluble radiographic contrast medium (Isovue-M 200) demonstrating appropriate epidural spread and the absence of vascular uptake. Following this, a 3 mL solution comprised of 2 mL of dexamethasone (10mg/ml) and 1 mL of lidocaine 1% preservative free was injected slowly after negative aspiration. The needle was cleared of steroid solution and removed. The area was thoroughly cleaned and sterile bandages applied as necessary. The patient tolerated the procedure well and vital signs remained stable throughout the procedure. POST-PROCEDURE COURSE:  The patient was escorted from the procedure suite in satisfactory condition and recovered per facility protocol based on the type of procedure performed and/or the sedation utilized. The patient did not experience any adverse events and remained hemodynamically stable during the post-procedure period. DISCHARGE NOTE:  Upon discharge, the patient was able to tolerate fluids and was in no acute distress. The patient was oriented to person, place and time and vital signs were stable.  Appropriate post-procedure instructions were provided and explained to the patient in detail and all questions were answered.     Olive Gaona MD 4/11/2018 11:07 AM

## 2018-04-17 ENCOUNTER — TELEPHONE (OUTPATIENT)
Dept: PAIN MANAGEMENT | Age: 58
End: 2018-04-17

## 2018-04-17 NOTE — TELEPHONE ENCOUNTER
Ms. Jason Velasquez was contacted for follow-up status post Interlaminar cervical epidural steroid injection, C7-T1  on April 10, 2018.  She reports:    Pre-procedure numerical pain score: 9/10  Post-procedure numerical pain score immediately after: 3/10  Duration of relief post-procedure (if applicable): 1 weeks  Improvement in functional activities (if applicable): Yes  Percentage of overall improvement: 90%    COMMENTS:

## 2018-04-18 ENCOUNTER — TELEPHONE (OUTPATIENT)
Dept: PAIN MANAGEMENT | Age: 58
End: 2018-04-18

## 2018-04-18 NOTE — TELEPHONE ENCOUNTER
Ms. Bailey Cummings was contacted for follow-up status post Interlaminar cervical epidural steroid injection, C7-T1  on April 11, 2018.  She reports:    Pre-procedure numerical pain score: 9/10  Post-procedure numerical pain score immediately after: 3/10  Duration of relief post-procedure (if applicable): 1 weeks  Improvement in functional activities (if applicable): Yes  Percentage of overall improvement: 90%    COMMENTS:

## 2018-06-20 DIAGNOSIS — M54.2 CERVICALGIA: ICD-10-CM

## 2018-06-20 NOTE — TELEPHONE ENCOUNTER
Amy Bhagat has called requesting a refill of their controlled medication, oxycodone and fentanyl, for the management of chronic pain. Last office visit date: 5/21/18    Date last  was pulled and reviewed : 6/20/18    Was the patient compliant when the above report was pulled? yes    Analgesia: 70% pain relief notes    Aberrancies: none noted    ADL's: pt expresses ability to execute ADL's care    Adverse Reaction: denies any    Provider's last note and plan of care reviewed? yes  Request forwarded to provider for review.     Send to [facility]

## 2018-06-22 ENCOUNTER — TELEPHONE (OUTPATIENT)
Dept: PAIN MANAGEMENT | Age: 58
End: 2018-06-22

## 2018-06-22 RX ORDER — FENTANYL 100 UG/H
1 PATCH TRANSDERMAL
Qty: 15 PATCH | Refills: 0 | Status: SHIPPED | OUTPATIENT
Start: 2018-06-22 | End: 2018-12-19 | Stop reason: DRUGHIGH

## 2018-06-22 RX ORDER — OXYCODONE AND ACETAMINOPHEN 10; 325 MG/1; MG/1
1 TABLET ORAL
Qty: 120 TAB | Refills: 0 | Status: SHIPPED | OUTPATIENT
Start: 2018-06-22 | End: 2018-07-27 | Stop reason: SDUPTHER

## 2018-07-27 ENCOUNTER — OFFICE VISIT (OUTPATIENT)
Dept: PAIN MANAGEMENT | Age: 58
End: 2018-07-27

## 2018-07-27 VITALS
HEIGHT: 71 IN | HEART RATE: 59 BPM | WEIGHT: 224 LBS | SYSTOLIC BLOOD PRESSURE: 180 MMHG | TEMPERATURE: 97.7 F | RESPIRATION RATE: 14 BRPM | DIASTOLIC BLOOD PRESSURE: 86 MMHG | BODY MASS INDEX: 31.36 KG/M2

## 2018-07-27 DIAGNOSIS — G24.3 SPASMODIC TORTICOLLIS: ICD-10-CM

## 2018-07-27 DIAGNOSIS — M54.12 BRACHIAL NEURITIS: ICD-10-CM

## 2018-07-27 DIAGNOSIS — M48.02 SPINAL STENOSIS IN CERVICAL REGION: ICD-10-CM

## 2018-07-27 DIAGNOSIS — M54.12 CERVICAL RADICULOPATHY: ICD-10-CM

## 2018-07-27 DIAGNOSIS — M47.22 OSTEOARTHRITIS OF SPINE WITH RADICULOPATHY, CERVICAL REGION: ICD-10-CM

## 2018-07-27 DIAGNOSIS — M54.2 CERVICALGIA: ICD-10-CM

## 2018-07-27 DIAGNOSIS — G89.4 CHRONIC PAIN SYNDROME: ICD-10-CM

## 2018-07-27 DIAGNOSIS — M54.81 BILATERAL OCCIPITAL NEURALGIA: ICD-10-CM

## 2018-07-27 RX ORDER — OXYCODONE AND ACETAMINOPHEN 10; 325 MG/1; MG/1
1 TABLET ORAL
Qty: 120 TAB | Refills: 0 | Status: SHIPPED | OUTPATIENT
Start: 2018-07-27 | End: 2018-08-21 | Stop reason: SDUPTHER

## 2018-07-27 RX ORDER — FENTANYL 75 UG/H
1 PATCH TRANSDERMAL
Qty: 10 PATCH | Refills: 0 | Status: SHIPPED | OUTPATIENT
Start: 2018-07-27 | End: 2018-11-15 | Stop reason: DRUGHIGH

## 2018-07-27 NOTE — MR AVS SNAPSHOT
[address] [facility] [address] [phone] Patient: Octavio Mercado MRN: [id number] EEO:8/[id number] Visit Information Date & Time Provider Department Dept. Phone Encounter #  
 7/27/2018 10:00 AM Janett Mayo [address] for Pain Management [phone] [id number] Follow-up Instructions Return in about 1 month (around 8/27/2018). Your Appointments 8/15/2018  1:20 PM  
Follow Up with LETICIA Green [address] for Pain Management (KARL SCHEDULING) Appt Note: patient scheduled 3 month followup with [facility] needed before 512806- Ana 2:57pm  
 [address] [facility] [address]  
[phone] [facility] [phone] Upcoming Health Maintenance Date Due Hepatitis C Screening 1960 DTaP/Tdap/Td series (1 - Tdap) 6/29/1980 PAP AKA CERVICAL CYTOLOGY 6/29/1980 BREAST CANCER SCRN MAMMOGRAM 6/29/2009 FOBT Q 1 YEAR AGE 50-75 6/29/2009 Influenza Age 5 to Adult 8/1/2018 Allergies as of 7/27/2018  Review Complete On: 7/27/2018 By: LETICIA Green Severity Noted Reaction Type Reactions Oxycontin [Oxycodone]   Intolerance Not Reported This Time Pt states not allergic to oxycontin Current Immunizations  Never Reviewed No immunizations on file. Not reviewed this visit You Were Diagnosed With   
  
 Codes Comments Cervicalgia     ICD-10-CM: M54.2 ICD-9-CM: 723.1 Brachial neuritis     ICD-10-CM: M54.12 
ICD-9-CM: 723.4 Spinal stenosis in cervical region     ICD-10-CM: M48.02 
ICD-9-CM: 723.0 Bilateral occipital neuralgia     ICD-10-CM: M54.81 ICD-9-CM: 723.8 Spasmodic torticollis     ICD-10-CM: G24.3 ICD-9-CM: 333.83 Osteoarthritis of spine with radiculopathy, cervical region     ICD-10-CM: M47.22 
ICD-9-CM: 721.0 Chronic pain syndrome     ICD-10-CM: G89.4 ICD-9-CM: 338.4 Cervical radiculopathy     ICD-10-CM: M54.12 
ICD-9-CM: 723.4 Vitals BP Pulse Temp Resp Height(growth percentile) Weight(growth percentile) 180/86 (BP 1 Location: Right arm, BP Patient Position: Sitting) (!) 59 97.7 °F (36.5 °C) 14 5' 11\" (1.803 m) 224 lb (101.6 kg) BMI OB Status Smoking Status 31.24 kg/m2 Hysterectomy Never Smoker BMI and BSA Data Body Mass Index Body Surface Area  
 31.24 kg/m 2 2.26 m 2 Preferred Pharmacy Pharmacy Name Phone [facility] [phone] Your Updated Medication List  
  
   
This list is accurate as of 7/27/18 11:24 AM.  Always use your most recent med list.  
  
  
  
  
 CRESTOR 10 mg tablet Generic drug:  rosuvastatin Take 10 mg by mouth nightly.  
  
 ergocalciferol 50,000 unit capsule Commonly known as:  ERGOCALCIFEROL Take 1 Cap by mouth as directed. Once  a week  
  
 ezetimibe 10 mg tablet Commonly known as:  Carolynn Ahumada Take 10 mg by mouth daily. * fentaNYL 12 mcg/hr patch Commonly known as:  DURAGESIC  
1 Patch by TransDERmal route every fourty-eight (48) hours for 30 days. Max Daily Amount: 1 Patch. Apply along with 100 mcg patch for chronic, severe, refractory pain. Julietenda Pardo brands only * fentaNYL 100 mcg/hr PATCH Commonly known as:  DURAGESIC  
1 Patch by TransDERmal route every fourty-eight (48) hours for 30 days. Max Daily Amount: 1 Patch. Apply along with 12 mcg patch to equal 112 mcg for chronic, severe, refractory pain. Valle brands only  Indications: Chronic Pain with Opioid Tolerance, SEVERE PAIN WITH OPIOID TOLERANCE  
  
 * fentaNYL 75 mcg/hr Commonly known as:  DURAGESIC  
1 Patch by TransDERmal route every seventy-two (72) hours for 30 days. Max Daily Amount: 1 Patch.  
  
 lidocaine 5 % Commonly known as:  LIDODERM  
1 Patch by TransDERmal route every twenty-four (24) hours.  Apply patch to the affected area for 12 hours a day and remove for 12 hours a day.  
  
 naloxegol 25 mg Tab tablet Commonly known as:  Rosalie Jessica Take 1 Tab by mouth daily. Take daily before breakfast for opioid induced constipation  Indications: OPIOID-INDUCED CONSTIPATION  
  
 naloxone 4 mg/actuation nasal spray Commonly known as:  NARCAN  
4 mg by Nasal route once as needed (opioid overdose) for up to 1 dose. May substitute 2 mg syringe if insurance requires  
  
 naproxen 500 mg tablet Commonly known as:  NAPROSYN Take 500 mg by mouth daily (with breakfast). NORVASC 10 mg tablet Generic drug:  amLODIPine Take  by mouth daily. oxyCODONE-acetaminophen  mg per tablet Commonly known as:  PERCOCET Take 1 Tab by mouth four (4) times daily as needed for Pain for up to 30 days. Max Daily Amount: 4 Tabs. Indications: Pain SYNTHROID 175 mcg tablet Generic drug:  levothyroxine Take 200 mcg by mouth Daily (before breakfast). TENS Units [facility] Commonly known as:  TENS 502  
1 Units by Does Not Apply route as directed. Transparent Dressings 3 1/2 X 4 \" Bndg Commonly known as:  TEGADERM  
2 Patches by Apply Externally route every fourty-eight (48) hours. Dispense two boxes XANAX 0.5 mg tablet Generic drug:  ALPRAZolam  
Take 1 mg by mouth three (3) times daily as needed. Take 1- 1/2 tabs tid prn * Notice: This list has 3 medication(s) that are the same as other medications prescribed for you. Read the directions carefully, and ask your doctor or other care provider to review them with you. Prescriptions Printed Refills  
 oxyCODONE-acetaminophen (PERCOCET)  mg per tablet 0 Sig: Take 1 Tab by mouth four (4) times daily as needed for Pain for up to 30 days. Max Daily Amount: 4 Tabs. Indications: Pain Class: Print Route: Oral  
 fentaNYL (DURAGESIC) 75 mcg/hr 0  Si Patch by TransDERmal route every seventy-two (72) hours for 30 days. Max Daily Amount: 1 Patch. Class: Print Route: TransDERmal  
  
Follow-up Instructions Return in about 1 month (around 8/27/2018). Patient Instructions 1. Continue current plan with no evidence of addiction or diversion. Stable on current medication without adverse events. 2. Refill fentanyl 100 mcg patch down to 75 mcg every 72hours. 3. Refill oxycodone 10/325 mg up to 4 times daily as needed. 4. Schedule repeat cervical MARILOU with Dr. Dionna Miranda 5. Please discussed Xanax with her psychiatrist as soon as possible as we will no longer be able to prescribe opioid medications while you are concurrently using benzodiazepines. Please do not just stop taking this medication and discussed tapering plan with her psychiatrist 
6. Naloxone 4 mg nasal spray for opioid induced respiratory depression emergency only. 7. Discussed risks of addiction, dependency, and opioid induced hyperalgesia. 8. Return to clinic in 1 month. Please call and cancel this appointment along with your pain management agreement if you do decide to transition her care by this time. Introducing [facility] SERVICES! Jose Kramer introduces [employer] patient portal. Now you can access parts of your medical record, email your doctor's office, and request medication refills online. 1. In your internet browser, go to [email or web address] 2. Click on the First Time User? Click Here link in the Sign In box. You will see the New Member Sign Up page. 3. Enter your [employer] Access Code exactly as it appears below. You will not need to use this code after youve completed the sign-up process. If you do not sign up before the expiration date, you must request a new code. · [employer] Access Code: [email or web address] Expires: 8/19/2018  1:49 PM 
 
4. Enter the last four digits of your Social Security Number (xxxx) and Date of Birth (mm/dd/yyyy) as indicated and click Submit.  You will be taken to the next sign-up page. 5. Create a [employer] ID. This will be your [employer] login ID and cannot be changed, so think of one that is secure and easy to remember. 6. Create a [employer] password. You can change your password at any time. 7. Enter your Password Reset Question and Answer. This can be used at a later time if you forget your password. 8. Enter your e-mail address. You will receive e-mail notification when new information is available in [address]. 9. Click Sign Up. You can now view and download portions of your medical record. 10. Click the Download Summary menu link to download a portable copy of your medical information. If you have questions, please visit the Frequently Asked Questions section of the [employer] website. Remember, [employer] is NOT to be used for urgent needs. For medical emergencies, dial 911. Now available from your iPhone and Android! Please provide this summary of care documentation to your next provider. Your primary care clinician is listed as Matt Blanchard. If you have any questions after today's visit, please call [phone].

## 2018-07-27 NOTE — PATIENT INSTRUCTIONS
1. Continue current plan with no evidence of addiction or diversion. Stable on current medication without adverse events. 2. Refill fentanyl 100 mcg patch down to 75 mcg every 72hours. 3. Refill oxycodone 10/325 mg up to 4 times daily as needed. 4. Schedule repeat cervical MARILOU with Dr. Loida Coker  5. Please discussed Xanax with her psychiatrist as soon as possible as we will no longer be able to prescribe opioid medications while you are concurrently using benzodiazepines. Please do not just stop taking this medication and discussed tapering plan with her psychiatrist  6. Naloxone 4 mg nasal spray for opioid induced respiratory depression emergency only. 7. Discussed risks of addiction, dependency, and opioid induced hyperalgesia. 8. Return to clinic in 1 month. Please call and cancel this appointment along with your pain management agreement if you do decide to transition her care by this time.

## 2018-07-27 NOTE — PROGRESS NOTES
HISTORY OF PRESENT ILLNESS  Eboni Sifuentes is a 60 y.o. female    HPI: Ms. 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 No h/o neck surgery. Good improvement with C7-T1 MARILOU injections by Dr. Daniel Andino most recently 7/18/2017 & 4/11/2018. Ms. Abel iMr reports worsening pain since her last visit. She has been doing well with her current treatment plan which has been offering significant pain control. We have been reducing her medications slowly. We had a lengthy conversation last visit about changes to our practice. Explained to her again today that we are moving to a more conservative plan of care. We will continue adjusting down her fentanyl. Please see tapering plan below. Also explained to her that we will have the follow-up monthly. She is very concerned about these changes and states that she would like to transfer care. We did discuss other options in the office today. She has been receiving cervical epidural injections by Dr. Daniel Andino with good improvement. She reports stiffness and exacerbating pain in her neck area and would like to repeat injections next available. These will be ordered today. I will have her follow-up in 3 months for further evaluation and recommendation. I have asked her to call and cancel her appointment and pain management agreement she does decide to transfer care. Currently she still receives Xanax from her psychiatrist.  We discussed risks associated with opioids and benzodiazepines again in the office today. Explained her that we can no longer prescribe opioids if she concurrently uses benzodiazepine. I have asked her to contact her psychiatrist as soon as possible to discuss alternative treatments. She understands we will had to discontinue her medication next visit if she continues with benzos Xanax.      Medications are helping with pain control and quality of life. Her pain is 4-5/10 with medication and 6-7/10 without. Pt describes pain as throbbing, spasms, and aching. Aggravating factors include cold weather and certain movments. Relieved with rest, medication, and avoiding painful activities. Current treatment is helping to improve general activity, mood, walking, sleep, enjoyment of life    Ms. Micaela Snowden is tolerating medications well, with no side effects noted. She is able to stay more active with less discomfort with these current doses. In the past 30 days, the patient reports an average of 45% pain relief with current treatment/medications. She is informed of side effects, risks, and benefits of this regimen, and emphasizes that she derives a significant improvement in functionality and quality of life, and notes that non-opioid medications and therapies in the past have not offered significant benefit. Pt does report constipation but is well controlled with Movantik  She  is otherwise doing well with no other complaints today. She denies any adverse events including nausea, vomiting, dizziness, increased constipation, hallucinations, or seizures. Because the patient's current regimen places him/her at increased risk for possible overdose, a prescription for naloxone nasal spray has been provided. The patient understands that this medication is only to be used in the setting of a possible overdose and that inadvertent use of this medication could precipitate overt withdrawal.    MME: 300  COMM: 2  Oswestry: Not completed  ORT: 0  PMA updated: 5/21/2018  POC UDS today. Confirmation pending.        Allergies   Allergen Reactions    Oxycontin [Oxycodone] Not Reported This Time     Pt states not allergic to oxycontin         Past Surgical History:   Procedure Laterality Date    HX BREAST BIOPSY      Negative    HX CHOLECYSTECTOMY      HX HYSTERECTOMY      HX LITHOTRIPSY      HX OOPHORECTOMY      HX THYROIDECTOMY      Partial thyroidectomy Review of Systems   Constitutional: Negative for chills, fever and weight loss. HENT: Negative for congestion and sore throat. Eyes: Negative for blurred vision and double vision. Respiratory: Negative for cough, shortness of breath and wheezing. Cardiovascular: Negative for chest pain and palpitations. Gastrointestinal: Negative for abdominal pain, constipation, diarrhea, heartburn, nausea and vomiting. Genitourinary: Negative. Neurological: Negative for dizziness, seizures, loss of consciousness and headaches. Endo/Heme/Allergies: Does not bruise/bleed easily. Psychiatric/Behavioral: Negative for depression. The patient is not nervous/anxious. Physical Exam   Constitutional: She is oriented to person, place, and time and well-developed, well-nourished, and in no distress. No distress. overweight   HENT:   Head: Normocephalic and atraumatic. Eyes: EOM are normal.   Pulmonary/Chest: Effort normal.   Neurological: She is alert and oriented to person, place, and time. Skin: Skin is warm and dry. No rash noted. She is not diaphoretic. No erythema. Psychiatric: Mood, memory, affect and judgment normal.   Nursing note and vitals reviewed. ASSESSMENT:    1. Cervicalgia    2. Brachial neuritis    3. Spinal stenosis in cervical region    4. Bilateral occipital neuralgia    5. Spasmodic torticollis    6. Osteoarthritis of spine with radiculopathy, cervical region    7. Chronic pain syndrome    8. Cervical radiculopathy           [facility] Prescription Monitoring Program was reviewed which does not demonstrate aberrancies and/or inconsistencies with regard to the historical prescribing of controlled medications to this patient by other providers. PLAN / Pt Instructions:  1. Continue current plan with no evidence of addiction or diversion. Stable on current medication without adverse events. 2. Refill fentanyl 100 mcg patch down to 75 mcg every 72hours.     3. Refill oxycodone 10/325 mg up to 4 times daily as needed. 4. Schedule repeat cervical MARILOU with Dr. Roselle Siemens  5. Please discuss Xanax with her psychiatrist as soon as possible as we will no longer be able to prescribe opioid medications while you are concurrently using benzodiazepines. Please do not just stop taking this medication and discussed tapering plan with her psychiatrist  6. Naloxone 4 mg nasal spray for opioid induced respiratory depression emergency only. 7. Discussed risks of addiction, dependency, and opioid induced hyperalgesia. 8. Return to clinic in 1 month. Please call and cancel this appointment along with your pain management agreement if you do decide to transition her care by this time. Medications Ordered Today   Medications    oxyCODONE-acetaminophen (PERCOCET)  mg per tablet     Sig: Take 1 Tab by mouth four (4) times daily as needed for Pain for up to 30 days. Max Daily Amount: 4 Tabs. Indications: Pain     Dispense:  120 Tab     Refill:  0    fentaNYL (DURAGESIC) 75 mcg/hr     Si Patch by TransDERmal route every seventy-two (72) hours for 30 days. Max Daily Amount: 1 Patch. Dispense:  10 Patch     Refill:  0       DISPOSITION     Pain medications are prescribed with the objective of pain relief and improved physical and psychosocial function in this patient.  Pain Meds and Quality Of Life have been reviewed. Nonpharmacologic therapy and non-opioid pharmacologic therapy have been considered. Opioid therapy is only prescribed if the expected benefits are anticipated to outweigh risks.  Counseled patient on proper use of prescribed medications.  Counseled patient about chronic medical conditions and their relationship to anxiety and depression and recommended mental health support as needed.  Reviewed with patient self-help tools, home exercise, and lifestyle changes to assist the patient in self-management of symptoms.    Reviewed with patient the treatment plan, goals of treatment plan, and limitations of treatment plan, to include the potential for side effects from medications and procedures. If side effects occur, it is the responsibility of the patient to inform the clinic so that a change in the treatment plan can be made in a safe manner. The patient is advised that stopping prescribed medication may cause an increase in symptoms and possible medication withdrawal symptoms. The patient is informed an emergency room evaluation may be necessary if this occurs. Spent 25 minutes with patient today which more than 50% of that time was spent on counseling and coordination of care. Sabine Helms, [address] 7/27/2018        Note: Please excuse any typographical errors. Voice recognition software was used for this note and may cause mistakes.

## 2018-07-27 NOTE — PROGRESS NOTES
Nursing Notes    Patient presents to the office today in follow-up. Patient rates her pain at 8/10 on the numerical pain scale. Reviewed medications with counts as follows:    Rx Date filled Qty Dispensed Pill Count Last Dose Short   Percocet 10 mg 06/25/18 120 0 yesterday no   Fentanyl 100 mcg 06/25/18 15 1 on 07/25/18 no         Comments: Patient is here today for a follow up appt today she states her pain level today is an 8  PHQ 9 was done patient denies any depression, she states she ran out of her meds. Patient states she cut her patch in half she states she is out she also has IWP    POC UDS was not performed in office today    Any new labs or imaging since last appointment? NO    Have you been to an emergency room (ER) or urgent care clinic since your last visit? NO            Have you been hospitalized since your last visit? NO     If yes, where, when, and reason for visit? Have you seen or consulted any other health care providers outside of the [address] [place] [address]  since your last visit? NO     If yes, where, when, and reason for visit? Ms. Magalie Thomas has a reminder for a \"due or due soon\" health maintenance. I have asked that she contact her primary care provider for follow-up on this health maintenance.

## 2018-08-21 ENCOUNTER — OFFICE VISIT (OUTPATIENT)
Dept: PAIN MANAGEMENT | Age: 58
End: 2018-08-21

## 2018-08-21 VITALS
TEMPERATURE: 98.7 F | DIASTOLIC BLOOD PRESSURE: 98 MMHG | HEART RATE: 54 BPM | HEIGHT: 71 IN | SYSTOLIC BLOOD PRESSURE: 185 MMHG | WEIGHT: 224 LBS | RESPIRATION RATE: 14 BRPM | BODY MASS INDEX: 31.36 KG/M2

## 2018-08-21 DIAGNOSIS — M48.02 SPINAL STENOSIS IN CERVICAL REGION: ICD-10-CM

## 2018-08-21 DIAGNOSIS — M54.2 CERVICALGIA: ICD-10-CM

## 2018-08-21 DIAGNOSIS — G89.4 CHRONIC PAIN SYNDROME: ICD-10-CM

## 2018-08-21 DIAGNOSIS — G24.3 SPASMODIC TORTICOLLIS: ICD-10-CM

## 2018-08-21 DIAGNOSIS — M54.12 BRACHIAL NEURITIS: ICD-10-CM

## 2018-08-21 DIAGNOSIS — M54.81 BILATERAL OCCIPITAL NEURALGIA: ICD-10-CM

## 2018-08-21 DIAGNOSIS — M54.12 CERVICAL RADICULOPATHY: ICD-10-CM

## 2018-08-21 DIAGNOSIS — M47.22 OSTEOARTHRITIS OF SPINE WITH RADICULOPATHY, CERVICAL REGION: ICD-10-CM

## 2018-08-21 RX ORDER — OXYCODONE AND ACETAMINOPHEN 10; 325 MG/1; MG/1
1 TABLET ORAL
Qty: 120 TAB | Refills: 0 | Status: SHIPPED | OUTPATIENT
Start: 2018-08-26 | End: 2018-11-15 | Stop reason: CLARIF

## 2018-08-21 RX ORDER — FENTANYL 75 UG/H
1 PATCH TRANSDERMAL
Qty: 10 PATCH | Refills: 0 | Status: SHIPPED | OUTPATIENT
Start: 2018-08-26 | End: 2018-11-15 | Stop reason: DRUGHIGH

## 2018-08-21 RX ORDER — FENTANYL 37.5 UG/H
1 PATCH, EXTENDED RELEASE TRANSDERMAL
Qty: 10 PATCH | Refills: 0 | Status: SHIPPED | OUTPATIENT
Start: 2018-10-24 | End: 2018-11-15 | Stop reason: DRUGHIGH

## 2018-08-21 RX ORDER — OXYCODONE AND ACETAMINOPHEN 10; 325 MG/1; MG/1
1 TABLET ORAL
Qty: 120 TAB | Refills: 0 | Status: SHIPPED | OUTPATIENT
Start: 2018-10-24 | End: 2018-11-15 | Stop reason: CLARIF

## 2018-08-21 RX ORDER — OXYCODONE AND ACETAMINOPHEN 10; 325 MG/1; MG/1
1 TABLET ORAL
Qty: 120 TAB | Refills: 0 | Status: SHIPPED | OUTPATIENT
Start: 2018-09-25 | End: 2018-11-15 | Stop reason: CLARIF

## 2018-08-21 RX ORDER — FENTANYL 50 UG/1
1 PATCH TRANSDERMAL
Qty: 10 PATCH | Refills: 0 | Status: SHIPPED | OUTPATIENT
Start: 2018-09-25 | End: 2018-11-15 | Stop reason: DRUGHIGH

## 2018-08-21 NOTE — PROGRESS NOTES
HISTORY OF PRESENT ILLNESS  Elin Schumacher is a 60 y.o. female    HPI: Ms. Lino Palomino  returns today for f/u of chronic neck pain due to cervical spondylosis and stenosis secondary to an industrial accident sustained several years ago. No h/o neck surgery. Good improvement with C7-T1 MARILOU injections by Dr. Patrick Davenport most recently 7/18/2017 & 4/11/2018. Ms. Lino Palomino continues with mild worsening pain since last visit. We began tapering her fentanyl patch. She is upset today as her fentanyl patch was supposed to be adjusted down to 75 mcg every 48 hours but was adjusted down to every 72 hours. This was done in error. I have apologized for her today. I have also asked her to call in the future if she has any questions or concerns regarding her prescriptions so that they may be addressed and corrected. She does remain very reluctant on tapering her medications at this time. We had a very lengthy conversation last visit as well as today regarding changes to our practice. We discussed several different options. She does also understand that she has the option to transfer care as well she does not agree with our new plan of care. We will continue tapering her fentanyl. Please see tapering plan below. She will continue with her oxycodone to use on an as-needed basis. She does report that she has followed up with her psychiatrist who is going to be tapering her Xanax and switching her to a different medication. She was reminded to avoid any benzodiazepines at this time. She does have DMV paperwork with her today and is asking for my recommendation to drive. I did offer to fill out her paperwork, however, I have explained to her that I cannot recommend driving or operating heavy machinery during her current treatment plan. She is very upset about this today.   She has requested to speak with our practice manager regarding this and states that previous providers in our practice have filled out the paperwork for her.  She was reminded that she does have the option to follow-up with his providers if she chooses. I will have her follow-up in 3 months for further evaluation and recommendation or sooner if needed       Medications are helping with pain control and quality of life. Her pain is 4-5/10 with medication and 6-7/10 without. Pt describes pain as throbbing, spasms, and aching. Aggravating factors include cold weather and certain movments. Relieved with rest, medication, and avoiding painful activities. Current treatment is helping to improve general activity, mood, walking, sleep, enjoyment of life    Ms. Tess Beltre is tolerating medications well, with no side effects noted. She is able to stay more active with less discomfort with these current doses. In the past 30 days, the patient reports an average of 45% pain relief with current treatment/medications. She is informed of side effects, risks, and benefits of this regimen, and emphasizes that she derives a significant improvement in functionality and quality of life, and notes that non-opioid medications and therapies in the past have not offered significant benefit. Pt does report constipation but is well controlled with Movantik  She  is otherwise doing well with no other complaints today. She denies any adverse events including nausea, vomiting, dizziness, increased constipation, hallucinations, or seizures. Because the patient's current regimen places him/her at increased risk for possible overdose, a prescription for naloxone nasal spray has been provided.   The patient understands that this medication is only to be used in the setting of a possible overdose and that inadvertent use of this medication could precipitate overt withdrawal.    MME: 240  COMM: 2  ORT: 0  PMA updated: 5/21/2018  Last uds reviewed       Allergies   Allergen Reactions    Oxycontin [Oxycodone] Not Reported This Time     Pt states not allergic to oxycontin         Past Surgical History:   Procedure Laterality Date    HX BREAST BIOPSY      Negative    HX CHOLECYSTECTOMY      HX HYSTERECTOMY      HX LITHOTRIPSY      HX OOPHORECTOMY      HX THYROIDECTOMY      Partial thyroidectomy         Review of Systems   Constitutional: Negative for chills, fever and weight loss. HENT: Negative for congestion and sore throat. Eyes: Negative for blurred vision and double vision. Respiratory: Negative for cough, shortness of breath and wheezing. Cardiovascular: Negative for chest pain and palpitations. Gastrointestinal: Negative for abdominal pain, constipation, diarrhea, heartburn, nausea and vomiting. Genitourinary: Negative. Neurological: Negative for dizziness, seizures, loss of consciousness and headaches. Endo/Heme/Allergies: Does not bruise/bleed easily. Psychiatric/Behavioral: Negative for depression. The patient is not nervous/anxious. Physical Exam   Constitutional: She is oriented to person, place, and time and well-developed, well-nourished, and in no distress. No distress. overweight   HENT:   Head: Normocephalic and atraumatic. Eyes: EOM are normal.   Pulmonary/Chest: Effort normal.   Neurological: She is alert and oriented to person, place, and time. Skin: Skin is warm and dry. No rash noted. She is not diaphoretic. No erythema. Psychiatric: Mood, memory, affect and judgment normal.   Nursing note and vitals reviewed. ASSESSMENT:    1. Cervicalgia    2. Brachial neuritis    3. Spinal stenosis in cervical region    4. Bilateral occipital neuralgia    5. Spasmodic torticollis    6. Osteoarthritis of spine with radiculopathy, cervical region    7. Chronic pain syndrome    8. Cervical radiculopathy           [place] Prescription Monitoring Program was reviewed which does not demonstrate aberrancies and/or inconsistencies with regard to the historical prescribing of controlled medications to this patient by other providers.     PLAN / Pt Instructions:  1. Modify current plan with no evidence of addiction or diversion. Stable on current medication without adverse events. 2. Refill fentanyl 75 mcg patch every 72 hours ×1 month, then adjust down to 50 mcg patch every 72 hours ×1 month, then adjust down to 37.5 mcg patch every 72 hours until next visit. 3. Refill oxycodone 10/325 mg up to 4 times daily as needed. 4. Continue tapering Xanax with his psychiatrist as previously discussed  5. Naloxone 4 mg nasal spray for opioid induced respiratory depression emergency only. 6. Discussed risks of addiction, dependency, and opioid induced hyperalgesia. 7. Please remember to call at least 5 business days prior to your medication refill. 8. Return to clinic in 3 months. Please call and cancel this appointment along with your pain management agreement if you do decide to transition her care by this time. Medications Ordered Today   Medications    oxyCODONE-acetaminophen (PERCOCET)  mg per tablet     Sig: Take 1 Tab by mouth four (4) times daily as needed for Pain for up to 30 days. Max Daily Amount: 4 Tabs. Indications: Pain     Dispense:  120 Tab     Refill:  0    fentaNYL (DURAGESIC) 75 mcg/hr     Si Patch by TransDERmal route every seventy-two (72) hours for 30 days. Max Daily Amount: 1 Patch. Dispense:  10 Patch     Refill:  0    oxyCODONE-acetaminophen (PERCOCET)  mg per tablet     Sig: Take 1 Tab by mouth four (4) times daily as needed for Pain for up to 30 days. Max Daily Amount: 4 Tabs. Indications: Pain     Dispense:  120 Tab     Refill:  0    oxyCODONE-acetaminophen (PERCOCET)  mg per tablet     Sig: Take 1 Tab by mouth four (4) times daily as needed for Pain for up to 30 days. Max Daily Amount: 4 Tabs. Indications: Pain     Dispense:  120 Tab     Refill:  0    fentaNYL (DURAGESIC) 50 mcg/hr PATCH     Si Patch by TransDERmal route every seventy-two (72) hours for 30 days. Max Daily Amount: 1 Patch. Dispense:  10 Patch     Refill:  0    fentaNYL 37.5 mcg/hour pt72     Si Patch by TransDERmal route every seventy-two (72) hours for 30 days. Max Daily Amount: 1 Patch. Dispense:  10 Patch     Refill:  0       DISPOSITION     Pain medications are prescribed with the objective of pain relief and improved physical and psychosocial function in this patient.  Pain Meds and Quality Of Life have been reviewed. Nonpharmacologic therapy and non-opioid pharmacologic therapy have been considered. Opioid therapy is only prescribed if the expected benefits are anticipated to outweigh risks.  Counseled patient on proper use of prescribed medications.  Counseled patient about chronic medical conditions and their relationship to anxiety and depression and recommended mental health support as needed.  Reviewed with patient self-help tools, home exercise, and lifestyle changes to assist the patient in self-management of symptoms.  Reviewed with patient the treatment plan, goals of treatment plan, and limitations of treatment plan, to include the potential for side effects from medications and procedures. If side effects occur, it is the responsibility of the patient to inform the clinic so that a change in the treatment plan can be made in a safe manner. The patient is advised that stopping prescribed medication may cause an increase in symptoms and possible medication withdrawal symptoms. The patient is informed an emergency room evaluation may be necessary if this occurs. Spent 45 minutes with patient today which more than 50% of that time was spent on counseling and coordination of care. Catalina Palafox 2018        Note: Please excuse any typographical errors. Voice recognition software was used for this note and may cause mistakes.

## 2018-08-21 NOTE — PATIENT INSTRUCTIONS
1. Modify current plan with no evidence of addiction or diversion. Stable on current medication without adverse events. 2. Refill fentanyl 75 mcg patch every 72 hours ×1 month, then adjust down to 50 mcg patch every 72 hours ×1 month, then adjust down to 37.5 mcg patch every 72 hours until next visit. 3. Refill oxycodone 10/325 mg up to 4 times daily as needed. 4. Schedule repeat cervical MARILOU with Dr. Wendy Bundy  5. Continue tapering Xanax with his psychiatrist as previously discussed  6. Naloxone 4 mg nasal spray for opioid induced respiratory depression emergency only. 7. Discussed risks of addiction, dependency, and opioid induced hyperalgesia. 8. Please remember to call at least 5 business days prior to your medication refill. 9. Return to clinic in 3 months. Please call and cancel this appointment along with your pain management agreement if you do decide to transition her care by this time.

## 2018-08-21 NOTE — MR AVS SNAPSHOT
[address] [address] 
[phone] Patient: Octavio Mercado MRN: [id number] JBX:[id number] Visit Information Date & Time Provider Department Dept. Phone Encounter #  
 8/21/2018  2:20 PM LETICIA Green [facility] [phone] Follow-up Instructions Return in about 3 months (around 11/21/2018). Upcoming Health Maintenance Date Due Hepatitis C Screening 1960 DTaP/Tdap/Td series (1 - Tdap) 6/29/1980 PAP AKA CERVICAL CYTOLOGY 6/29/1980 BREAST CANCER SCRN MAMMOGRAM 6/29/2009 FOBT Q 1 YEAR AGE 50-75 6/29/2009 Influenza Age 5 to Adult 8/1/2018 MEDICARE YEARLY EXAM 8/17/2018 Allergies as of 8/21/2018  Review Complete On: 8/21/2018 By: LETICIA Green Severity Noted Reaction Type Reactions Oxycontin [Oxycodone]   Intolerance Not Reported This Time Pt states not allergic to oxycontin Current Immunizations  Never Reviewed No immunizations on file. Not reviewed this visit You Were Diagnosed With   
  
 Codes Comments Cervicalgia     ICD-10-CM: M54.2 ICD-9-CM: 723.1 Brachial neuritis     ICD-10-CM: M54.12 
ICD-9-CM: 723.4 Spinal stenosis in cervical region     ICD-10-CM: M48.02 
ICD-9-CM: 723.0 Bilateral occipital neuralgia     ICD-10-CM: M54.81 ICD-9-CM: 723.8 Spasmodic torticollis     ICD-10-CM: G24.3 ICD-9-CM: 333.83 Osteoarthritis of spine with radiculopathy, cervical region     ICD-10-CM: M47.22 
ICD-9-CM: 721.0 Chronic pain syndrome     ICD-10-CM: G89.4 ICD-9-CM: 338.4 Cervical radiculopathy     ICD-10-CM: M54.12 
ICD-9-CM: 723.4 Vitals BP Pulse Temp Resp Height(growth percentile) Weight(growth percentile) (!) 185/98 (!) 54 98.7 °F (37.1 °C) 14 5' 11\" (1.803 m) 224 lb (101.6 kg) BMI OB Status Smoking Status 31.24 kg/m2 Hysterectomy Never Smoker Vitals History BMI and BSA Data Body Mass Index Body Surface Area  
 31.24 kg/m 2 2.26 m 2 Preferred Pharmacy Pharmacy Name Phone [facility] - [facility] [phone] Your Updated Medication List  
  
   
This list is accurate as of 8/21/18  3:53 PM.  Always use your most recent med list.  
  
  
  
  
 CRESTOR 10 mg tablet Generic drug:  rosuvastatin Take 10 mg by mouth nightly.  
  
 ergocalciferol 50,000 unit capsule Commonly known as:  ERGOCALCIFEROL Take 1 Cap by mouth as directed. Once  a week  
  
 ezetimibe 10 mg tablet Commonly known as:  Toni Ringgold Take 10 mg by mouth daily. * fentaNYL 12 mcg/hr patch Commonly known as:  DURAGESIC  
1 Patch by TransDERmal route every fourty-eight (48) hours for 30 days. Max Daily Amount: 1 Patch. Apply along with 100 mcg patch for chronic, severe, refractory pain. Fransisca Loera brands only * fentaNYL 100 mcg/hr PATCH Commonly known as:  DURAGESIC  
1 Patch by TransDERmal route every fourty-eight (48) hours for 30 days. Max Daily Amount: 1 Patch. Apply along with 12 mcg patch to equal 112 mcg for chronic, severe, refractory pain. [place]le brands only  Indications: Chronic Pain with Opioid Tolerance, SEVERE PAIN WITH OPIOID TOLERANCE  
  
 * fentaNYL 75 mcg/hr Commonly known as:  DURAGESIC  
1 Patch by TransDERmal route every seventy-two (72) hours for 30 days. Max Daily Amount: 1 Patch. * fentaNYL 75 mcg/hr Commonly known as:  DURAGESIC  
1 Patch by TransDERmal route every seventy-two (72) hours for 30 days. Max Daily Amount: 1 Patch. Start taking on:  8/26/2018 * fentaNYL 50 mcg/hr PATCH Commonly known as:  DURAGESIC  
1 Patch by TransDERmal route every seventy-two (72) hours for 30 days. Max Daily Amount: 1 Patch. Start taking on:  9/25/2018 * fentaNYL 37.5 mcg/hour Pt72 1 Patch by TransDERmal route every seventy-two (72) hours for 30 days. Max Daily Amount: 1 Patch. Start taking on:  10/24/2018  
  
 lidocaine 5 % Commonly known as:  LIDODERM  
1 Patch by TransDERmal route every twenty-four (24) hours. Apply patch to the affected area for 12 hours a day and remove for 12 hours a day.  
  
 naloxegol 25 mg Tab tablet Commonly known as:  Laurey Maker Take 1 Tab by mouth daily. Take daily before breakfast for opioid induced constipation  Indications: OPIOID-INDUCED CONSTIPATION  
  
 naloxone 4 mg/actuation nasal spray Commonly known as:  NARCAN  
4 mg by Nasal route once as needed (opioid overdose) for up to 1 dose. May substitute 2 mg syringe if insurance requires  
  
 naproxen 500 mg tablet Commonly known as:  NAPROSYN Take 500 mg by mouth daily (with breakfast). NORVASC 10 mg tablet Generic drug:  amLODIPine Take  by mouth daily. * oxyCODONE-acetaminophen  mg per tablet Commonly known as:  PERCOCET Take 1 Tab by mouth four (4) times daily as needed for Pain for up to 30 days. Max Daily Amount: 4 Tabs. Indications: Pain Start taking on:  8/26/2018 * oxyCODONE-acetaminophen  mg per tablet Commonly known as:  PERCOCET Take 1 Tab by mouth four (4) times daily as needed for Pain for up to 30 days. Max Daily Amount: 4 Tabs. Indications: Pain Start taking on:  9/25/2018 * oxyCODONE-acetaminophen  mg per tablet Commonly known as:  PERCOCET Take 1 Tab by mouth four (4) times daily as needed for Pain for up to 30 days. Max Daily Amount: 4 Tabs. Indications: Pain Start taking on:  10/24/2018 SYNTHROID 175 mcg tablet Generic drug:  levothyroxine Take 200 mcg by mouth Daily (before breakfast). TENS Units Victorine Libman Commonly known as:  TENS 502  
1 Units by Does Not Apply route as directed. Transparent Dressings 3 1/2 X 4 \" Bndg Commonly known as:  TEGADERM  
 2 Patches by Apply Externally route every fourty-eight (48) hours. Dispense two boxes XANAX 0.5 mg tablet Generic drug:  ALPRAZolam  
Take 1 mg by mouth three (3) times daily as needed. Take 1- 1/2 tabs tid prn * Notice: This list has 9 medication(s) that are the same as other medications prescribed for you. Read the directions carefully, and ask your doctor or other care provider to review them with you. Prescriptions Printed Refills  
 oxyCODONE-acetaminophen (PERCOCET)  mg per tablet 0 Starting on: 2018 Sig: Take 1 Tab by mouth four (4) times daily as needed for Pain for up to 30 days. Max Daily Amount: 4 Tabs. Indications: Pain Class: Print Route: Oral  
 fentaNYL (DURAGESIC) 75 mcg/hr 0 Starting on: 2018 Si Patch by TransDERmal route every seventy-two (72) hours for 30 days. Max Daily Amount: 1 Patch. Class: Print Route: TransDERmal  
 oxyCODONE-acetaminophen (PERCOCET)  mg per tablet 0 Starting on: 2018 Sig: Take 1 Tab by mouth four (4) times daily as needed for Pain for up to 30 days. Max Daily Amount: 4 Tabs. Indications: Pain Class: Print Route: Oral  
 oxyCODONE-acetaminophen (PERCOCET)  mg per tablet 0 Starting on: 10/24/2018 Sig: Take 1 Tab by mouth four (4) times daily as needed for Pain for up to 30 days. Max Daily Amount: 4 Tabs. Indications: Pain Class: Print Route: Oral  
 fentaNYL (DURAGESIC) 50 mcg/hr PATCH 0 Starting on: 2018 Si Patch by TransDERmal route every seventy-two (72) hours for 30 days. Max Daily Amount: 1 Patch. Class: Print Route: TransDERmal  
 fentaNYL 37.5 mcg/hour pt72 0 Starting on: 10/24/2018 Si Patch by TransDERmal route every seventy-two (72) hours for 30 days. Max Daily Amount: 1 Patch. Class: Print Route: TransDERmal  
  
Follow-up Instructions Return in about 3 months (around 2018). Patient Instructions 1. Modify current plan with no evidence of addiction or diversion. Stable on current medication without adverse events. 2. Refill fentanyl 75 mcg patch every 72 hours ×1 month, then adjust down to 50 mcg patch every 72 hours ×1 month, then adjust down to 37.5 mcg patch every 72 hours until next visit. 3. Refill oxycodone 10/325 mg up to 4 times daily as needed. 4. Schedule repeat cervical MARILOU with Dr. Anabel Sandoval 5. Continue tapering Xanax with his psychiatrist as previously discussed 6. Naloxone 4 mg nasal spray for opioid induced respiratory depression emergency only. 7. Discussed risks of addiction, dependency, and opioid induced hyperalgesia. 8. Please remember to call at least 5 business days prior to your medication refill. 9. Return to clinic in 3 months. Please call and cancel this appointment along with your pain management agreement if you do decide to transition her care by this time. Introducing [facility] SERVICES! [employer] introduces Treedom patient portal. Now you can access parts of your medical record, email your doctor's office, and request medication refills online. 1. In your internet browser, go to [email or web address] 2. Click on the First Time User? Click Here link in the Sign In box. You will see the New Member Sign Up page. 3. Enter your Treedom Access Code exactly as it appears below. You will not need to use this code after youve completed the sign-up process. If you do not sign up before the expiration date, you must request a new code. · Treedom Access Code: [email or web address] Expires: 11/19/2018  3:53 PM 
 
4. Enter the last four digits of your Social Security Number (xxxx) and Date of Birth (mm/dd/yyyy) as indicated and click Submit. You will be taken to the next sign-up page. 5. Create a Treedom ID. This will be your Treedom login ID and cannot be changed, so think of one that is secure and easy to remember. 6. Create a TransLattice password. You can change your password at any time. 7. Enter your Password Reset Question and Answer. This can be used at a later time if you forget your password. 8. Enter your e-mail address. You will receive e-mail notification when new information is available in [address] 9. Click Sign Up. You can now view and download portions of your medical record. 10. Click the Download Summary menu link to download a portable copy of your medical information. If you have questions, please visit the Frequently Asked Questions section of the TransLattice website. Remember, TransLattice is NOT to be used for urgent needs. For medical emergencies, dial 911. Now available from your iPhone and Android! Please provide this summary of care documentation to your next provider. Your primary care clinician is listed as Rose Jones. If you have any questions after today's visit, please call [phone].

## 2018-09-21 ENCOUNTER — TELEPHONE (OUTPATIENT)
Dept: PAIN MANAGEMENT | Age: 58
End: 2018-09-21

## 2018-09-21 NOTE — TELEPHONE ENCOUNTER
Medication refill called in [id number] 2:48pm    1. Name, verified  2. Medicine requested - fentanyl, percocet  3. [phone]  4. Analgesia - 70% pain relief  5. ADL - yes  6.   Adverse reaction to medicine - no

## 2018-09-24 NOTE — TELEPHONE ENCOUNTER
Octavio Mercado has called requesting a refill of their controlled medication, fentanyl and percocet, for the management of her chronic neck pain. Last office visit date: 08/21/18    Date last  was pulled and reviewed : 09/24/18, last fill date for both meds was 08/26/18. Pt is still receiving benzo from outside provider. Was the patient compliant when the above report was pulled? yes    Analgesia: pt reports a 70% pain relief with her current opioid medication regimen     Aberrancies: none noted     ADL's: pt reports that she is able to perform her daily duties because she is taking her medication     Adverse Reaction: none reported by the pt at this time. Provider's last note and plan of care reviewed? Yes, pre-printed prescriptions   Request forwarded to provider for review.

## 2018-09-24 NOTE — TELEPHONE ENCOUNTER
Pt was called back about her prescription request. She was verified using 2 pt identifiers. The pt was notified that her prescriptions were done and ready for . She verbalized understanding and has no questions.

## 2018-10-25 ENCOUNTER — TELEPHONE (OUTPATIENT)
Dept: PAIN MANAGEMENT | Age: 58
End: 2018-10-25

## 2018-10-25 NOTE — TELEPHONE ENCOUNTER
Vikki Thrasher has called requesting a refill of their controlled medication, percocet and fentanyl, for the management of neck pain. Last office visit date: 8/21/18 with Radha White, next 11/7/18 Chidineda Michel     Date last  was pulled and reviewed : 10/25/18 last filled both 9/25/18    Was the patient compliant when the above report was pulled? yes    Analgesia: 60%    Aberrancies: none    ADL's:  Yes, some     Adverse Reaction: no    Provider's last note and plan of care reviewed? yes  Request forwarded to provider for review. Contract 5/21/18    UDS 5/24/18   prescriptions pre-printed by provider.

## 2018-10-28 NOTE — TELEPHONE ENCOUNTER
Remind patient to avoid concurrent use of benzodiazepines and opioids. Reviewed. OK to distribute prescription.

## 2018-10-29 NOTE — TELEPHONE ENCOUNTER
Attempted to contact patient regarding prescription pick-up ; no answer noted at number given; vm left to contact triage line. Reminded pt to avoid concurrent Bzd and opioids. Patient verbalized understanding.

## 2018-10-31 ENCOUNTER — TELEPHONE (OUTPATIENT)
Dept: PAIN MANAGEMENT | Age: 58
End: 2018-10-31

## 2018-10-31 NOTE — TELEPHONE ENCOUNTER
The pt had called to check on the status of her prescription refill request. She states that she has not heard anything. A chart review was done and the refill request has been done. An attempt was made to contact the pt by another nurse to reach the pt. A message was left. I called the pt again to reach her and she did not answer again. A message was left for the pt letting her know that her refill was approved and is ready for . The number to the office was provided for the pt in case she has any questions. The pt did identify herself on the voicemail.

## 2018-11-15 ENCOUNTER — OFFICE VISIT (OUTPATIENT)
Dept: PAIN MANAGEMENT | Age: 58
End: 2018-11-15

## 2018-11-15 VITALS
DIASTOLIC BLOOD PRESSURE: 103 MMHG | HEART RATE: 72 BPM | RESPIRATION RATE: 22 BRPM | SYSTOLIC BLOOD PRESSURE: 199 MMHG | TEMPERATURE: 99 F | OXYGEN SATURATION: 93 % | WEIGHT: 224 LBS | BODY MASS INDEX: 31.36 KG/M2 | HEIGHT: 71 IN

## 2018-11-15 DIAGNOSIS — G24.3 SPASMODIC TORTICOLLIS: ICD-10-CM

## 2018-11-15 DIAGNOSIS — G89.4 CHRONIC PAIN SYNDROME: ICD-10-CM

## 2018-11-15 DIAGNOSIS — M54.2 CERVICALGIA: ICD-10-CM

## 2018-11-15 DIAGNOSIS — M47.22 OSTEOARTHRITIS OF SPINE WITH RADICULOPATHY, CERVICAL REGION: ICD-10-CM

## 2018-11-15 DIAGNOSIS — M50.30 DEGENERATION OF CERVICAL INTERVERTEBRAL DISC: ICD-10-CM

## 2018-11-15 DIAGNOSIS — M54.12 CERVICAL RADICULOPATHY: ICD-10-CM

## 2018-11-15 DIAGNOSIS — M54.81 BILATERAL OCCIPITAL NEURALGIA: ICD-10-CM

## 2018-11-15 DIAGNOSIS — M54.12 BRACHIAL NEURITIS: ICD-10-CM

## 2018-11-15 DIAGNOSIS — Z79.899 ENCOUNTER FOR LONG-TERM (CURRENT) USE OF HIGH-RISK MEDICATION: Primary | ICD-10-CM

## 2018-11-15 DIAGNOSIS — M48.02 SPINAL STENOSIS IN CERVICAL REGION: ICD-10-CM

## 2018-11-15 LAB
ALCOHOL UR POC: NORMAL
AMPHETAMINES UR POC: NEGATIVE
BARBITURATES UR POC: NEGATIVE
BENZODIAZEPINES UR POC: NEGATIVE
BUPRENORPHINE UR POC: NEGATIVE
CANNABINOIDS UR POC: NEGATIVE
CARISOPRODOL UR POC: NORMAL
COCAINE UR POC: NEGATIVE
FENTANYL UR POC: NORMAL
MDMA/ECSTASY UR POC: NEGATIVE
METHADONE UR POC: NEGATIVE
METHAMPHETAMINE UR POC: NEGATIVE
METHYLPHENIDATE UR POC: NORMAL
OPIATES UR POC: NEGATIVE
OXYCODONE UR POC: NORMAL
PHENCYCLIDINE UR POC: NORMAL
PROPOXYPHENE UR POC: NORMAL
TRAMADOL UR POC: NORMAL
TRICYCLICS UR POC: NEGATIVE

## 2018-11-15 RX ORDER — HYDRALAZINE HYDROCHLORIDE 50 MG/1
25 TABLET, FILM COATED ORAL 3 TIMES DAILY
COMMUNITY

## 2018-11-15 RX ORDER — GABAPENTIN 300 MG/1
CAPSULE ORAL
Qty: 90 CAP | Refills: 0 | Status: SHIPPED | OUTPATIENT
Start: 2018-11-15 | End: 2019-04-02 | Stop reason: SINTOL

## 2018-11-15 RX ORDER — OXYCODONE AND ACETAMINOPHEN 10; 325 MG/1; MG/1
1 TABLET ORAL
Qty: 120 TAB | Refills: 0 | Status: SHIPPED | OUTPATIENT
Start: 2018-11-15 | End: 2018-12-19 | Stop reason: SDUPTHER

## 2018-11-15 RX ORDER — FENTANYL 25 UG/1
1 PATCH TRANSDERMAL
Qty: 10 PATCH | Refills: 0 | Status: SHIPPED | OUTPATIENT
Start: 2018-11-15 | End: 2018-12-19 | Stop reason: DRUGHIGH

## 2018-11-15 RX ORDER — CLONIDINE HYDROCHLORIDE 0.1 MG/1
0.1 TABLET ORAL 2 TIMES DAILY
COMMUNITY

## 2018-11-15 NOTE — PROGRESS NOTES
Internal Medicine Progress NoteToday's Date:  11/15/2018 Patient:  Jalen Wheeler Patient :  [id number] Subjective: Chief Complaint Patient presents with  Neck Pain  Shoulder Pain  
  left HPI: Jalen Wheeler is a 60 y.o.  female who presents for follow up. Pt last seen in August, and has been on a gradual when since. She is unhappy with her present plan of treatment not so much due to the planned wean, but because she feels she has not been given treatment options. She states she has not had an Ortho evaluation since her injury several years prior. She also relates other non-opioid medication options had not previously been explored. She states her last cervical MARILOU was in April of this year and did afford some relief. She states there had been no discussion of possible repeat injections. Her pain presently is 5/10 with medication and 8/10 without. Past Medical History:  
Diagnosis Date  Brachial neuritis or radiculitis NOS   
 Calculus of kidney  Cervicalgia  Degeneration of cervical intervertebral disc  GERD (gastroesophageal reflux disease)  Hypertension  Ill-defined condition Gout  Pancreatitis   Thyroid cancer ([facility] [address] Past Surgical History:  
Procedure Laterality Date  HX BREAST BIOPSY Negative  HX CHOLECYSTECTOMY  HX HYSTERECTOMY  HX LITHOTRIPSY  HX OOPHORECTOMY  HX THYROIDECTOMY Partial thyroidectomy REVIEW OF SYSTEMS:  
Constitutional  no wt loss, night sweats, unexplained fevers Oral  no mouth pain, tongue or tooth problems, no swallowing problems Cardiac  no CP, PND, orthopnea, palpitations or syncope Resp  no wheezing, chronic coughing, dyspnea GI  no heartburn, nausea, vomiting,constipation, diarrhea,bleeding.   no dysuria, hematuria, urgency, frequency Ortho  no muscle swelling,joint swelling, joint pain,  myalgias Derm  no  rashes, lesions. Psych  denies any anxiety or depression symptoms, no hallucinations, suicidal, or violent ideation Neuro  no focal weakness,incoordination, ataxia, involuntary movements Endo - no polyuria, polydipsia, nocturia, hot flashes Calculated MEQ - 150 Naloxone rescue - yes Prophylactic bowel program - yes DAST SCORE- 1 
PHQ-9- 2 PHQ over the last two weeks 11/15/2018 PHQ Not Done - Little interest or pleasure in doing things Several days Feeling down, depressed, irritable, or hopeless Several days Total Score PHQ 2 2 Madeleine Castro MD 
[address] [address] Current Outpatient Meds and Allergies Current Outpatient Medications on File Prior to Visit Medication Sig Dispense Refill  cloNIDine HCl (CATAPRES) 0.1 mg tablet Take  by mouth two (2) times a day.  hydrALAZINE (APRESOLINE) 50 mg tablet Take 25 mg by mouth three (3) times daily.  naloxone 4 mg/actuation spry 4 mg by Nasal route once as needed (opioid overdose) for up to 1 dose. May substitute 2 mg syringe if insurance requires 1 Package 0  
 naloxegol (MOVANTIK) 25 mg tab tablet Take 1 Tab by mouth daily. Take daily before breakfast for opioid induced constipation  Indications: OPIOID-INDUCED CONSTIPATION 30 Tab 11  
 rosuvastatin (CRESTOR) 10 mg tablet Take 10 mg by mouth nightly.  ALPRAZolam (XANAX) 0.5 mg tablet Take 1 mg by mouth three (3) times daily as needed. Take 1- 1/2 tabs tid prn  TENS Units (TENS 502) Deepa 1 Units by Does Not Apply route as directed. 1 Units 0  
 levothyroxine (SYNTHROID) 175 mcg tablet Take 200 mcg by mouth Daily (before breakfast).  [DISCONTINUED] oxyCODONE-acetaminophen (PERCOCET)  mg per tablet Take 1 Tab by mouth four (4) times daily as needed for Pain for up to 30 days. Max Daily Amount: 4 Tabs. Indications: Pain 120 Tab 0  [DISCONTINUED] fentaNYL (DURAGESIC) 75 mcg/hr 1 Patch by TransDERmal route every seventy-two (72) hours for 30 days. Max Daily Amount: 1 Patch. 10 Patch 0  
 [DISCONTINUED] oxyCODONE-acetaminophen (PERCOCET)  mg per tablet Take 1 Tab by mouth four (4) times daily as needed for Pain for up to 30 days. Max Daily Amount: 4 Tabs. Indications: Pain 120 Tab 0  [DISCONTINUED] oxyCODONE-acetaminophen (PERCOCET)  mg per tablet Take 1 Tab by mouth four (4) times daily as needed for Pain for up to 30 days. Max Daily Amount: 4 Tabs. Indications: Pain 120 Tab 0  [DISCONTINUED] fentaNYL (DURAGESIC) 50 mcg/hr PATCH 1 Patch by TransDERmal route every seventy-two (72) hours for 30 days. Max Daily Amount: 1 Patch. 10 Patch 0  
 [DISCONTINUED] fentaNYL 37.5 mcg/hour pt72 1 Patch by TransDERmal route every seventy-two (72) hours for 30 days. Max Daily Amount: 1 Patch. 10 Patch 0  
 [DISCONTINUED] fentaNYL (DURAGESIC) 75 mcg/hr 1 Patch by TransDERmal route every seventy-two (72) hours for 30 days. Max Daily Amount: 1 Patch. 10 Patch 0  
 fentaNYL (DURAGESIC) 100 mcg/hr PATCH 1 Patch by TransDERmal route every fourty-eight (48) hours for 30 days. Max Daily Amount: 1 Patch. Apply along with 12 mcg patch to equal 112 mcg for chronic, severe, refractory pain. Valle brands only  Indications: Chronic Pain with Opioid Tolerance, SEVERE PAIN WITH OPIOID TOLERANCE 15 Patch 0  
 ezetimibe (ZETIA) 10 mg tablet Take 10 mg by mouth daily. 0  
 fentaNYL (DURAGESIC) 12 mcg/hr patch 1 Patch by TransDERmal route every fourty-eight (48) hours for 30 days. Max Daily Amount: 1 Patch. Apply along with 100 mcg patch for chronic, severe, refractory pain. Valle brands only 15 Patch 0  
 naproxen (NAPROSYN) 500 mg tablet Take 500 mg by mouth daily (with breakfast).  lidocaine (LIDODERM) 5 % 1 Patch by TransDERmal route every twenty-four (24) hours. Apply patch to the affected area for 12 hours a day and remove for 12 hours a day.  1 Each 0  
  Transparent Dressings (TEGADERM) 3 1/2 X 4 \" bndg 2 Patches by Apply Externally route every fourty-eight (48) hours. Dispense two boxes 50 Each 11  
 amLODIPine (NORVASC) 10 mg tablet Take  by mouth daily.  ergocalciferol (ERGOCALCIFEROL) 50,000 unit capsule Take 1 Cap by mouth as directed. Once  a week 8 Cap 5 No current facility-administered medications on file prior to visit. Allergies Allergen Reactions  Oxycontin [Oxycodone] Not Reported This Time Pt states not allergic to oxycontin Objective:    
 
BP Readings from Last 3 Encounters:  
11/15/18 (!) 199/103  
08/21/18 (!) 185/98  
07/27/18 180/86 VS:   
Visit Vitals BP (!) 199/103 (BP 1 Location: Right arm, BP Patient Position: Sitting) Pulse 72 Temp 99 °F (37.2 °C) (Oral) Resp 22 Ht 5' 11\" (1.803 m) Wt 101.6 kg (224 lb) SpO2 93% BMI 31.24 kg/m² Body mass index is 31.24 kg/m². GENERAL: W/D, W/N, Female in no acute distress. APPEARANCE: Well groomed, Appropriately Dressed. ATTITUDE: Cooperative, Guarded. SPEECH: Normal Rate, Clear. AFFECT: Appropriate, Worried. MOOD: Irritable. THOUGHT PROCESS: Linear, Logical, Normal Judgement and Insight. THOUGHT CONTENT: Normal 
MEMORY: Grossly Intact. HEENT -- NCAT, Anicteric sclerae. Mus/Skel--  bulk and tone appear normal. 
Derm-- no obvious abnormalities noted. Study Result from 7/26/2016: 
 
Sagittal and axial multisequence MR images of cervical spine were obtained.  
  
Normal cervical alignment. No compression fracture or pathologic marrow signal. 
No prevertebral soft tissue abnormalities. Craniocervical junction is normal. 
Spinal cord shows normal signal intensity morphology throughout. 
  
C2-C3: No disc herniation or central stenosis. No foraminal stenosis. 
  
C3-C4: Posterior lateral disc protrusion, worse on the right. Uncovertebral and 
facet hypertrophy. Severe right and moderate left foraminal stenosis.  
  
 C4-C5: Posterior lateral corner disc protrusion. Mild posterior disc bulge. No 
central stenosis or cord contact. Moderate bilateral foraminal stenosis. 
  
C5-C6: Similar findings of posterior disc bulge and posterior lateral corner 
disc protrusion. No central stenosis or cord contact. Moderate bilateral 
foraminal stenosis. 
  
C6-C7: No disc herniation or central stenosis. Facet and uncovertebral 
hypertrophy with right foraminal stenosis. Patent left foramen. 
  
C7-T1: No disc herniation or central stenosis. No foraminal stenosis. No central 
stenosis. 
  
IMPRESSION IMPRESSION: Mainly posterior lateral corner disc protrusion and 
facet/uncovertebral hypertrophy with foraminal stenosis as described. Posterior 
disc disease most prominent at C4-C5 and C5-C6. No significant central stenosis Results for orders placed or performed in visit on 11/15/18 AMB POC DRUG SCREEN () Result Value Ref Range ALCOHOL UR POC AMPHETAMINES UR POC Negative CARISOPRODOL UR POC    
 COCAINE UR POC Negative FENTANYL UR POC    
 MDMA/ECSTASY UR POC Negative METHADONE UR POC Negative METHAMPHETAMINE UR POC Negative METHYLPHENIDATE UR POC    
 OPIATES UR POC Negative OXYCODONE UR POC Presumptive Positive PHENCYCLIDINE UR POC PROPOXYPHENE UR POC    
 TRAMADOL UR POC    
 TRICYCLICS UR POC Negative BARBITURATES UR POC Negative BENZODIAZEPINES UR POC Negative CANNABINOIDS UR POC Negative BUPRENORPHINE UR POC Negative Assessment/Plan & Orders:    
I have reviewed this patient's report generated by the [facility] which does not demonstrate aberrancies and inconsistencies with regard to the historical prescribing of controlled medications to this patient by other providers. Problem List Items Addressed This Visit Bilateral occipital neuralgia  Relevant Medications  
 fentaNYL (DURAGESIC) 25 mcg/hr PATCH  
 oxyCODONE-acetaminophen (PERCOCET)  mg per tablet Brachial neuritis Relevant Medications  
 gabapentin (NEURONTIN) 300 mg capsule  
 fentaNYL (DURAGESIC) 25 mcg/hr PATCH  
 oxyCODONE-acetaminophen (PERCOCET)  mg per tablet Cervical radiculopathy Relevant Medications  
 gabapentin (NEURONTIN) 300 mg capsule  
 fentaNYL (DURAGESIC) 25 mcg/hr PATCH  
 oxyCODONE-acetaminophen (PERCOCET)  mg per tablet Other Relevant Orders REFERRAL TO ORTHOPEDICS Cervicalgia Relevant Medications  
 oxyCODONE-acetaminophen (PERCOCET)  mg per tablet Chronic pain syndrome Relevant Medications  
 oxyCODONE-acetaminophen (PERCOCET)  mg per tablet Degeneration of cervical intervertebral disc Relevant Medications  
 fentaNYL (DURAGESIC) 25 mcg/hr PATCH  
 oxyCODONE-acetaminophen (PERCOCET)  mg per tablet Other Relevant Orders REFERRAL TO ORTHOPEDICS Osteoarthritis of spine with radiculopathy, cervical region Relevant Medications  
 gabapentin (NEURONTIN) 300 mg capsule  
 fentaNYL (DURAGESIC) 25 mcg/hr PATCH  
 oxyCODONE-acetaminophen (PERCOCET)  mg per tablet Spasmodic torticollis Relevant Medications  
 gabapentin (NEURONTIN) 300 mg capsule  
 fentaNYL (DURAGESIC) 25 mcg/hr PATCH  
 oxyCODONE-acetaminophen (PERCOCET)  mg per tablet Other Relevant Orders REFERRAL TO ORTHOPEDICS Spinal stenosis in cervical region Relevant Medications  
 oxyCODONE-acetaminophen (PERCOCET)  mg per tablet Other Relevant Orders REFERRAL TO ORTHOPEDICS Other Visit Diagnoses Encounter for long-term (current) use of high-risk medication    -  Primary Relevant Medications  
 gabapentin (NEURONTIN) 300 mg capsule  
 fentaNYL (DURAGESIC) 25 mcg/hr PATCH Other Relevant Orders DRUG SCREEN  
 AMB POC DRUG SCREEN () (Completed) Diagnoses and all orders for this visit: 
 
 1. Encounter for long-term (current) use of high-risk medication 
-     DRUG SCREEN 
-     AMB POC DRUG SCREEN () 
-     gabapentin (NEURONTIN) 300 mg capsule; 1 tab on day one, 1 tab bid on days two and three, then 1 tab tid on day four and beyond. -     fentaNYL (DURAGESIC) 25 mcg/hr PATCH; 1 Patch by TransDERmal route every seventy-two (72) hours. Max Daily Amount: 1 Patch. 2. Cervical radiculopathy 
-     REFERRAL TO ORTHOPEDICS 
-     gabapentin (NEURONTIN) 300 mg capsule; 1 tab on day one, 1 tab bid on days two and three, then 1 tab tid on day four and beyond. -     fentaNYL (DURAGESIC) 25 mcg/hr PATCH; 1 Patch by TransDERmal route every seventy-two (72) hours. Max Daily Amount: 1 Patch. 
-     oxyCODONE-acetaminophen (PERCOCET)  mg per tablet; Take 1 Tab by mouth every six (6) hours as needed for Pain for up to 30 days. Max Daily Amount: 4 Tabs. 3. Degeneration of cervical intervertebral disc 
-     REFERRAL TO ORTHOPEDICS 
-     fentaNYL (DURAGESIC) 25 mcg/hr PATCH; 1 Patch by TransDERmal route every seventy-two (72) hours. Max Daily Amount: 1 Patch. 4. Osteoarthritis of spine with radiculopathy, cervical region 
-     gabapentin (NEURONTIN) 300 mg capsule; 1 tab on day one, 1 tab bid on days two and three, then 1 tab tid on day four and beyond. -     fentaNYL (DURAGESIC) 25 mcg/hr PATCH; 1 Patch by TransDERmal route every seventy-two (72) hours. Max Daily Amount: 1 Patch. 
-     oxyCODONE-acetaminophen (PERCOCET)  mg per tablet; Take 1 Tab by mouth every six (6) hours as needed for Pain for up to 30 days. Max Daily Amount: 4 Tabs. 5. Cervicalgia 
-     oxyCODONE-acetaminophen (PERCOCET)  mg per tablet; Take 1 Tab by mouth every six (6) hours as needed for Pain for up to 30 days. Max Daily Amount: 4 Tabs. 6. Brachial neuritis 
-     oxyCODONE-acetaminophen (PERCOCET)  mg per tablet;  Take 1 Tab by mouth every six (6) hours as needed for Pain for up to 30 days. Max Daily Amount: 4 Tabs. 7. Spinal stenosis in cervical region 
-     REFERRAL TO ORTHOPEDICS 
-     oxyCODONE-acetaminophen (PERCOCET)  mg per tablet; Take 1 Tab by mouth every six (6) hours as needed for Pain for up to 30 days. Max Daily Amount: 4 Tabs. 8. Bilateral occipital neuralgia 
-     oxyCODONE-acetaminophen (PERCOCET)  mg per tablet; Take 1 Tab by mouth every six (6) hours as needed for Pain for up to 30 days. Max Daily Amount: 4 Tabs. 9. Spasmodic torticollis 
-     REFERRAL TO ORTHOPEDICS 
-     oxyCODONE-acetaminophen (PERCOCET)  mg per tablet; Take 1 Tab by mouth every six (6) hours as needed for Pain for up to 30 days. Max Daily Amount: 4 Tabs. 10. Chronic pain syndrome 
-     oxyCODONE-acetaminophen (PERCOCET)  mg per tablet; Take 1 Tab by mouth every six (6) hours as needed for Pain for up to 30 days. Max Daily Amount: 4 Tabs. She is to f/u with her PCP regarding her elevated BP, which she attributes to her present level of frustration and her pain. MME =  120   Post reduction in medication. Follow-up Disposition: 
Return in about 1 month (around 12/15/2018). Reviewed with patient the treatment plan, goals of treatment plan, and limitations of treatment plan, to include the potential for side effects from medications and procedures. If side effects occur, it is the responsibility of the patient to inform the clinic so that a change in the treatment plan can be made in a safe manner. The patient is advised that stopping prescribed medication may cause an increase in symptoms and possible medication withdrawal symptoms. The patient is informed an emergency room evaluation may be necessary if this occurs.  
Had lengthy discussion with Pt regarding the direction of this facility away from opioids being the primary treatment option, and the need for exhaustion all other potential options to address her pain I spent 45 minutes with the patient in face-to-face consultation, of which greater than 50% was spent in counseling and coordination of care as described above. Patient verbalized understanding and is in agreement with treatment plan as outlined above. All questions answered.  
 
 
 
Daniel Dumont MD

## 2018-11-15 NOTE — PROGRESS NOTES
Nursing Notes Patient presents to the office today in follow-up. Patient rates her pain at 8/10 on the numerical pain scale. Reviewed medications with counts as follows:   
Rx Date filled Qty Dispensed Pill Count Last Dose Short Fentanyl 37.5mcg 11/06/18 10 6 Yesterday  No   
Percocet 10/325mg  11/05/18 120 81 Today  No  
       
       
          
 
 
Last opioid agreement 05/2018 Last urine drug screen today Comments: b/p elevated; patient states that she is in pain ; provider made aware. POC UDS was performed in office today per verbal order of provider (NUSRAT); rbv'd. Any new labs or imaging since last appointment? YES ; labwork , CT of head ,  cxr , Have you been to an emergency room (ER) or urgent care clinic since your last visit? YES ; [facility] emergency dept Have you been hospitalized since your last visit? YES If yes, where, when, and reason for visit? [address] in 11/2018 due to various ailments Have you seen or consulted any other health care providers outside of the [address] [place] [address]  since your last visit? YES If yes, where, when, and reason for visit? Emergency dept physicians, hospitalists, pcp,  
MsAlem Gomez has a reminder for a \"due or due soon\" health maintenance. I have asked that she contact her primary care provider for follow-up on this health maintenance. PHQ over the last two weeks 11/15/2018 PHQ Not Done - Little interest or pleasure in doing things Several days Feeling down, depressed, irritable, or hopeless Several days Total Score PHQ 2 2

## 2018-11-15 NOTE — PATIENT INSTRUCTIONS
Learning About Opioids Introduction Opioids are medicines used to relieve moderate to severe pain. They may be used for a short time for pain, such as after surgery. Or in some cases a doctor might prescribe them for long-term pain. They don't cure a health problem. But they help you manage the pain. Opioids relieve pain by changing the way your body feels pain and the way you feel about pain. Sometimes opioids are used for people who can't take other pain medicines. They may be prescribed if you have heart, kidney, or liver problems. For instance, you may take an opioid instead of nonsteroidal anti-inflammatory drugs (NSAIDs). NSAIDs include ibuprofen (Advil, Motrin) and naproxen (Aleve). Opioids are strong medicines. They can help you manage pain when you use them the right way. But if you misuse them, they can cause serious harm and even death. If you decide to take opioids, here are some things to remember. · Keep your doctor informed. You can get addicted to opioids. The risk is higher if you have a history of substance use. Your doctor will monitor you closely for signs of misuse and addiction and to figure out when you no longer need to take opioids. · Make a treatment plan. The goal of your plan is to be able to function and do the things you need to do, even if you still have some pain. You might be able to manage your pain with other non-opioid options like physical therapy, relaxation, or over-the-counter pain medicines. · Be aware of the side effects. Opioids can cause serious side effects, such as constipation, dry mouth, and nausea. And over time, you may need a higher dose to get pain relief. This is called tolerance. Your body also gets used to opioids. This is called physical dependence. If you suddenly stop taking them, you may have withdrawal symptoms. Examples Opioids or other medicines that contain them include: · Codeine (Tylenol 3). · Hydrocodone (Norco). · Oxycodone (OxyContin, Percocet). Safety tips If you need to take opioids to manage your pain, remember these safety tips. · Follow directions carefully. It's easy to misuse opioids if you take a dose other than what's prescribed by your doctor. This can lead to overdose and even death. Even sharing them with someone they weren't meant for is misuse. · Be cautious. Opioids may affect your judgment and decision making. Do not drive or operate machinery until you can think clearly. Talk with your doctor about when it is safe to drive. · Reduce the risk of drug interactions. Opioids can be dangerous if you take them with alcohol or with certain drugs like sleeping pills and muscle relaxers. Make sure your doctor knows about all the other medicines you take, including over-the-counter medicines. Don't start any new medicines before you talk to your doctor or pharmacist. 
· Safely store and dispose of opioids. Store opioids in a safe and secure place. Make sure that pets, children, friends, and family can't get to them. When you're done using opioids, make sure to dispose of them safely and as quickly as possible. The U.S. Food and Drug Administration (FDA) recommends these disposal options. ? The best option is to take your medicine to a drop-off box or take-back program that is authorized by the [address] ([facility]). ? If these programs aren't available in your area and your medicine doesn't have specific disposal instructions (such as flushing), you can throw them into your household trash if you follow the FDA's instructions. Visit fda.gov and search for \"unused medicine disposal.\" 
? If you have opioid patches (used or unused), your options are to take them to a [facility]-authorized site or flush them down the toilet. Do not throw them in the trash. ? Only flush your medicine down the toilet if you can't get to a [facility]-approved site or your medicine instructions state clearly to flush them. · Reduce the risk of overdose. Misuse of opioids can be very dangerous. Protect yourself by asking your doctor about a naloxone rescue kit. It can help youand even save your lifeif you take too much of an opioid. Side effects Common side effects include: 
· Constipation. · Feeling dizzy or lightheaded. You may feel like you might faint. · Feeling sleepy. · Nausea or vomiting. You may have other side effects or reactions. Check the information that comes with your medicine. When should you call for help? Call 911 anytime you think you may need emergency care. For example, call if: 
· You have symptoms of a severe allergic reaction. These may include: 
? Sudden raised, red areas (hives) all over your body. ? Swelling of the throat, mouth, lips, or tongue. ? Trouble breathing. ? Passing out (losing consciousness). Or you may feel very lightheaded or suddenly feel weak, confused, or restless. · You have signs of an overdose. These include: 
? Cold, clammy skin. ? Confusion. ? Severe nervousness or restlessness. ? Severe dizziness, drowsiness, or weakness. ? Slow breathing. ? Seizures. Call your doctor now or seek immediate medical care if: 
· You have symptoms of an allergic reaction, such as: ? A rash or hives (raised, red areas on the skin). ? Itching. ? Swelling. ? Belly pain, nausea, or vomiting. Watch closely for changes in your health, and be sure to contact your doctor if: 
· Your medicine is not helping with the pain. · You are having side effects, such as constipation. Where can you learn more? Go to http://angie-nile.info/. Enter U256 in the search box to learn more about \"Learning About Opioids. \" Current as of: June 4, 2018 Content Version: 11.8 © 8067-3382 [employer].  Care instructions adapted under license by [employer] (which disclaims liability or warranty for this information). If you have questions about a medical condition or this instruction, always ask your healthcare professional. [place] [address] any warranty or liability for your use of this information.

## 2018-12-12 ENCOUNTER — OFFICE VISIT (OUTPATIENT)
Dept: PAIN MANAGEMENT | Age: 58
End: 2018-12-12

## 2018-12-12 VITALS
TEMPERATURE: 99.7 F | DIASTOLIC BLOOD PRESSURE: 95 MMHG | OXYGEN SATURATION: 97 % | HEART RATE: 60 BPM | RESPIRATION RATE: 24 BRPM | BODY MASS INDEX: 31.36 KG/M2 | WEIGHT: 224 LBS | HEIGHT: 71 IN | SYSTOLIC BLOOD PRESSURE: 224 MMHG

## 2018-12-12 DIAGNOSIS — Z79.899 ENCOUNTER FOR LONG-TERM (CURRENT) USE OF HIGH-RISK MEDICATION: Primary | ICD-10-CM

## 2018-12-12 NOTE — PROGRESS NOTES
Nursing Notes    Patient presents to the office today in follow-up. Patient rates her pain at 8/10 on the numerical pain scale. Reviewed medications with counts as follows:    Rx Date filled Qty Dispensed Pill Count Last Dose Short   Fentanyl 25mcg 12/05/18 10 7 12/11/18 No    Percocet 10/325mg  12/05/18 120 92 Today  Yes ( one day short )                                   Last opioid agreement 06/2018  Last urine drug screen 11/2018    Comments: Patient noted to be short on medications , percocet 10/325mg ( one day short)  this office visit; advised to follow dosing schedule as prescribed by [facility]. B/p elevated; provider made aware; patient is asymptomatic and states that she has taken her medications today. POC UDS was not performed in office today    Any new labs or imaging since last appointment? NO    Have you been to an emergency room (ER) or urgent care clinic since your last visit? NO            Have you been hospitalized since your last visit? NO     If yes, where, when, and reason for visit? Have you seen or consulted any other health care providers outside of the [address] [place] [address]  since your last visit? YES     If yes, where, when, and reason for visit? Neurology   Ms. Harini Dangelo has a reminder for a \"due or due soon\" health maintenance. I have asked that she contact her primary care provider for follow-up on this health maintenance.     PHQ over the last two weeks 12/12/2018   PHQ Not Done -   Little interest or pleasure in doing things Several days   Feeling down, depressed, irritable, or hopeless Not at all   Total Score PHQ 2 1

## 2018-12-12 NOTE — PROGRESS NOTES
Due to elevated b/p , patient has been advised to f/u with emergency dept instead of following through with appt scheduled for today; patient agrees, however patient has declined \"405\" transport; patient advises that she is here with her stepmother today and that she will have her stepmother drive her to emergency dept; patient continues to present asymptomatically and denies any symptomology ; patient states that she will follow up with emergency dept.; provider and  aware.

## 2018-12-19 ENCOUNTER — OFFICE VISIT (OUTPATIENT)
Dept: PAIN MANAGEMENT | Age: 58
End: 2018-12-19

## 2018-12-19 VITALS
WEIGHT: 224 LBS | TEMPERATURE: 98.6 F | SYSTOLIC BLOOD PRESSURE: 151 MMHG | BODY MASS INDEX: 31.36 KG/M2 | RESPIRATION RATE: 22 BRPM | DIASTOLIC BLOOD PRESSURE: 80 MMHG | HEIGHT: 71 IN | OXYGEN SATURATION: 92 % | HEART RATE: 46 BPM

## 2018-12-19 DIAGNOSIS — G89.4 CHRONIC PAIN SYNDROME: ICD-10-CM

## 2018-12-19 DIAGNOSIS — M54.12 BRACHIAL NEURITIS: ICD-10-CM

## 2018-12-19 DIAGNOSIS — M54.81 BILATERAL OCCIPITAL NEURALGIA: ICD-10-CM

## 2018-12-19 DIAGNOSIS — M54.2 CERVICALGIA: ICD-10-CM

## 2018-12-19 DIAGNOSIS — G24.3 SPASMODIC TORTICOLLIS: ICD-10-CM

## 2018-12-19 DIAGNOSIS — M54.12 CERVICAL RADICULOPATHY: ICD-10-CM

## 2018-12-19 DIAGNOSIS — M48.02 SPINAL STENOSIS IN CERVICAL REGION: ICD-10-CM

## 2018-12-19 DIAGNOSIS — M47.22 OSTEOARTHRITIS OF SPINE WITH RADICULOPATHY, CERVICAL REGION: ICD-10-CM

## 2018-12-19 RX ORDER — FENTANYL 12.5 UG/1
1 PATCH TRANSDERMAL
Qty: 10 PATCH | Refills: 0 | Status: SHIPPED | OUTPATIENT
Start: 2019-01-03 | End: 2019-02-01 | Stop reason: DRUGHIGH

## 2018-12-19 RX ORDER — CYCLOBENZAPRINE HCL 5 MG
5 TABLET ORAL
Qty: 90 TAB | Refills: 1 | Status: SHIPPED | OUTPATIENT
Start: 2018-12-19 | End: 2019-07-30 | Stop reason: SDUPTHER

## 2018-12-19 RX ORDER — OXYCODONE AND ACETAMINOPHEN 10; 325 MG/1; MG/1
1 TABLET ORAL
Qty: 120 TAB | Refills: 0 | Status: SHIPPED | OUTPATIENT
Start: 2019-01-03 | End: 2019-02-01

## 2018-12-19 RX ORDER — DULOXETIN HYDROCHLORIDE 30 MG/1
30 CAPSULE, DELAYED RELEASE ORAL DAILY
Qty: 30 CAP | Refills: 1 | Status: SHIPPED | OUTPATIENT
Start: 2018-12-19 | End: 2019-02-01

## 2018-12-19 NOTE — PROGRESS NOTES
Internal Medicine  Progress Note  Today's Date:  2018   Patient:  Thomas Jenkins  Patient :  1960    Subjective:     Chief Complaint   Patient presents with    Neck Pain    Shoulder Pain       HPI: Thomas Jenkins is a 60 y.o.  female who presents for follow up. Pt last seen 11/15 by me. She is tolerating the wean. She states she has an Ortho appointment early next month. She is tolerating her Gabapentin. Still having significant neck pain. Pain 4/10 with medication 8/10 without. Pt recently started on Buspar and is being weaned of Xanax. Pt frustrated with how long it's been since her last set of injections which she says have helped a great deal. She is marked anxious over further reduction in her medication until other modalities for treating her pain are successful. Past Medical History:   Diagnosis Date    Brachial neuritis or radiculitis NOS     Calculus of kidney     Cervicalgia     Degeneration of cervical intervertebral disc     GERD (gastroesophageal reflux disease)     Hypertension     Ill-defined condition     Gout    Pancreatitis     Thyroid cancer ([facility] [address]      Past Surgical History:   Procedure Laterality Date    HX BREAST BIOPSY      Negative    HX CHOLECYSTECTOMY      HX HYSTERECTOMY      HX LITHOTRIPSY      HX OOPHORECTOMY      HX THYROIDECTOMY      Partial thyroidectomy       REVIEW OF SYSTEMS:   Constitutional  no wt loss, night sweats, unexplained fevers  Oral  no mouth pain, tongue or tooth problems, no swallowing problems   Cardiac  no CP, PND, orthopnea, palpitations or syncope  Resp  no wheezing, chronic coughing, dyspnea  GI  no heartburn, nausea, vomiting,constipation, diarrhea,bleeding.   no dysuria, hematuria, urgency, frequency  Ortho  as noted above  Derm  no  rashes, lesions.   Psych  positive for anxiety and depression symptoms, no hallucinations, suicidal, or violent ideation  Neuro  no focal weakness,incoordination, ataxia, involuntary movements  Endo - no polyuria, polydipsia, nocturia, hot flashes        Calculated MEQ - 120  Naloxone rescue - yes  Prophylactic bowel program - yes  PHQ-9- 2  COMM SCORE- 2  PHQ over the last two weeks 12/19/2018   PHQ Not Done -   Little interest or pleasure in doing things Not at all   Feeling down, depressed, irritable, or hopeless Not at all   Total Score PHQ 2 0         MD Kimberly López [facility] [phone]    Current Outpatient Meds and Allergies     Current Outpatient Medications on File Prior to Visit   Medication Sig Dispense Refill    cloNIDine HCl (CATAPRES) 0.1 mg tablet Take  by mouth two (2) times a day.  hydrALAZINE (APRESOLINE) 50 mg tablet Take 25 mg by mouth three (3) times daily.  ezetimibe (ZETIA) 10 mg tablet Take 10 mg by mouth daily. 0    naloxone 4 mg/actuation spry 4 mg by Nasal route once as needed (opioid overdose) for up to 1 dose. May substitute 2 mg syringe if insurance requires 1 Package 0    rosuvastatin (CRESTOR) 10 mg tablet Take 10 mg by mouth nightly.  ALPRAZolam (XANAX) 0.5 mg tablet Take 1 mg by mouth three (3) times daily as needed. Take 1- 1/2 tabs tid prn      levothyroxine (SYNTHROID) 175 mcg tablet Take 175 mcg by mouth Daily (before breakfast).  gabapentin (NEURONTIN) 300 mg capsule 1 tab on day one, 1 tab bid on days two and three, then 1 tab tid on day four and beyond. 90 Cap 0    [DISCONTINUED] fentaNYL (DURAGESIC) 25 mcg/hr PATCH 1 Patch by TransDERmal route every seventy-two (72) hours. Max Daily Amount: 1 Patch. 10 Patch 0    [DISCONTINUED] oxyCODONE-acetaminophen (PERCOCET)  mg per tablet Take 1 Tab by mouth every six (6) hours as needed for Pain for up to 30 days. Max Daily Amount: 4 Tabs. 120 Tab 0    [DISCONTINUED] fentaNYL (DURAGESIC) 100 mcg/hr PATCH 1 Patch by TransDERmal route every fourty-eight (48) hours for 30 days.  Max Daily Amount: 1 Patch. Apply along with 12 mcg patch to equal 112 mcg for chronic, severe, refractory pain. Valle brands only  Indications: Chronic Pain with Opioid Tolerance, SEVERE PAIN WITH OPIOID TOLERANCE 15 Patch 0    fentaNYL (DURAGESIC) 12 mcg/hr patch 1 Patch by TransDERmal route every fourty-eight (48) hours for 30 days. Max Daily Amount: 1 Patch. Apply along with 100 mcg patch for chronic, severe, refractory pain. Valle brands only 15 Patch 0    naproxen (NAPROSYN) 500 mg tablet Take 500 mg by mouth daily (with breakfast).  lidocaine (LIDODERM) 5 % 1 Patch by TransDERmal route every twenty-four (24) hours. Apply patch to the affected area for 12 hours a day and remove for 12 hours a day. 1 Each 0    naloxegol (MOVANTIK) 25 mg tab tablet Take 1 Tab by mouth daily. Take daily before breakfast for opioid induced constipation  Indications: OPIOID-INDUCED CONSTIPATION 30 Tab 11    Transparent Dressings (TEGADERM) 3 1/2 X 4 \" bndg 2 Patches by Apply Externally route every fourty-eight (48) hours. Dispense two boxes 50 Each 11    amLODIPine (NORVASC) 10 mg tablet Take  by mouth daily.  ergocalciferol (ERGOCALCIFEROL) 50,000 unit capsule Take 1 Cap by mouth as directed. Once  a week 8 Cap 5    TENS Units (TENS 502) Deepa 1 Units by Does Not Apply route as directed. 1 Units 0     No current facility-administered medications on file prior to visit. Allergies   Allergen Reactions    Oxycontin [Oxycodone] Not Reported This Time     Pt states not allergic to oxycontin            Objective:       BP Readings from Last 3 Encounters:   12/19/18 151/80   12/12/18 (!) 224/95   11/15/18 (!) 199/103     VS:    Visit Vitals  /80   Pulse (!) 46   Temp 98.6 °F (37 °C) (Oral)   Resp 22   Ht 5' 11\" (1.803 m)   Wt 101.6 kg (224 lb)   SpO2 92%   BMI 31.24 kg/m²       Body mass index is 31.24 kg/m². GENERAL: W/D, W/N,Female in no acute distress. APPEARANCE: Well groomed, Appropriately Dressed.   ATTITUDE: Pleasant, Cooperative, Guarded. SPEECH: Normal Rate, Clear. AFFECT: Appropriate, Sad, Worried,Tearful. MOOD: Dysthymic  THOUGHT PROCESS: Linear, Logical, Normal Judgement and Insight  THOUGHT CONTENT: Normal  MEMORY: Grossly Intact. HEENT -- NCAT, Anicteric sclerae. Mus/Skel--  bulk and tone appear normal.  Derm-- no obvious abnormalities noted. Results for orders placed or performed in visit on 11/15/18   AMB POC DRUG SCREEN ()   Result Value Ref Range    ALCOHOL UR POC      AMPHETAMINES UR POC Negative     CARISOPRODOL UR POC      COCAINE UR POC Negative     FENTANYL UR POC      MDMA/ECSTASY UR POC Negative     METHADONE UR POC Negative     METHAMPHETAMINE UR POC Negative     METHYLPHENIDATE UR POC      OPIATES UR POC Negative     OXYCODONE UR POC Presumptive Positive     PHENCYCLIDINE UR POC      PROPOXYPHENE UR POC      TRAMADOL UR POC      TRICYCLICS UR POC Negative     BARBITURATES UR POC Negative     BENZODIAZEPINES UR POC Negative     CANNABINOIDS UR POC Negative     BUPRENORPHINE UR POC Negative        Assessment/Plan & Orders:     I have reviewed this patient's report generated by the [place] which does not demonstrate aberrancies and inconsistencies with regard to the historical prescribing of controlled medications to this patient by other providers.     Problem List Items Addressed This Visit     Bilateral occipital neuralgia    Relevant Medications    fentaNYL (DURAGESIC) 12 mcg/hr patch (Start on 1/3/2019)    oxyCODONE-acetaminophen (PERCOCET)  mg per tablet (Start on 1/3/2019)    DULoxetine (CYMBALTA) 30 mg capsule    cyclobenzaprine (FLEXERIL) 5 mg tablet    Brachial neuritis    Relevant Medications    fentaNYL (DURAGESIC) 12 mcg/hr patch (Start on 1/3/2019)    oxyCODONE-acetaminophen (PERCOCET)  mg per tablet (Start on 1/3/2019)    DULoxetine (CYMBALTA) 30 mg capsule    cyclobenzaprine (FLEXERIL) 5 mg tablet    Cervical radiculopathy Relevant Medications    fentaNYL (DURAGESIC) 12 mcg/hr patch (Start on 1/3/2019)    oxyCODONE-acetaminophen (PERCOCET)  mg per tablet (Start on 1/3/2019)    DULoxetine (CYMBALTA) 30 mg capsule    cyclobenzaprine (FLEXERIL) 5 mg tablet    Cervicalgia    Relevant Medications    fentaNYL (DURAGESIC) 12 mcg/hr patch (Start on 1/3/2019)    oxyCODONE-acetaminophen (PERCOCET)  mg per tablet (Start on 1/3/2019)    DULoxetine (CYMBALTA) 30 mg capsule    cyclobenzaprine (FLEXERIL) 5 mg tablet    Chronic pain syndrome    Relevant Medications    oxyCODONE-acetaminophen (PERCOCET)  mg per tablet (Start on 1/3/2019)    DULoxetine (CYMBALTA) 30 mg capsule    Osteoarthritis of spine with radiculopathy, cervical region    Relevant Medications    fentaNYL (DURAGESIC) 12 mcg/hr patch (Start on 1/3/2019)    oxyCODONE-acetaminophen (PERCOCET)  mg per tablet (Start on 1/3/2019)    DULoxetine (CYMBALTA) 30 mg capsule    cyclobenzaprine (FLEXERIL) 5 mg tablet    Spasmodic torticollis    Relevant Medications    fentaNYL (DURAGESIC) 12 mcg/hr patch (Start on 1/3/2019)    oxyCODONE-acetaminophen (PERCOCET)  mg per tablet (Start on 1/3/2019)    DULoxetine (CYMBALTA) 30 mg capsule    cyclobenzaprine (FLEXERIL) 5 mg tablet    Spinal stenosis in cervical region    Relevant Medications    oxyCODONE-acetaminophen (PERCOCET)  mg per tablet (Start on 1/3/2019)          Diagnoses and all orders for this visit:    1. Cervicalgia  -     fentaNYL (DURAGESIC) 12 mcg/hr patch; 1 Patch by TransDERmal route every seventy-two (72) hours. Max Daily Amount: 1 Patch.  -     oxyCODONE-acetaminophen (PERCOCET)  mg per tablet; Take 1 Tab by mouth every six (6) hours as needed for Pain for up to 30 days. Max Daily Amount: 4 Tabs. -     DULoxetine (CYMBALTA) 30 mg capsule; Take 1 Cap by mouth daily. -     cyclobenzaprine (FLEXERIL) 5 mg tablet; Take 1 Tab by mouth three (3) times daily as needed for Muscle Spasm(s).     2. Brachial neuritis  -     oxyCODONE-acetaminophen (PERCOCET)  mg per tablet; Take 1 Tab by mouth every six (6) hours as needed for Pain for up to 30 days. Max Daily Amount: 4 Tabs. -     DULoxetine (CYMBALTA) 30 mg capsule; Take 1 Cap by mouth daily. 3. Spinal stenosis in cervical region  -     oxyCODONE-acetaminophen (PERCOCET)  mg per tablet; Take 1 Tab by mouth every six (6) hours as needed for Pain for up to 30 days. Max Daily Amount: 4 Tabs. 4. Bilateral occipital neuralgia  -     fentaNYL (DURAGESIC) 12 mcg/hr patch; 1 Patch by TransDERmal route every seventy-two (72) hours. Max Daily Amount: 1 Patch.  -     oxyCODONE-acetaminophen (PERCOCET)  mg per tablet; Take 1 Tab by mouth every six (6) hours as needed for Pain for up to 30 days. Max Daily Amount: 4 Tabs. -     DULoxetine (CYMBALTA) 30 mg capsule; Take 1 Cap by mouth daily. -     cyclobenzaprine (FLEXERIL) 5 mg tablet; Take 1 Tab by mouth three (3) times daily as needed for Muscle Spasm(s). 5. Spasmodic torticollis  -     oxyCODONE-acetaminophen (PERCOCET)  mg per tablet; Take 1 Tab by mouth every six (6) hours as needed for Pain for up to 30 days. Max Daily Amount: 4 Tabs. -     cyclobenzaprine (FLEXERIL) 5 mg tablet; Take 1 Tab by mouth three (3) times daily as needed for Muscle Spasm(s). 6. Osteoarthritis of spine with radiculopathy, cervical region  -     fentaNYL (DURAGESIC) 12 mcg/hr patch; 1 Patch by TransDERmal route every seventy-two (72) hours. Max Daily Amount: 1 Patch.  -     oxyCODONE-acetaminophen (PERCOCET)  mg per tablet; Take 1 Tab by mouth every six (6) hours as needed for Pain for up to 30 days. Max Daily Amount: 4 Tabs. 7. Chronic pain syndrome  -     oxyCODONE-acetaminophen (PERCOCET)  mg per tablet; Take 1 Tab by mouth every six (6) hours as needed for Pain for up to 30 days. Max Daily Amount: 4 Tabs. -     DULoxetine (CYMBALTA) 30 mg capsule; Take 1 Cap by mouth daily.     8. Cervical radiculopathy  -     fentaNYL (DURAGESIC) 12 mcg/hr patch; 1 Patch by TransDERmal route every seventy-two (72) hours. Max Daily Amount: 1 Patch.  -     oxyCODONE-acetaminophen (PERCOCET)  mg per tablet; Take 1 Tab by mouth every six (6) hours as needed for Pain for up to 30 days. Max Daily Amount: 4 Tabs. -     DULoxetine (CYMBALTA) 30 mg capsule; Take 1 Cap by mouth daily. MME =  85   Post reduction in medication. Follow-up Disposition:  Return in about 6 weeks (around 2/1/2019) for medication re-evaluation, evaluation for further weaning of medication. Reviewed with patient the treatment plan, goals of treatment plan, and limitations of treatment plan, to include the potential for side effects from medications and procedures. If side effects occur, it is the responsibility of the patient to inform the clinic so that a change in the treatment plan can be made in a safe manner. The patient is advised that stopping prescribed medication may cause an increase in symptoms and possible medication withdrawal symptoms. The patient is informed an emergency room evaluation may be necessary if this occurs. Had lengthy discussion with Pt regarding the direction of this facility away from opioids being the primary treatment option, and the need for exhaustion all other potential options to address her pain    GOALS:  To establish complementary and integrative plan of care to address chronic pain issues while minimizing pharmaceuticals to maximize patient's function improve quality of life.     Education:  Patient again educated on the importance of strict compliance with the opioid care agreement while on opioid therapy. Patient also again educated that they should avoid driving while on chronic opioid therapy. Also advised to avoid alcohol and to avoid benzodiazepines while on opioid therapy. Patient verbalized understanding and is in agreement with treatment plan as outlined above. All questions answered.         Lo Holt MD

## 2018-12-19 NOTE — PROGRESS NOTES
Nursing Notes    Patient presents to the office today in follow-up. Patient rates her pain at 8/10 on the numerical pain scale. Reviewed medications with counts as follows:    Rx Date filled Qty Dispensed Pill Count Last Dose Short   Fentanyl 25mcg 12/05/18 10 4 Today  No    Percocet 10/325mg  12/05/18 120 62 Today  No                                 Last opioid agreement 06/2018  Last urine drug screen 11/2018    Comments:     POC UDS was not performed in office today    Any new labs or imaging since last appointment? NO    Have you been to an emergency room (ER) or urgent care clinic since your last visit? NO            Have you been hospitalized since your last visit? NO     If yes, where, when, and reason for visit? Have you seen or consulted any other health care providers outside of the [address] [place] [address]  since your last visit? YES     If yes, where, when, and reason for visit? cardiology  Ms. Jordan Calderon has a reminder for a \"due or due soon\" health maintenance. I have asked that she contact her primary care provider for follow-up on this health maintenance.     PHQ over the last two weeks 12/19/2018   PHQ Not Done -   Little interest or pleasure in doing things Not at all   Feeling down, depressed, irritable, or hopeless Not at all   Total Score PHQ 2 0

## 2018-12-27 DIAGNOSIS — M47.22 OSTEOARTHRITIS OF SPINE WITH RADICULOPATHY, CERVICAL REGION: ICD-10-CM

## 2018-12-27 DIAGNOSIS — G89.4 CHRONIC PAIN SYNDROME: ICD-10-CM

## 2018-12-27 DIAGNOSIS — M54.12 CERVICAL RADICULOPATHY: ICD-10-CM

## 2018-12-27 DIAGNOSIS — M48.02 SPINAL STENOSIS IN CERVICAL REGION: ICD-10-CM

## 2018-12-27 DIAGNOSIS — M54.81 BILATERAL OCCIPITAL NEURALGIA: ICD-10-CM

## 2018-12-27 DIAGNOSIS — G24.3 SPASMODIC TORTICOLLIS: ICD-10-CM

## 2018-12-27 DIAGNOSIS — M54.2 CERVICALGIA: ICD-10-CM

## 2018-12-27 DIAGNOSIS — M54.12 BRACHIAL NEURITIS: ICD-10-CM

## 2018-12-27 RX ORDER — OXYCODONE AND ACETAMINOPHEN 10; 325 MG/1; MG/1
1 TABLET ORAL
Qty: 120 TAB | Refills: 0 | Status: CANCELLED | OUTPATIENT
Start: 2019-01-03 | End: 2019-02-02

## 2018-12-27 RX ORDER — FENTANYL 12.5 UG/1
1 PATCH TRANSDERMAL
Qty: 10 PATCH | Refills: 0 | Status: CANCELLED | OUTPATIENT
Start: 2019-01-03

## 2018-12-27 NOTE — TELEPHONE ENCOUNTER
Sophy Mares has called requesting a refill of their controlled medication, fentanyl and percocet, for the management of chronic generalized pain. Last office visit date: 12/2018  Last opioid care agreement 06/2018  Last UDS was done 11/2018    Date last  was pulled and reviewed : 12/27/18    Was the patient compliant when the above report was pulled? yes    Analgesia: 50% pain relief noted    Aberrancies: none noted     ADL's: Patient is able to complete adl care. Adverse Reaction: none noted    Provider's last note and plan of care reviewed? yes  Request forwarded to provider for review. 01/2019 prescriptions pre-printed by provider ; last uds in 11/2018 ; last controlled substance agreement in 06/2018; narcan in 06/2017.

## 2018-12-27 NOTE — TELEPHONE ENCOUNTER
Patient called about medication refill(Percocet and Fentanyl)    50%  Yes  No    Please give patient a call [phone]

## 2019-01-09 ENCOUNTER — OFFICE VISIT (OUTPATIENT)
Dept: ORTHOPEDIC SURGERY | Age: 59
End: 2019-01-09

## 2019-01-09 VITALS
WEIGHT: 230.8 LBS | BODY MASS INDEX: 32.31 KG/M2 | TEMPERATURE: 98.1 F | RESPIRATION RATE: 16 BRPM | SYSTOLIC BLOOD PRESSURE: 158 MMHG | DIASTOLIC BLOOD PRESSURE: 88 MMHG | HEART RATE: 62 BPM | HEIGHT: 71 IN | OXYGEN SATURATION: 100 %

## 2019-01-09 DIAGNOSIS — M47.812 SPONDYLOSIS OF CERVICAL REGION WITHOUT MYELOPATHY OR RADICULOPATHY: ICD-10-CM

## 2019-01-09 DIAGNOSIS — M54.2 CERVICAL PAIN: ICD-10-CM

## 2019-01-09 DIAGNOSIS — M79.18 CERVICAL MYOFASCIAL PAIN SYNDROME: Primary | ICD-10-CM

## 2019-01-09 RX ORDER — BETAMETHASONE SODIUM PHOSPHATE AND BETAMETHASONE ACETATE 3; 3 MG/ML; MG/ML
12 INJECTION, SUSPENSION INTRA-ARTICULAR; INTRALESIONAL; INTRAMUSCULAR; SOFT TISSUE ONCE
Qty: 2 ML | Refills: 0
Start: 2019-01-09 | End: 2019-01-09

## 2019-01-09 RX ORDER — BUPIVACAINE HYDROCHLORIDE 2.5 MG/ML
0.5 INJECTION, SOLUTION INFILTRATION; PERINEURAL ONCE
Qty: 0.5 ML | Refills: 0
Start: 2019-01-09 | End: 2019-01-09

## 2019-01-09 RX ORDER — LIDOCAINE HYDROCHLORIDE 20 MG/ML
0.5 INJECTION, SOLUTION EPIDURAL; INFILTRATION; INTRACAUDAL; PERINEURAL ONCE
Qty: 0.5 ML | Refills: 0
Start: 2019-01-09 | End: 2019-01-09

## 2019-01-09 NOTE — PROGRESS NOTES
Verbal order entered per Dr. Rey Lies as documented on blue sheet:Bilateral Upper Trap-Trigger point injection, 2 ml celestone, 0.5 ml lidocaine, 0.5 ml marcaine

## 2019-01-09 NOTE — PROGRESS NOTES
[address]  [address], [address]  [place], [address] [place] [address] Street  Phone: [phone]  Fax: [phone]        Yadira Pineda  : 1960  PCP: Johanna Singh MD      NEW PATIENT      ASSESSMENT AND PLAN     Diagnoses and all orders for this visit:    1. Cervical myofascial pain syndrome  -     bupivacaine (MARCAINE) 0.25 % (2.5 mg/mL) soln injection; 0.5 mL by IntraMUSCular route once for 1 dose. -     INJECTION, BUPIVICAINE HYDRO  -     LIDOCAINE INJECTION  -     lidocaine, PF, (XYLOCAINE) 20 mg/mL (2 %) injection; 0.5 mL by Other route once for 1 dose. -     betamethasone (CELESTONE SOLUSPAN) 6 mg/mL injection; 2 mL by IntraMUSCular route once for 1 dose.  -     BETAMETHASONE ACETATE & SODIUM PHOSPHATE INJECTION 3 MG EA.  -     HI INJECT TRIGGER POINT, 1 OR 2    2. Spondylosis of cervical region without myelopathy or radiculopathy    3. Cervical pain  -     AMB POC XRAY, SPINE, CERVICAL; 2 OR 3       1. Advised to stay active as tolerated. No progressive myeloradiculopathy noted on exam today. Needs postural re-education, declines PT, given HEP. 2. Given list of PM practices. Continue w/[facility] for C MARILOU/occipital blocks  3. No indications for surgery at this time. 4. B/L upper trapezii TPI done today  5. Given information on upper back and cervical exercises    Follow-up Disposition:  Return if symptoms worsen or fail to improve. CHIEF COMPLAINT  Romeo Espinoza is seen today in consultation at the request of Dr. Geetha Choi for complaints of neck and shoulder pain. HISTORY OF PRESENT ILLNESS  Romeo Espinoza is a 60 y.o. female. Today pt c/o neck and shoulder pain of multiple year duration. Pt has had trauma that caused pain. Work related injury from lifting in . Pt in PM with [facility], they are weaning her medications.     Pt states the injections have been beneficial. She states she has been switching doctors at pain management which is stressful and not helpful. She notes her pain has increased since her pain medications have been reduced. She admits to cervicogenic headaches. Was patient of Dr. Elva Matias for many years. She was under the impression that she was to receive an MARILOU here. Previous MARILOU Dr. Sara Martines with benefit. 4/2018    Location of pain: neck L>R  Does pain radiate into extremities: no   Does patient have weakness: no   Pt denies saddle paresthesias. Medications pt is on: through PM. Denies persistent fevers, chills, weight changes, neurogenic bowel or bladder symptoms. Pt denies any recent fevers, chills, antibiotics, recent cortisone injections, or infections. Treatments patient has tried:  Physical therapy:Yes years ago  HEP: Yes  Non-opioid medications: Yes Failed Gabapentin  Spinal injections: Yes last C7-T1 MARILOU 4/2018 Dr. Sara Martines with benefit. Previously recieiving occiptal nerve blocks through PM.  Spinal surgery- No.   Last C MRI 2016: degenerative changes. no stenosis.  reviewed. PMHx of thyroid cancer, gout, pancreatitits. ED 11/7/18 for chest pain. ED 11/5/18 for headache and diarrhea. Pt covered by . Pt lives in [place]. Pain Assessment  1/9/2019   Location of Pain Neck; Shoulder   Location Modifiers Left;Right   Severity of Pain 8   Quality of Pain Throbbing; Other (Comment)   Quality of Pain Comment N/T all over   Duration of Pain Persistent   Frequency of Pain Constant   Aggravating Factors Other (Comment)   Aggravating Factors Comment Looking down and sitting   Limiting Behavior Some   Relieving Factors Other (Comment)   Relieving Factors Comment Laying down and TENS unit   Result of Injury Yes   Work-Related Injury Yes         MRI cervical spine 2016  IMPRESSION: Mainly posterior lateral corner disc protrusion and  facet/uncovertebral hypertrophy with foraminal stenosis as described. Posterior  disc disease most prominent at C4-C5 and C5-C6.  No significant central stenosis  or cord compression.       PAST MEDICAL HISTORY   Past Medical History:   Diagnosis Date    Brachial neuritis or radiculitis NOS     Calculus of kidney     Cervicalgia     Degeneration of cervical intervertebral disc     GERD (gastroesophageal reflux disease)     Hypertension     Ill-defined condition     Gout    Pancreatitis 2012    Thyroid cancer ([facility] [address]        Past Surgical History:   Procedure Laterality Date    HX BREAST BIOPSY      Negative    HX CHOLECYSTECTOMY      HX HYSTERECTOMY      HX LITHOTRIPSY      HX OOPHORECTOMY      HX THYROIDECTOMY      Partial thyroidectomy       MEDICATIONS      Current Outpatient Medications   Medication Sig Dispense Refill    fentaNYL (DURAGESIC) 12 mcg/hr patch 1 Patch by TransDERmal route every seventy-two (72) hours. Max Daily Amount: 1 Patch. 10 Patch 0    oxyCODONE-acetaminophen (PERCOCET)  mg per tablet Take 1 Tab by mouth every six (6) hours as needed for Pain for up to 30 days. Max Daily Amount: 4 Tabs. 120 Tab 0    DULoxetine (CYMBALTA) 30 mg capsule Take 1 Cap by mouth daily. 30 Cap 1    cyclobenzaprine (FLEXERIL) 5 mg tablet Take 1 Tab by mouth three (3) times daily as needed for Muscle Spasm(s). 90 Tab 1    cloNIDine HCl (CATAPRES) 0.1 mg tablet Take  by mouth two (2) times a day.  hydrALAZINE (APRESOLINE) 50 mg tablet Take 25 mg by mouth three (3) times daily.  gabapentin (NEURONTIN) 300 mg capsule 1 tab on day one, 1 tab bid on days two and three, then 1 tab tid on day four and beyond. 90 Cap 0    ezetimibe (ZETIA) 10 mg tablet Take 10 mg by mouth daily. 0    naloxone 4 mg/actuation spry 4 mg by Nasal route once as needed (opioid overdose) for up to 1 dose. May substitute 2 mg syringe if insurance requires 1 Package 0    naloxegol (MOVANTIK) 25 mg tab tablet Take 1 Tab by mouth daily.  Take daily before breakfast for opioid induced constipation  Indications: OPIOID-INDUCED CONSTIPATION 30 Tab 11    Transparent Dressings (TEGADERM) 3 1/2 X 4 \" bndg 2 Patches by Apply Externally route every fourty-eight (48) hours. Dispense two boxes 50 Each 11    rosuvastatin (CRESTOR) 10 mg tablet Take 10 mg by mouth nightly.  ALPRAZolam (XANAX) 0.5 mg tablet Take 1 mg by mouth three (3) times daily as needed. Take 1- 1/2 tabs tid prn      TENS Units (TENS 502) Deepa 1 Units by Does Not Apply route as directed. 1 Units 0    levothyroxine (SYNTHROID) 175 mcg tablet Take 175 mcg by mouth Daily (before breakfast).  fentaNYL (DURAGESIC) 12 mcg/hr patch 1 Patch by TransDERmal route every fourty-eight (48) hours for 30 days. Max Daily Amount: 1 Patch. Apply along with 100 mcg patch for chronic, severe, refractory pain. Valle brands only 15 Patch 0    naproxen (NAPROSYN) 500 mg tablet Take 500 mg by mouth daily (with breakfast).  lidocaine (LIDODERM) 5 % 1 Patch by TransDERmal route every twenty-four (24) hours. Apply patch to the affected area for 12 hours a day and remove for 12 hours a day. 1 Each 0    amLODIPine (NORVASC) 10 mg tablet Take  by mouth daily.  ergocalciferol (ERGOCALCIFEROL) 50,000 unit capsule Take 1 Cap by mouth as directed.  Once  a week 8 Cap 5       ALLERGIES    Allergies   Allergen Reactions    Gabapentin Other (comments)     Body numbness/tingling    Oxycontin [Oxycodone] Not Reported This Time     Pt states not allergic to oxycontin            SOCIAL HISTORY    Social History     Socioeconomic History    Marital status:      Spouse name: Not on file    Number of children: Not on file    Years of education: Not on file    Highest education level: Not on file   Social Needs    Financial resource strain: Not on file    Food insecurity - worry: Not on file    Food insecurity - inability: Not on file   [employer] needs - medical: Not on file   [employer] needs - non-medical: Not on file   Occupational History    Not on file   Tobacco Use    Smoking status: Never Smoker    Smokeless tobacco: Never Used   Substance and Sexual Activity    Alcohol use: No    Drug use: No    Sexual activity: Yes     Birth control/protection: Surgical     Comment: Hysterectomy   Other Topics Concern    Not on file   Social History Narrative    Not on file       FAMILY HISTORY    Family History   Problem Relation Age of Onset    Diabetes Mother     Breast Cancer Mother     Heart Attack Other     Diabetes Sister     Diabetes Brother          REVIEW OF SYSTEMS  Review of Systems   Constitutional: Negative for chills, fever and weight loss. Respiratory: Negative for shortness of breath. Cardiovascular: Negative for chest pain. Gastrointestinal: Negative for constipation. Negative for fecal incontinence   Genitourinary: Negative for dysuria. Negative for urinary incontinence   Musculoskeletal:        Per HPI   Skin: Negative for rash. Neurological: Positive for focal weakness and headaches. Negative for dizziness, tingling and tremors. Endo/Heme/Allergies: Does not bruise/bleed easily. Psychiatric/Behavioral: The patient does not have insomnia. PHYSICAL EXAMINATION  Visit Vitals  /88   Pulse 62   Temp 98.1 °F (36.7 °C)   Resp 16   Ht 5' 11\" (1.803 m)   Wt 230 lb 12.8 oz (104.7 kg)   SpO2 100%   BMI 32.19 kg/m²          Accompanied by self. Constitutional:  Well developed, well nourished, in no acute distress. Psychiatric: Affect and mood are appropriate. Integumentary: No rashes or abrasions noted on exposed areas. Cardiovascular/Peripheral Vascular: Intact l pulses. No peripheral edema is noted. Lymphatic:  No evidence of lymphedema. No cervical lymphadenopathy. SPINE/MUSCULOSKELETAL EXAM    Cervical spine:  Neck is midline. Normal muscle tone. No focal atrophy is noted. Rounded shoulders. Tenderness to palpation B/L occipital notch, B/L upper trapezii, B/L medial scapular border. Negative Spurling's sign. Negative Tinel's sign. Negative Loya's sign. Sensation grossly intact to light touch. MOTOR:      Biceps  Triceps Deltoids Wrist Ext Wrist Flex Hand Intrin   Right +4/5 +4/5 +4/5 +4/5 +4/5 4/5   Left +4/5 +4/5 +4/5 +4/5 +4/5 4/5       DTRs are 1+ biceps, triceps, brachioradialis, patella, and Achilles. Tandem gait intact      Ambulation without assistive device. FWB. FF neck. RADIOGRAPHS  Cervical spine xray films reviewed:  1) forward flexed       [facility]  OFFICE PROCEDURE PROGRESS NOTE      PROCEDURE: In the office today after informed consent using aseptic technique, the patient was injected with a total of 2 cc of Celestone, 0.5 cc each of Lidocaine and Marcaine into her bilateral upper trapezii trigger point. Chart reviewed for the following:   I, Dr. Washington Martinez, have reviewed the History, Physical and updated the Allergic reactions for [address] [place] [address] performed immediately prior to start of procedure:   IDr. Washington, have performed the following reviews on Edison Bolaños prior to the start of the procedure:            * Patient was identified by name and date of birth   * Agreement on procedure being performed was verified  * Risks and Benefits explained to the patient  * Procedure site verified and marked as necessary  * Patient was positioned for comfort  * Consent was signed and verified     Time: 3:08 PM     Date of procedure: 1/11/2019    Procedure performed by:  Kike Santana MD    Provider assisted by: None    Patient assisted by: Self    How tolerated by patient: Pt tolerated the procedure well with no complications. Written by Julio Cesar Smith, as dictated by Washington Martinez MD.    Dr. Washington ALEMAN MD, confirm that all documentation is accurate. Ms. Vikram Walters may have a reminder for a \"due or due soon\" health maintenance. I have asked that she contact her primary care provider for follow-up on this health maintenance.

## 2019-01-09 NOTE — PATIENT INSTRUCTIONS
Healthy Upper Back: Exercises  Your Care Instructions  Here are some examples of exercises for your upper back. Start each exercise slowly. Ease off the exercise if you start to have pain. Your doctor or physical therapist will tell you when you can start these exercises and which ones will work best for you. How to do the exercises  Lower neck and upper back stretch    1. Stretch your arms out in front of your body. Clasp one hand on top of your other hand. 2. Gently reach out so that you feel your shoulder blades stretching away from each other. 3. Gently bend your head forward. 4. Hold for 15 to 30 seconds. 5. Repeat 2 to 4 times. Midback stretch    1. Kneel on the floor, and sit back on your ankles. 2. Lean forward, place your hands on the floor, and stretch your arms out in front of you. Rest your head between your arms. 3. Gently push your chest toward the floor, reaching as far in front of you as possible. 4. Hold for 15 to 30 seconds. 5. Repeat 2 to 4 times. Shoulder rolls    1. Sit comfortably with your feet shoulder-width apart. You can also do this exercise while standing. 2. Roll your shoulders up, then back, and then down in a smooth, circular motion. 3. Repeat 2 to 4 times. Wall push-up    1. Stand against a wall with your feet about 12 to 24 inches back from the wall. If you feel any pain when you do this exercise, stand closer to the wall. 2. Place your hands on the wall slightly wider apart than your shoulders, and lean forward. 3. Gently lean your body toward the wall. Then push back to your starting position. Keep the motion smooth and controlled. 4. Repeat 8 to 12 times. Resisted shoulder blade squeeze    1. Sit or stand, holding the band in both hands in front of you. Keep your elbows close to your sides, bent at a 90-degree angle. Your palms should face up. 2. Squeeze your shoulder blades together, and move your arms to the outside, stretching the band.  Be sure to keep your elbows at your sides while you do this. 3. Relax. 4. Repeat 8 to 12 times. Resisted rows    1. Put the band around a solid object, such as a bedpost, at about waist level. Hold one end of the band in each hand. 2. With your elbows at your sides and bent to 90 degrees, pull the band back to move your shoulder blades toward each other. Return to the starting position. 3. Repeat 8 to 12 times. Follow-up care is a key part of your treatment and safety. Be sure to make and go to all appointments, and call your doctor if you are having problems. It's also a good idea to know your test results and keep a list of the medicines you take. Where can you learn more? Go to http://angie-nile.info/. Enter B705 in the search box to learn more about \"Healthy Upper Back: Exercises. \"  Current as of: November 29, 2017  Content Version: 11.8  © 3131-9101 [employer]. Care instructions adapted under license by [employer] (which disclaims liability or warranty for this information). If you have questions about a medical condition or this instruction, always ask your healthcare professional. [place] [address] any warranty or liability for your use of this information. Neck: Exercises  Your Care Instructions  Here are some examples of typical rehabilitation exercises for your condition. Start each exercise slowly. Ease off the exercise if you start to have pain. Your doctor or physical therapist will tell you when you can start these exercises and which ones will work best for you. How to do the exercises  Neck stretch    6. This stretch works best if you keep your shoulder down as you lean away from it. To help you remember to do this, start by relaxing your shoulders and lightly holding on to your thighs or your chair. 7. Tilt your head toward your shoulder and hold for 15 to 30 seconds.  Let the weight of your head stretch your muscles. 8. If you would like a little added stretch, use your hand to gently and steadily pull your head toward your shoulder. For example, keeping your right shoulder down, lean your head to the left. 9. Repeat 2 to 4 times toward each shoulder. Diagonal neck stretch    6. Turn your head slightly toward the direction you will be stretching, and tilt your head diagonally toward your chest and hold for 15 to 30 seconds. 7. If you would like a little added stretch, use your hand to gently and steadily pull your head forward on the diagonal.  8. Repeat 2 to 4 times toward each side. Dorsal glide stretch    4. Sit or stand tall and look straight ahead. 5. Slowly tuck your chin as you glide your head backward over your body  6. Hold for a count of 6, and then relax for up to 10 seconds. 7. Repeat 8 to 12 times. Chest and shoulder stretch    5. Sit or stand tall and glide your head backward as in the dorsal glide stretch. 6. Raise both arms so that your hands are next to your ears. 7. Take a deep breath, and as you breathe out, lower your elbows down and behind your back. You will feel your shoulder blades slide down and together, and at the same time you will feel a stretch across your chest and the front of your shoulders. 8. Hold for about 6 seconds, and then relax for up to 10 seconds. 9. Repeat 8 to 12 times. Strengthening: Hands on head    5. Move your head backward, forward, and side to side against gentle pressure from your hands, holding each position for about 6 seconds. 6. Repeat 8 to 12 times. Follow-up care is a key part of your treatment and safety. Be sure to make and go to all appointments, and call your doctor if you are having problems. It's also a good idea to know your test results and keep a list of the medicines you take. Where can you learn more? Go to http://angie-nile.info/.   Enter P975 in the search box to learn more about \"Neck: Exercises. \"  Current as of: November 29, 2017  Content Version: 11.8  © 8584-5612 [employer], [facility]. Care instructions adapted under license by [employer] (which disclaims liability or warranty for this information). If you have questions about a medical condition or this instruction, always ask your healthcare professional. [place] [address] any warranty or liability for your use of this information.

## 2019-02-01 ENCOUNTER — OFFICE VISIT (OUTPATIENT)
Dept: PAIN MANAGEMENT | Age: 59
End: 2019-02-01

## 2019-02-01 VITALS
RESPIRATION RATE: 20 BRPM | BODY MASS INDEX: 32.2 KG/M2 | SYSTOLIC BLOOD PRESSURE: 163 MMHG | TEMPERATURE: 97.8 F | WEIGHT: 230 LBS | HEIGHT: 71 IN | OXYGEN SATURATION: 99 % | HEART RATE: 65 BPM | DIASTOLIC BLOOD PRESSURE: 94 MMHG

## 2019-02-01 DIAGNOSIS — M47.22 OSTEOARTHRITIS OF SPINE WITH RADICULOPATHY, CERVICAL REGION: ICD-10-CM

## 2019-02-01 DIAGNOSIS — M50.30 DEGENERATION OF CERVICAL INTERVERTEBRAL DISC: ICD-10-CM

## 2019-02-01 DIAGNOSIS — M48.02 SPINAL STENOSIS IN CERVICAL REGION: ICD-10-CM

## 2019-02-01 DIAGNOSIS — M54.2 CERVICALGIA: ICD-10-CM

## 2019-02-01 DIAGNOSIS — M54.12 CERVICAL RADICULOPATHY: ICD-10-CM

## 2019-02-01 DIAGNOSIS — Z79.899 ENCOUNTER FOR LONG-TERM (CURRENT) USE OF HIGH-RISK MEDICATION: Primary | ICD-10-CM

## 2019-02-01 RX ORDER — OXYCODONE AND ACETAMINOPHEN 10; 325 MG/1; MG/1
TABLET ORAL
Qty: 150 TAB | Refills: 0 | Status: SHIPPED | OUTPATIENT
Start: 2019-03-02 | End: 2019-04-02 | Stop reason: SDUPTHER

## 2019-02-01 RX ORDER — DULOXETIN HYDROCHLORIDE 30 MG/1
30 CAPSULE, DELAYED RELEASE ORAL 2 TIMES DAILY
Qty: 60 CAP | Refills: 2 | Status: SHIPPED | OUTPATIENT
Start: 2019-02-01 | End: 2019-04-25 | Stop reason: SDUPTHER

## 2019-02-01 RX ORDER — OXYCODONE AND ACETAMINOPHEN 10; 325 MG/1; MG/1
TABLET ORAL
Qty: 150 TAB | Refills: 0 | Status: SHIPPED | OUTPATIENT
Start: 2019-02-02 | End: 2019-02-19 | Stop reason: SDUPTHER

## 2019-02-01 NOTE — PROGRESS NOTES
Nursing Notes Patient presents to the office today in follow-up. Patient rates her pain at 7/10 on the numerical pain scale. Reviewed medications with counts as follows:   
Rx Date filled Qty Dispensed Pill Count Last Dose Short Percocet 10/325mg  01/02/19 120 3 This am  No   
Fentanyl 12mcg 01/02/19 10 0 01/31/19 No   
       
       
          
 
 
Last opioid agreement 06/2018 Last urine drug screen 11/2018 Comments: B/P elevated; patient asymptomatic; provider aware; patient states that she has taken b/p meds for today. POC UDS was not performed in office today Any new labs or imaging since last appointment? NO Have you been to an emergency room (ER) or urgent care clinic since your last visit? NO Have you been hospitalized since your last visit? NO If yes, where, when, and reason for visit? Have you seen or consulted any other health care providers outside of the [address] [place] [address]  since your last visit? NO If yes, where, when, and reason for visit? Ms. Sonia Dowd has a reminder for a \"due or due soon\" health maintenance. I have asked that she contact her primary care provider for follow-up on this health maintenance. PHQ over the last two weeks 2/1/2019 PHQ Not Done - Little interest or pleasure in doing things Not at all Feeling down, depressed, irritable, or hopeless Not at all Total Score PHQ 2 0

## 2019-02-19 DIAGNOSIS — M54.12 CERVICAL RADICULOPATHY: ICD-10-CM

## 2019-02-19 DIAGNOSIS — Z79.899 ENCOUNTER FOR LONG-TERM (CURRENT) USE OF HIGH-RISK MEDICATION: ICD-10-CM

## 2019-02-19 DIAGNOSIS — M54.2 CERVICALGIA: ICD-10-CM

## 2019-02-19 RX ORDER — OXYCODONE AND ACETAMINOPHEN 10; 325 MG/1; MG/1
TABLET ORAL
Qty: 30 TAB | Refills: 0 | Status: SHIPPED | OUTPATIENT
Start: 2019-02-19 | End: 2019-04-25 | Stop reason: SDUPTHER

## 2019-02-19 NOTE — TELEPHONE ENCOUNTER
Patient called the nurse triage line stating that their pharmacy only filled 120 tabs out of their 150 tabs Rx for their Percocet 10/325 mg tab. She stated the pharmacy is requesting a new Rx for remaining tablets. [facility] confirms that patient did fill Percocet with a #120 tabs for 24 days on 2/1/19. I then called [employer] [facility], and spoke to Vilma williamson, pharmacist. I informed them of the situation. She said if they didn't fill the full amount, it's usually because it's insurance related, but didn't see a note from the pharmacist that filled the Rx. She then called patient's Worker's Comp, and she informed me that the claim for 150 tabs is being worked on. Will route provider, , Rx request for remaining tablets to review.

## 2019-02-20 NOTE — TELEPHONE ENCOUNTER
Patient identified using two patient identifiers (name and ); patient advised prescription for reaming tablets are ready for pick-up. Patient verbalized understanding.

## 2019-02-28 NOTE — TELEPHONE ENCOUNTER
Patient left voice mail on triage line requesting a refill on her Percocet. Patient did not leave information necessary to process request.    Kassidy Hanley has called requesting a refill of their controlled medication, Percocet, for the management of chronic pain. Last office visit date: 2/1/19  Last opioid care agreement 11/15/18  Last UDS was done 5/21/18    Date last  was pulled and reviewed : 2/28/19  Last fill date for medication was 2/1/19    Was the patient compliant when the above report was pulled? no    Analgesia:  Filled Alprazolam 1mg #60 on 2/15/19    Aberrancies: No aberrancies noted in the last 30 days. ADL's: Patient states they are able to perform ADL's on current regimen. Adverse Reaction: Patient reports no adverse reactions at this time. Provider's last note and plan of care reviewed? yes  Request forwarded to provider for review.

## 2019-03-26 ENCOUNTER — TELEPHONE (OUTPATIENT)
Dept: PAIN MANAGEMENT | Age: 59
End: 2019-03-26

## 2019-03-26 NOTE — TELEPHONE ENCOUNTER
Attempted to contact patient regarding rescheduling of appt due to provider out of office ; no answer noted at number given; vm left to contact this office.

## 2019-04-02 ENCOUNTER — OFFICE VISIT (OUTPATIENT)
Dept: PAIN MANAGEMENT | Age: 59
End: 2019-04-02

## 2019-04-02 VITALS
DIASTOLIC BLOOD PRESSURE: 88 MMHG | RESPIRATION RATE: 14 BRPM | HEART RATE: 67 BPM | BODY MASS INDEX: 32.2 KG/M2 | SYSTOLIC BLOOD PRESSURE: 126 MMHG | WEIGHT: 230 LBS | OXYGEN SATURATION: 97 % | HEIGHT: 71 IN | TEMPERATURE: 97.7 F

## 2019-04-02 DIAGNOSIS — T40.2X5A CONSTIPATION DUE TO OPIOID THERAPY: ICD-10-CM

## 2019-04-02 DIAGNOSIS — Z79.899 ENCOUNTER FOR LONG-TERM (CURRENT) USE OF HIGH-RISK MEDICATION: ICD-10-CM

## 2019-04-02 DIAGNOSIS — M54.2 CERVICALGIA: Primary | ICD-10-CM

## 2019-04-02 DIAGNOSIS — M50.30 DEGENERATION OF CERVICAL INTERVERTEBRAL DISC: ICD-10-CM

## 2019-04-02 DIAGNOSIS — G89.4 CHRONIC PAIN SYNDROME: ICD-10-CM

## 2019-04-02 DIAGNOSIS — M47.22 OSTEOARTHRITIS OF SPINE WITH RADICULOPATHY, CERVICAL REGION: ICD-10-CM

## 2019-04-02 DIAGNOSIS — M54.12 CERVICAL RADICULOPATHY: ICD-10-CM

## 2019-04-02 DIAGNOSIS — M48.02 SPINAL STENOSIS IN CERVICAL REGION: ICD-10-CM

## 2019-04-02 DIAGNOSIS — K59.03 CONSTIPATION DUE TO OPIOID THERAPY: ICD-10-CM

## 2019-04-02 LAB
ALCOHOL UR POC: NORMAL
AMPHETAMINES UR POC: NEGATIVE
BARBITURATES UR POC: NORMAL
BENZODIAZEPINES UR POC: NORMAL
BUPRENORPHINE UR POC: NEGATIVE
CANNABINOIDS UR POC: NEGATIVE
CARISOPRODOL UR POC: NORMAL
COCAINE UR POC: NEGATIVE
FENTANYL UR POC: NORMAL
MDMA/ECSTASY UR POC: NORMAL
METHADONE UR POC: NEGATIVE
METHAMPHETAMINE UR POC: NORMAL
METHYLPHENIDATE UR POC: NORMAL
OPIATES UR POC: NORMAL
OXYCODONE UR POC: NORMAL
PHENCYCLIDINE UR POC: NORMAL
PROPOXYPHENE UR POC: NORMAL
TRAMADOL UR POC: NORMAL
TRICYCLICS UR POC: NORMAL

## 2019-04-02 RX ORDER — LIDOCAINE 50 MG/G
1 PATCH TOPICAL EVERY 24 HOURS
Qty: 30 PATCH | Refills: 2 | Status: SHIPPED | OUTPATIENT
Start: 2019-04-02 | End: 2019-04-25 | Stop reason: SDUPTHER

## 2019-04-02 RX ORDER — OXYCODONE AND ACETAMINOPHEN 10; 325 MG/1; MG/1
1 TABLET ORAL
Qty: 150 TAB | Refills: 0 | Status: SHIPPED | OUTPATIENT
Start: 2019-04-02 | End: 2019-04-25 | Stop reason: SDUPTHER

## 2019-04-02 RX ORDER — NALOXONE HYDROCHLORIDE 4 MG/.1ML
1 SPRAY NASAL
Qty: 1 EACH | Refills: 1 | Status: SHIPPED | OUTPATIENT
Start: 2019-04-02 | End: 2019-04-02

## 2019-04-02 RX ORDER — NAPROXEN 500 MG/1
500 TABLET ORAL
Qty: 30 TAB | Refills: 2 | Status: SHIPPED | OUTPATIENT
Start: 2019-04-02 | End: 2019-05-02

## 2019-04-02 RX ORDER — PREGABALIN 25 MG/1
25 CAPSULE ORAL 2 TIMES DAILY
Qty: 60 CAP | Refills: 0 | Status: SHIPPED | OUTPATIENT
Start: 2019-04-02 | End: 2019-04-25 | Stop reason: SDUPTHER

## 2019-04-02 NOTE — PATIENT INSTRUCTIONS

## 2019-04-02 NOTE — PROGRESS NOTES
Nursing Notes    Patient presents to the office today in follow-up. Patient rates her pain at 7/10 on the numerical pain scale. Reviewed medications with counts as follows:    Rx Date filled Qty Dispensed Pill Count Last Dose Short   Percocet 10 mg 03/01/19 150 0 Last pm no        reviewed YES  Any aberrancies noted on  NO  Last opioid agreement 05/21/18  Last urine drug screen 11/15/18    Comments:  Patient is here today for a follow up appt today for her chronic neck pain. She states her pain level today is a 7  PHQ 2 was done patient denies any depression. Xray done of her cervical spine. ER visit on Sunday she was having stress. Patient needs a new script for Movantik, she also needs a new script for Narcan. She would also like to try Lyrica, she also needs a refill Naproxen   She would also like Lidocaine patches       POC UDS was performed in office today per verbal order per [place]    Any new labs or imaging since last appointment? YES labs done at [facility]. Have you been to an emergency room (ER) or urgent care clinic since your last visit? YES     [facility]        Have you been hospitalized since your last visit? NO     If yes, where, when, and reason for visit? Have you seen or consulted any other health care providers outside of the [address] [place] [address]  since your last visit? [address]   If yes, where, when, and reason for visit? Ms. Kerry Martin has a reminder for a \"due or due soon\" health maintenance. I have asked that she contact her primary care provider for follow-up on this health maintenance.

## 2019-04-02 NOTE — PROGRESS NOTES
Referral date 4/30/2003, source Dr Bree Hayes and for neck pain. Calculated MEQ - 75  Naloxone rescue - ordered today  Prophylactic bowel program - Movantik  Date of last OCA 5/21/18  Last UDS today, consistent POC, pending confirmatory testing  Prior UDS 11/15/18, consistent    date checked today, findings consistent      Today  PGIC - 1 & 5  NELSY - did not complete  COMM - 3      HPI:  Ivan Manning is a 60 y.o. female here for f/u visit for ongoing evaluation of chronic neck pain related to a work-related injury on 4/21/1992. Pt was last seen here on 2/1/19 with Dr Migel Bailey. Pt denies interval changes on the character or distribution of pain. Pain is located posterior neck and bilateral trapezius region. The pain is described as throbbing and aching. Pain at its best is 5/10. Pain at its worse is 7-8/10. The pain is worsened by cold/rainy weather. Symptoms are improved by Cymbalta, Percocet, MARILOU in the past with Dr Alexis Britton and trigger point injections in the past with Dr Micheline Nicole. Gabapentin with negative side effects. Pt has never tried Lyrica and is interested in starting this today. Since last visit, she was seen in the ED on 3/31/19 regarding chest discomfort she thought was congestion and dizziness; she reports everything came back normal and she was sent home. She has an injection scheduled with Dr Aaron Loera on 4/12/19. ROS:  Reports chest pains, dizziness and anxiety. Denies fever, chills, nausea, vomiting, diarrhea, constipation, abdominal pain, shortness or breath/trouble breathing, weakness, trouble swallowing, changes in vision, changes in hearing, falls, bladder incontinence, bowel incontinence, depression, suicidal ideations, homicidal ideations or alcohol use. Opioid specific risk: concurrent benzo use, anxiety, insomnia, obesity, GERD.       Vitals:    04/02/19 1414   BP: 126/88   Pulse: 67   Resp: 14   Temp: 97.7 °F (36.5 °C)   SpO2: 97%   Weight: 104.3 kg (230 lb)   Height: 5' 11\" (1.803 m) PainSc:   7   PainLoc: Neck        Imaging:  MRI Cervical Spine WO Contrast 9/12/16  \"\"IMPRESSION: Mainly posterior lateral corner disc protrusion and facet/uncovertebral hypertrophy with foraminal stenosis as described. Posterior disc disease most prominent at C4-C5 and C5-C6. No significant central stenosis or cord compression. \"\"      Physical exam:  AFVSS, no acute distress, endomorphic body habitus. A&OXs 3. Normocephalic, atraumatic. Conjugate gaze, clear sclerae. Speech is clear and appropriate. Mood is appropriate and patient is cooperative. Gait is within functional limits. Balance is within functional limits. Non-labored breathing. TTP noted to left trapezius muscle. Primary Care Physician  [place] [address] [address] [facility]  [phone]      PHQ -- .  3 most recent [facility] Screens 4/2/2019   PHQ Not Done -   Little interest or pleasure in doing things Not at all   Feeling down, depressed, irritable, or hopeless Not at all   Total Score PHQ 2 0           Assessment/Plan:     ICD-10-CM ICD-9-CM    1. Cervicalgia M54.2 723.1 naproxen (NAPROSYN) 500 mg tablet      oxyCODONE-acetaminophen (PERCOCET)  mg per tablet      TENS Units (TENS 504) gladys      lidocaine (LIDODERM) 5 %      pregabalin (LYRICA) 25 mg capsule   2. Spinal stenosis in cervical region M48.02 723.0 naproxen (NAPROSYN) 500 mg tablet      oxyCODONE-acetaminophen (PERCOCET)  mg per tablet      TENS Units (TENS 504) gladys      lidocaine (LIDODERM) 5 %      pregabalin (LYRICA) 25 mg capsule   3. Cervical radiculopathy M54.12 723.4 naproxen (NAPROSYN) 500 mg tablet      oxyCODONE-acetaminophen (PERCOCET)  mg per tablet      TENS Units (TENS 504) gladys      lidocaine (LIDODERM) 5 %      pregabalin (LYRICA) 25 mg capsule   4.  Chronic pain syndrome G89.4 338.4 naproxen (NAPROSYN) 500 mg tablet      oxyCODONE-acetaminophen (PERCOCET)  mg per tablet      TENS Units (TENS 504) gladys lidocaine (LIDODERM) 5 %      pregabalin (LYRICA) 25 mg capsule   5. Osteoarthritis of spine with radiculopathy, cervical region M47.22 721.0 naproxen (NAPROSYN) 500 mg tablet      oxyCODONE-acetaminophen (PERCOCET)  mg per tablet      TENS Units (TENS 504) gladys      lidocaine (LIDODERM) 5 %      pregabalin (LYRICA) 25 mg capsule   6. Degeneration of cervical intervertebral disc M50.30 722.4 naproxen (NAPROSYN) 500 mg tablet      oxyCODONE-acetaminophen (PERCOCET)  mg per tablet      TENS Units (TENS 504) gladys      lidocaine (LIDODERM) 5 %      pregabalin (LYRICA) 25 mg capsule   7. Encounter for long-term (current) use of high-risk medication Z79.899 V58.69 DRUG SCREEN      AMB POC DRUG SCREEN ()      naloxone (NARCAN) 4 mg/actuation nasal spray   8. Constipation due to opioid therapy K59.03 564.09 naloxegol (MOVANTIK) 25 mg tab tablet    T40.2X5A E935.2         Do not recommend long term opioid therapy for this patient at this time for their chronic pain; the risks outweigh the potential benefits. Pt currently taking Percocet 10/325mg up to 5 times a day as needed with a total of 150 tabs to be budgeted over 30 days. Their MME is 75. She has successfully weaned off Fentanyl with last fill date of 1/2/19. Today, we will continue with this dosing and plan to the weaning of patients opioid medication with a goal of being opioid free, pending safety and compliance at next office visit. Pt instructed to call if they experience any signs of withdrawal (diarrhea, nausea, vomiting, sweating or chills, agitation, itching). At next office visit, the plan is to provide patient with Percocet 10/325mg up to 5 times a day as needed with a total of 140 tabs to be budgeted over 30 days. If patient has difficulty with the wean or difficulty with cravings we will consider referral to mental health for ongoing assessment and treatment for opioid use disorder.     Trial of Lyrica ordered today; pt will return in 1 month to discuss further titration up of this medication as tolerated. Continue Flexeril and Cymbalta as previously recommended. Movantik, Naproxen and Lidoderm patches refilled today. TENS unit ordered today; consider NexWave E-Stim device if unable to obtain or if ineffective. Narcan ordered today as hers has . Consider H-wave in home therapy trial in the future. Planned MARILOU with Dr Jean Graham on 19. Pt would also benefit from TPI to left trapezius muscle in the future. Follow up ongoing assessment and ongoing development of integrative and comprehensive plan of care for chronic pain. Goals: To establish complementary and integrative plan of care to address chronic pain issues while minimizing pharmaceuticals to maximize patient's function improve quality of life. Education:  Patient again educated on the importance of strict compliance with the opioid care agreement while on opioid therapy. Patient also again educated that they should avoid driving while on chronic opioid therapy. Also advised to avoid alcohol and to avoid benzodiazepines while on opioid therapy. Handouts given regarding opioid safety. Patient Homework:  Continue to independently investigate yoga for persons with chronic pain. Follow-up and Dispositions    · Return in about 1 month (around 2019) for 30 min.               Social History     Socioeconomic History    Marital status:      Spouse name: Not on file    Number of children: Not on file    Years of education: Not on file    Highest education level: Not on file   Occupational History    Not on file   Social Needs    Financial resource strain: Not on file    Food insecurity:     Worry: Not on file     Inability: Not on file    Transportation needs:     Medical: Not on file     Non-medical: Not on file   Tobacco Use    Smoking status: Never Smoker    Smokeless tobacco: Never Used   Substance and Sexual Activity    Alcohol use: No    Drug use: No    Sexual activity: Yes     Birth control/protection: Surgical     Comment: Hysterectomy   Lifestyle    Physical activity:     Days per week: Not on file     Minutes per session: Not on file    Stress: Not on file   Relationships    Social connections:     Talks on phone: Not on file     Gets together: Not on file     Attends Holiness service: Not on file     Active member of club or organization: Not on file     Attends meetings of clubs or organizations: Not on file     Relationship status: Not on file    Intimate partner violence:     Fear of current or ex partner: Not on file     Emotionally abused: Not on file     Physically abused: Not on file     Forced sexual activity: Not on file   Other Topics Concern    Not on file   Social History Narrative    Not on file     Family History   Problem Relation Age of Onset    Diabetes Mother     Breast Cancer Mother     Heart Attack Other     Diabetes Sister     Diabetes Brother      Allergies   Allergen Reactions    Gabapentin Other (comments)     Body numbness/tingling    Oxycontin [Oxycodone] Not Reported This Time     Pt states not allergic to oxycontin       Past Medical History:   Diagnosis Date    Brachial neuritis or radiculitis NOS     Calculus of kidney     Cervicalgia     Degeneration of cervical intervertebral disc     GERD (gastroesophageal reflux disease)     Hypertension     Ill-defined condition     Gout    Pancreatitis 2012    Thyroid cancer ([facility] [address]      Past Surgical History:   Procedure Laterality Date    HX BREAST BIOPSY      Negative    HX CHOLECYSTECTOMY      HX HYSTERECTOMY      HX LITHOTRIPSY      HX OOPHORECTOMY      HX THYROIDECTOMY      Partial thyroidectomy     Current Outpatient Medications on File Prior to Visit   Medication Sig    oxyCODONE-acetaminophen (PERCOCET 10)  mg per tablet 1 tab po 5 x daily prn    DULoxetine (CYMBALTA) 30 mg capsule Take 1 Cap by mouth two (2) times a day.     cyclobenzaprine (FLEXERIL) 5 mg tablet Take 1 Tab by mouth three (3) times daily as needed for Muscle Spasm(s).  cloNIDine HCl (CATAPRES) 0.1 mg tablet Take  by mouth two (2) times a day.  hydrALAZINE (APRESOLINE) 50 mg tablet Take 25 mg by mouth three (3) times daily.  ezetimibe (ZETIA) 10 mg tablet Take 10 mg by mouth daily.  amLODIPine (NORVASC) 10 mg tablet Take  by mouth daily.  rosuvastatin (CRESTOR) 10 mg tablet Take 10 mg by mouth nightly.  ergocalciferol (ERGOCALCIFEROL) 50,000 unit capsule Take 1 Cap by mouth as directed. Once  a week    levothyroxine (SYNTHROID) 175 mcg tablet Take 175 mcg by mouth Daily (before breakfast).  [DISCONTINUED] oxyCODONE-acetaminophen (PERCOCET)  mg per tablet 1 tab po 5 x daily prn    Transparent Dressings (TEGADERM) 3 1/2 X 4 \" bndg 2 Patches by Apply Externally route every fourty-eight (48) hours. Dispense two boxes    TENS Units (TENS 502) Deepa 1 Units by Does Not Apply route as directed. No current facility-administered medications on file prior to visit. [address] was used for portions of this report. Unintended errors may occur.

## 2019-04-12 ENCOUNTER — OFFICE VISIT (OUTPATIENT)
Dept: PAIN MANAGEMENT | Age: 59
End: 2019-04-12

## 2019-04-12 VITALS
BODY MASS INDEX: 32.2 KG/M2 | WEIGHT: 230 LBS | RESPIRATION RATE: 13 BRPM | TEMPERATURE: 98.2 F | OXYGEN SATURATION: 93 % | HEART RATE: 60 BPM | HEIGHT: 71 IN | SYSTOLIC BLOOD PRESSURE: 143 MMHG | DIASTOLIC BLOOD PRESSURE: 82 MMHG

## 2019-04-12 DIAGNOSIS — M54.12 CERVICAL RADICULOPATHY: Primary | ICD-10-CM

## 2019-04-12 DIAGNOSIS — M48.02 SPINAL STENOSIS IN CERVICAL REGION: ICD-10-CM

## 2019-04-12 DIAGNOSIS — M50.30 DEGENERATION OF CERVICAL INTERVERTEBRAL DISC: ICD-10-CM

## 2019-04-12 NOTE — PROGRESS NOTES
Referred  2003 from Dr. Shelly Tavarez for neck pain for a work-related injury Pt was last seen here on  April 2, 2019 Naloxone rescue -yes Date of last OCA  May 2018 Last UDS  April 2, 2019 GIC-1 and 5 
NELSY -52% COMM- 0 
  
HPI: 
Chris Singh  Is a 60 y.o. female here for f/u visit for ongoing evaluation of cervical pain which originated in work related injury when lifting heavy objects off of a shelf. Pt denies interval changes in the character or distribution of pain. Pain is located at in midline along the lumbar spine from the base of the skull to just past the cervicothoracic junction and bilaterally along the upper trapezius. The pain is described as a constant aching and pinching pain with popping and crepitus perceived within the spine. And ranges from 8 to 8/10. She had C7-T1 interlaminar epidural steroid injection done on April 11, 2018 and on July 18, 2017 and numerous times prior to that. ROS:Review of systems is negative for fever, chills, nausea, vomiting, diarrhea, constipation, shortness of breath, abdominal pain, focal weakness, trouble swallowing, acute changes in vision, acute changes in hearing, dizziness, falls, depression, anxiety, suicidal ideation, homicidal ideation, alcohol use. Review of systems positive for  chest pain which has undergone workup with primary care provider Imaging: Cerv MRI report from 9/12/16 showed,\"\"\"\"\"\"\"\"Sagittal and axial multisequence MR images of cervical spine were obtained.  
  
Normal cervical alignment. No compression fracture or pathologic marrow signal. 
No prevertebral soft tissue abnormalities. Craniocervical junction is normal. 
Spinal cord shows normal signal intensity morphology throughout. 
  
C2-C3: No disc herniation or central stenosis. No foraminal stenosis. 
  
C3-C4: Posterior lateral disc protrusion, worse on the right. Uncovertebral and 
facet hypertrophy. Severe right and moderate left foraminal stenosis.  
  
 C4-C5: Posterior lateral corner disc protrusion. Mild posterior disc bulge. No 
central stenosis or cord contact. Moderate bilateral foraminal stenosis. 
  
C5-C6: Similar findings of posterior disc bulge and posterior lateral corner 
disc protrusion. No central stenosis or cord contact. Moderate bilateral 
foraminal stenosis. 
  
C6-C7: No disc herniation or central stenosis. Facet and uncovertebral 
hypertrophy with right foraminal stenosis. Patent left foramen. 
  
C7-T1: No disc herniation or central stenosis. No foraminal stenosis. No central 
stenosis. 
  
IMPRESSION IMPRESSION: Mainly posterior lateral corner disc protrusion and 
facet/uncovertebral hypertrophy with foraminal stenosis as described. Posterior 
disc disease most prominent at C4-C5 and C5-C6. No significant central stenosis 
or cord compression. \"\"\"\"\"\"\"\"\" Visit Vitals /82 (BP 1 Location: Left arm, BP Patient Position: Sitting) Pulse 60 Temp 98.2 °F (36.8 °C) (Oral) Resp 13 Ht 5' 11\" (1.803 m) Wt 104.3 kg (230 lb) SpO2 93% BMI 32.08 kg/m² PE: 
AFVSS with elevated blood pressure, no acute distress, mesomorphic body habitus. A&OXs 3. Speech is clear and appropriate. Mood is pleasant and appropriate. Patient is cooperative. Active range of motion for cervical flexion is decreased by 10 % with mild reproduction of primary pain. Active range of motion for cervical extension is decreased by 50 % with mild reproduction of primary pain. Near full active range of motion for cervical rotation to the left without significant change in primary pain complaint. Active range of motion for cervical rotation to the right is reduced nearly 50% with reproduction of pain cervicothoracic junction. Strength for right upper extremity is 5/5 for shoulder abduction, elbow flexion, wrist extension, elbow extension, finger abduction, flexor digitorum profundus to digits 2 through 5. Strength for left upper extremity is 5/5 for shoulder abduction, elbow flexion, wrist extension, elbow extension, finger abduction, flexor digitorum profundus to digits 2 through 5. Muscle stretch reflexes for right upper extremity are 2+ for C5, C6, C7. Muscle stretch reflexes for left upper extremity are 2+ for C5, C6, C7. Negative Hoffmans on the right and the left. Gait is within functional limits. Balance is within functional limits. Sensation to light touch is intact for left C2-T1 and right C2-T1. There is tenderness to palpation with active trigger points along cervical paraspinals bilaterally, posterior scalenes bilaterally, bilateral upper trapezius, bilateral levator scapulae, bilateral thoracic paraspinals. Primary Care Physician 
Deena Barajas MD 
[facility] [address] [place] [address] 
[phone] PHQ -- . 
3 most recent PHQ Screens 4/12/2019 PHQ Not Done - Little interest or pleasure in doing things Not at all Feeling down, depressed, irritable, or hopeless Not at all Total Score PHQ 2 0 Assessment/Plan:  
Encounter Diagnoses ICD-10-CM ICD-9-CM 1. Cervical radiculopathy M54.12 723.4 2. Degeneration of cervical intervertebral disc M50.30 722.4 3. Spinal stenosis in cervical region M48.02 723.0  
 
--We will repeat the cervical epidural steroid injection with the injection site at T1/T2 as this location provided a much better window of epidural fat then the C7-T1 location. --Patient advised to obtain the book, \"the trigger point therapy workbook\" to used to treat her widespread myofascial pain in the cervical region. --We can provide trigger point injections to the cervical thoracic musculature as needed. --If there is any change in the character or distribution of pain in the cervical spine or upper extremities please request updated cervical MRI. --Follow-up as scheduled with Shmuel GOALS: 
 To establish complementary and integrative plan of care to address chronic pain issues while minimizing pharmaceuticals to maximize patient's function improve quality of life. Education: 
Patient again educated on the importance of strict compliance with the opioid care agreement while on opioid therapy. Patient also again educated that they should avoid driving while on chronic opioid therapy. Also advised to avoid alcohol and to avoid benzodiazepines while on opioid therapy. A total of 34 minutes were spent with the patient, of which more than half of the time was spent counseling and/or coordinating care. F/u:. Follow-up Disposition:

## 2019-04-12 NOTE — PROGRESS NOTES
Nursing Notes Patient presents to the office today for an injection consult with Dr. Don Caballero for her chronic neck pain. Patient rates her pain at 7/10 on the numerical pain scale.  reviewed YES Any aberrancies noted on  NO Last opioid agreement 05/21/18 Last urine drug screen 04/02/19 Comments: POC UDS was not performed in office today Any new labs or imaging since last appointment? YES. Pt had some labs and imaging done. Results are in care everywhere under [facility] Have you been to an emergency room (ER) or urgent care clinic since your last visit? YES. Pt went to the ER at the first of the month for not feeling good. Have you been hospitalized since your last visit? NO If yes, where, when, and reason for visit? Have you seen or consulted any other health care providers outside of the [address] [place] [address]  since your last visit? NO If yes, where, when, and reason for visit? Ms. Abel Mir has a reminder for a \"due or due soon\" health maintenance. I have asked that she contact her primary care provider for follow-up on this health maintenance. 3 most recent PHQ Screens 4/12/2019 PHQ Not Done - Little interest or pleasure in doing things Not at all Feeling down, depressed, irritable, or hopeless Not at all Total Score PHQ 2 0 The pt does not have an advanced medical directive, POA, or living will. She is not interested in discussing this today. Fall Risk Assessment, last 12 mths 4/12/2019 Able to walk? Yes Fall in past 12 months?  No

## 2019-04-25 ENCOUNTER — OFFICE VISIT (OUTPATIENT)
Dept: PAIN MANAGEMENT | Age: 59
End: 2019-04-25

## 2019-04-25 VITALS
TEMPERATURE: 98.6 F | DIASTOLIC BLOOD PRESSURE: 106 MMHG | RESPIRATION RATE: 16 BRPM | SYSTOLIC BLOOD PRESSURE: 195 MMHG | BODY MASS INDEX: 32.2 KG/M2 | HEART RATE: 76 BPM | WEIGHT: 230 LBS | OXYGEN SATURATION: 97 % | HEIGHT: 71 IN

## 2019-04-25 VITALS
DIASTOLIC BLOOD PRESSURE: 106 MMHG | RESPIRATION RATE: 16 BRPM | SYSTOLIC BLOOD PRESSURE: 195 MMHG | HEIGHT: 71 IN | WEIGHT: 230 LBS | OXYGEN SATURATION: 97 % | TEMPERATURE: 98.6 F | HEART RATE: 76 BPM | BODY MASS INDEX: 32.2 KG/M2

## 2019-04-25 DIAGNOSIS — M48.02 SPINAL STENOSIS IN CERVICAL REGION: ICD-10-CM

## 2019-04-25 DIAGNOSIS — M47.22 OSTEOARTHRITIS OF SPINE WITH RADICULOPATHY, CERVICAL REGION: ICD-10-CM

## 2019-04-25 DIAGNOSIS — M54.12 CERVICAL RADICULOPATHY: Primary | ICD-10-CM

## 2019-04-25 DIAGNOSIS — T40.2X5A CONSTIPATION DUE TO OPIOID THERAPY: ICD-10-CM

## 2019-04-25 DIAGNOSIS — Z79.899 ENCOUNTER FOR LONG-TERM (CURRENT) USE OF HIGH-RISK MEDICATION: ICD-10-CM

## 2019-04-25 DIAGNOSIS — M54.2 CERVICALGIA: ICD-10-CM

## 2019-04-25 DIAGNOSIS — G89.4 CHRONIC PAIN SYNDROME: ICD-10-CM

## 2019-04-25 DIAGNOSIS — M48.02 SPINAL STENOSIS IN CERVICAL REGION: Primary | ICD-10-CM

## 2019-04-25 DIAGNOSIS — M50.30 DEGENERATION OF CERVICAL INTERVERTEBRAL DISC: ICD-10-CM

## 2019-04-25 DIAGNOSIS — M79.10 MYALGIA: ICD-10-CM

## 2019-04-25 DIAGNOSIS — K59.03 CONSTIPATION DUE TO OPIOID THERAPY: ICD-10-CM

## 2019-04-25 RX ORDER — PREGABALIN 50 MG/1
50 CAPSULE ORAL 2 TIMES DAILY
Qty: 60 CAP | Refills: 0 | Status: SHIPPED | OUTPATIENT
Start: 2019-04-25 | End: 2019-07-30 | Stop reason: SDUPTHER

## 2019-04-25 RX ORDER — LIDOCAINE HYDROCHLORIDE 10 MG/ML
3 INJECTION INFILTRATION; PERINEURAL ONCE
Qty: 3 ML | Refills: 0
Start: 2019-04-25 | End: 2019-04-25

## 2019-04-25 RX ORDER — LIDOCAINE 50 MG/G
1 PATCH TOPICAL EVERY 24 HOURS
Qty: 30 PATCH | Refills: 2 | Status: SHIPPED | OUTPATIENT
Start: 2019-04-25 | End: 2019-07-30 | Stop reason: SDUPTHER

## 2019-04-25 RX ORDER — OXYCODONE AND ACETAMINOPHEN 10; 325 MG/1; MG/1
1 TABLET ORAL
Qty: 150 TAB | Refills: 0 | Status: SHIPPED | OUTPATIENT
Start: 2019-05-02 | End: 2019-05-24 | Stop reason: SDUPTHER

## 2019-04-25 RX ORDER — DULOXETIN HYDROCHLORIDE 30 MG/1
30 CAPSULE, DELAYED RELEASE ORAL 2 TIMES DAILY
Qty: 60 CAP | Refills: 2 | Status: SHIPPED | OUTPATIENT
Start: 2019-04-25 | End: 2019-07-30 | Stop reason: SDUPTHER

## 2019-04-25 NOTE — PROGRESS NOTES
Nursing Notes    Patient presents to the office today for trigger point injections for her chronic neck pain. Patient rates her pain at 8/10 on the numerical pain scale. See same day nurse's note.

## 2019-04-25 NOTE — PATIENT INSTRUCTIONS
Safe Use of Opioid Pain Medicine: Care Instructions  Your Care Instructions  Pain is your body's way of warning you that something is wrong. Pain feels different for everybody. Only you can describe your pain. A doctor can suggest or prescribe many types of medicines for pain. These range from over-the-counter medicines like acetaminophen (Tylenol) to powerful medicines called opioids. Examples of opioids are fentanyl, hydrocodone, morphine, and oxycodone. Heroin is an illegal opioid  Opioids are strong medicines. They can help you manage pain when you use them the right way. But if you misuse them, they can cause serious harm and even death. For these reasons, doctors are very careful about how they prescribe opioids. If you decide to take opioids, here are some things to remember. · Keep your doctor informed. You can get addicted to opioids. The risk is higher if you have a history of substance use. Your doctor will monitor you closely for signs of misuse and addiction and to figure out when you no longer need to take opioids. · Make a treatment plan. The goal of your plan is to be able to function and do the things you need to do, even if you still have some pain. You might be able to manage your pain with other non-opioid options like physical therapy, relaxation, or over-the-counter pain medicines. · Be aware of the side effects. Opioids can cause serious side effects, such as constipation, dry mouth, and nausea. And over time, you may need a higher dose to get pain relief. This is called tolerance. Your body also gets used to opioids. This is called physical dependence. If you suddenly stop taking them, you may have withdrawal symptoms. The doctor carefully considered what pain medicine is right for you. You may not have received opioids if your doctor was concerned about drug interactions or your safety, or if he or she had other concerns. It is best to have one doctor or clinic treat your pain. This way you will get the pain medicine that will help you the most. And a doctor will be able to watch for any problems that the medicine might cause. The doctor has checked you carefully, but problems can develop later. If you notice any problems or new symptoms,  get medical treatment right away. Follow-up care is a key part of your treatment and safety. Be sure to make and go to all appointments, and call your doctor if you are having problems. It's also a good idea to know your test results and keep a list of the medicines you take. How can you care for yourself at home? If you need to take opioids to manage your pain, remember these safety tips. · Follow directions carefully. It's easy to misuse opioids if you take a dose other than what's prescribed by your doctor. This can lead to overdose and even death. Even sharing them with someone they weren't meant for is misuse. · Be cautious. Opioids may affect your judgment and decision making. Do not drive or operate machinery until you can think clearly. Talk with your doctor about when it is safe to drive. · Reduce the risk of drug interactions. Opioids can be dangerous if you take them with alcohol or with certain drugs like sleeping pills and muscle relaxers. Make sure your doctor knows about all the other medicines you take, including over-the-counter medicines. Don't start any new medicines before you talk to your doctor or pharmacist.  · Safely store and dispose of opioids. Store opioids in a safe and secure place. Make sure that pets, children, friends, and family can't get to them. When you're done using opioids, make sure to dispose of them safely and as quickly as possible. The U.S. Food and Drug Administration (FDA) recommends these disposal options. ? The best option is to take your medicine to a drop-off box or take-back program that is authorized by the [address] ([facility]).   ? If these programs aren't available in your area and your medicine doesn't have specific disposal instructions (such as flushing), you can throw them into your household trash if you follow the FDA's instructions. Visit fda.gov and search for \"unused medicine disposal.\"  ? If you have opioid patches (used or unused), your options are to take them to a [facility]-authorized site or flush them down the toilet. Do not throw them in the trash. ? Only flush your medicine down the toilet if you can't get to a [employer]-approved site or your medicine instructions state clearly to flush them. · Reduce the risk of overdose. Misuse of opioids can be very dangerous. Protect yourself by asking your doctor about a naloxone rescue kit. It can help you--and even save your life--if you take too much of an opioid. Try other ways to reduce pain. · Relax, and reduce stress. Relaxation techniques such as deep breathing or meditation can help. · Keep moving. Gentle, daily exercise can help reduce pain over the long run. Try low- or no-impact exercises such as walking, swimming, and stationary biking. Do stretches to stay flexible. · Try heat, cold packs, and massage. · Get enough sleep. Pain can make you tired and drain your energy. Talk with your doctor if you have trouble sleeping because of pain. · Think positive. Your thoughts can affect your pain level. Do things that you enjoy to distract yourself when you have pain instead of focusing on the pain. See a movie, read a book, listen to music, or spend time with a friend. If you are not taking a prescription pain medicine, ask your doctor if you can take an over-the-counter medicine. When should you call for help?   Call your doctor now or seek immediate medical care if:    · You have a new kind of pain.     · You have new symptoms, such as a fever or rash, along with the pain.    Watch closely for changes in your health, and be sure to contact your doctor if:    · You think you might be using too much pain medicine, and you need help to use less or stop.     · Your pain gets worse.     · You would like a referral to a doctor or clinic that specializes in pain management. Where can you learn more? Go to http://angie-nile.info/. Enter R108 in the search box to learn more about \"Safe Use of Opioid Pain Medicine: Care Instructions. \"  Current as of: Antonella 3, 2018  Content Version: 11.9  © 1878-6411 [employer]. Care instructions adapted under license by [employer] (which disclaims liability or warranty for this information). If you have questions about a medical condition or this instruction, always ask your healthcare professional. [address] any warranty or liability for your use of this information. High Blood Pressure: Care Instructions  Overview    It's normal for blood pressure to go up and down throughout the day. But if it stays up, you have high blood pressure. Another name for high blood pressure is hypertension. Despite what a lot of people think, high blood pressure usually doesn't cause headaches or make you feel dizzy or lightheaded. It usually has no symptoms. But it does increase your risk of stroke, heart attack, and other problems. You and your doctor will talk about your risks of these problems based on your blood pressure. Your doctor will give you a goal for your blood pressure. Your goal will be based on your health and your age. Lifestyle changes, such as eating healthy and being active, are always important to help lower blood pressure. You might also take medicine to reach your blood pressure goal.  Follow-up care is a key part of your treatment and safety. Be sure to make and go to all appointments, and call your doctor if you are having problems. It's also a good idea to know your test results and keep a list of the medicines you take. How can you care for yourself at home?   Medical treatment  · If you stop taking your medicine, your blood pressure will go back up. You may take one or more types of medicine to lower your blood pressure. Be safe with medicines. Take your medicine exactly as prescribed. Call your doctor if you think you are having a problem with your medicine. · Talk to your doctor before you start taking aspirin every day. Aspirin can help certain people lower their risk of a heart attack or stroke. But taking aspirin isn't right for everyone, because it can cause serious bleeding. · See your doctor regularly. You may need to see the doctor more often at first or until your blood pressure comes down. · If you are taking blood pressure medicine, talk to your doctor before you take decongestants or anti-inflammatory medicine, such as ibuprofen. Some of these medicines can raise blood pressure. · Learn how to check your blood pressure at home. Lifestyle changes  · Stay at a healthy weight. This is especially important if you put on weight around the waist. Losing even 10 pounds can help you lower your blood pressure. · If your doctor recommends it, get more exercise. Walking is a good choice. Bit by bit, increase the amount you walk every day. Try for at least 30 minutes on most days of the week. You also may want to swim, bike, or do other activities. · Avoid or limit alcohol. Talk to your doctor about whether you can drink any alcohol. · Try to limit how much sodium you eat to less than 2,300 milligrams (mg) a day. Your doctor may ask you to try to eat less than 1,500 mg a day. · Eat plenty of fruits (such as bananas and oranges), vegetables, legumes, whole grains, and low-fat dairy products. · Lower the amount of saturated fat in your diet. Saturated fat is found in animal products such as milk, cheese, and meat. Limiting these foods may help you lose weight and also lower your risk for heart disease. · Do not smoke. Smoking increases your risk for heart attack and stroke.  If you need help quitting, talk to your doctor about stop-smoking programs and medicines. These can increase your chances of quitting for good. When should you call for help? Call 911 anytime you think you may need emergency care. This may mean having symptoms that suggest that your blood pressure is causing a serious heart or blood vessel problem. Your blood pressure may be over 180/120.   For example, call 911 if:    · You have symptoms of a heart attack. These may include:  ? Chest pain or pressure, or a strange feeling in the chest.  ? Sweating. ? Shortness of breath. ? Nausea or vomiting. ? Pain, pressure, or a strange feeling in the back, neck, jaw, or upper belly or in one or both shoulders or arms. ? Lightheadedness or sudden weakness. ? A fast or irregular heartbeat.     · You have symptoms of a stroke. These may include:  ? Sudden numbness, tingling, weakness, or loss of movement in your face, arm, or leg, especially on only one side of your body. ? Sudden vision changes. ? Sudden trouble speaking. ? Sudden confusion or trouble understanding simple statements. ? Sudden problems with walking or balance. ? A sudden, severe headache that is different from past headaches.     · You have severe back or belly pain.    Do not wait until your blood pressure comes down on its own. Get help right away.   Call your doctor now or seek immediate care if:    · Your blood pressure is much higher than normal (such as 180/120 or higher), but you don't have symptoms.     · You think high blood pressure is causing symptoms, such as:  ? Severe headache.  ? Blurry vision.    Watch closely for changes in your health, and be sure to contact your doctor if:    · Your blood pressure measures higher than your doctor recommends at least 2 times. That means the top number is higher or the bottom number is higher, or both.     · You think you may be having side effects from your blood pressure medicine. Where can you learn more?   Go to http://angie-nile.info/. Enter O569 in the search box to learn more about \"High Blood Pressure: Care Instructions. \"  Current as of: July 22, 2018  Content Version: 11.9  © 4081-0254 [employer]. Care instructions adapted under license by [employer] (which disclaims liability or warranty for this information). If you have questions about a medical condition or this instruction, always ask your healthcare professional. [address] any warranty or liability for your use of this information.

## 2019-04-25 NOTE — PATIENT INSTRUCTIONS
Post Injection Instructions    If you develop any abnormal symptoms, such as itching, swelling, redness, rash, or shortness of breath, please call our office at . Normally, these are temporary symptoms, which resolve within several hours to a day, but our office is more than happy to answer any questions you may have. The provider would like you to observe the injection area for redness, swelling, or increased heat. If any or all of these reactions occur, please call our office as soon as possible. This reaction may indicate the first signs of infection. Although very rare, it is  best if caught early. I have reviewed these instructions and the patient verbalizes understanding. Lizette Luz

## 2019-04-25 NOTE — PROGRESS NOTES
HPI:  Francis Wong is a 60 y.o. female here for regular follow-up for chronic neck pain and active trigger points. Patient is reporting an acute exacerbation of her chronic neck pain. She denies any changes in the character or distribution of her pain just greater intensity. Allergies   Allergen Reactions    Gabapentin Other (comments)     Body numbness/tingling    Oxycontin [Oxycodone] Not Reported This Time     Pt states not allergic to oxycontin       Current Outpatient Medications on File Prior to Visit   Medication Sig    pregabalin (LYRICA) 50 mg capsule Take 1 Cap by mouth two (2) times a day for 30 days. Max Daily Amount: 100 mg.    [START ON 5/2/2019] oxyCODONE-acetaminophen (PERCOCET)  mg per tablet Take 1 Tab by mouth every four to six (4-6) hours as needed for Pain for up to 30 days. Max Daily Amount: 5 Tabs. Indications: Pain    DULoxetine (CYMBALTA) 30 mg capsule Take 1 Cap by mouth two (2) times a day for 30 days.  lidocaine (LIDODERM) 5 % 1 Patch by TransDERmal route every twenty-four (24) hours. Apply patch to the affected area for 12 hours a day and remove for 12 hours a day.  naloxegol (MOVANTIK) 25 mg tab tablet Take 1 Tab by mouth Daily (before breakfast) for 30 days. Take daily before breakfast for opioid induced constipation  Indications: Opiate Pain Medication causing Severe Constipation    naproxen (NAPROSYN) 500 mg tablet Take 1 Tab by mouth daily (with breakfast) for 30 days.  TENS Units (TENS 504) gladys Please provide patient TENS unit device and pads for chronic neck pain to help improve quality of life and functioning and to decrease pain and medication use.  cyclobenzaprine (FLEXERIL) 5 mg tablet Take 1 Tab by mouth three (3) times daily as needed for Muscle Spasm(s).  cloNIDine HCl (CATAPRES) 0.1 mg tablet Take 0.1 mg by mouth two (2) times a day.  hydrALAZINE (APRESOLINE) 50 mg tablet Take 25 mg by mouth three (3) times daily.     ezetimibe (ZETIA) 10 mg tablet Take 10 mg by mouth daily.  Transparent Dressings (TEGADERM) 3 1/2 X 4 \" bndg 2 Patches by Apply Externally route every fourty-eight (48) hours. Dispense two boxes    amLODIPine (NORVASC) 10 mg tablet Take 10 mg by mouth daily.  rosuvastatin (CRESTOR) 10 mg tablet Take 10 mg by mouth nightly.  ergocalciferol (ERGOCALCIFEROL) 50,000 unit capsule Take 1 Cap by mouth as directed. Once  a week    levothyroxine (SYNTHROID) 175 mcg tablet Take 175 mcg by mouth Daily (before breakfast). No current facility-administered medications on file prior to visit. ROS:  Negative for fever, chills, nausea, vomiting, diarrhea, constipation  Vitals:    04/25/19 1422   BP: (!) 195/106   Pulse: 76   Resp: 16   Temp: 98.6 °F (37 °C)   TempSrc: Oral   SpO2: 97%   Weight: 104.3 kg (230 lb)   Height: 5' 11\" (1.803 m)   PainSc:   8   PainLoc: Neck          PE:    AFVSS with elevated blood pressure, mild acute distress, normal body habitus. A&OXs 3.  normocephalic, atraumatic. Conjugate gaze, clear sclerae,  Breathing is nonlabored, lungs are clear to auscultation bilaterally. Heart has a regular rate and rhythm with positive S1 and S2. There was significant tenderness to palpation and activated trigger points located in bilateral upper trapezius region. .     Gait is within functional limits. Balance is within functional limits. Primary Care Physician  [place] [address] [address] [facility],[address]  [phone]    Assessment/Plan:     ICD-10-CM ICD-9-CM    1. Spinal stenosis in cervical region M48.02 723.0 lidocaine (XYLOCAINE) 10 mg/mL (1 %) injection      LIDOCAINE INJECTION      MO INJECT TRIGGER POINT, 1 OR 2   2.  Myalgia M79.10 729.1 lidocaine (XYLOCAINE) 10 mg/mL (1 %) injection      LIDOCAINE INJECTION      MO INJECT TRIGGER POINT, 1 OR 2      Patient presents today in acute exacerbation of her regular chronic pain and wishes to be considered for trigger point injection. Risks, benefits, alternatives were discussed and all questions were answered patient agrees to proceed with bilateral upper trapezius trigger point injections as discussed. Follow-up as needed for procedure related concerns. Maintain regularly scheduled office visit. Procedure Note  [facility]  OFFICE PROCEDURE PROGRESS NOTE        Chart reviewed for the following:   Valeria ALEMAN DO, have reviewed the History, Physical and updated the Allergic reactions for [address] [place] [address] performed immediately prior to start of procedure:   Valeria ALEMAN DO, have performed the following reviews on [place] prior to the start of the procedure:            * Patient was identified by name and date of birth   * Agreement on procedure being performed was verified  * Risks and Benefits explained to the patient  * Procedure site verified and marked as necessary  * Patient was positioned for comfort  * Consent was signed and verified     Time: 1430    Date of procedure: 4/29/2019    Procedure performed by:  Valeria Reynoso DO    Provider assisted by: Shmuel Lomeli LPN    Patient assisted by: self    How tolerated by patient: tolerated the procedure well with no complications    Post Procedural Pain Scale: See procedure note. Comments: none, See note for details. Procedure Note      Procedure Note  Trigger point injections  Bilateral upper trapezius    Consent was obtained. Timeout was performed. Patient was positioned seated with her head resting on the exam table. Sites were marked. Each injection site was pretreated with vapo coolant spray. Each injection site was injected with approximately 0.5 mL of 1% lidocaine following negative aspiration using a 25-gauge 1.5 inch needle. Patient tolerated procedure without complaints. There were no immediate complications.     Lungs were clear bilaterally to auscultation post procedure. Patient was observed for 10 minutes post procedure and discharged home in stable condition.

## 2019-04-25 NOTE — PROGRESS NOTES
Nursing Notes    Patient presents to the office today in follow-up. Patient rates her pain at 8/10 on the numerical pain scale. Reviewed medications with counts as follows:    Rx Date filled Qty Dispensed Pill Count Last Dose Short   Percocet 10 mg 04/02/19 150 30 This am  Yes 1 day        reviewed YES  Any aberrancies noted on  NO  Last opioid agreement 04/25/19  Last urine drug screen 04/25/19    Comments:  Patient is here today for a follow up appt today for her chronic neck pain. She states her pain level today is an 8  PHQ 2 was done patient denies any depression. She states she thought she was getting injections in her neck. She was not scheduled. Please advise   Patient has increased bp today. I will reassess on the other arm. bp is elevated I will reassess after the visit and will make the provider aware    POC UDS was performed in office today per verbal order per [place]    Any new labs or imaging since last appointment? NO    Have you been to an emergency room (ER) or urgent care clinic since your last visit? NO            Have you been hospitalized since your last visit? NO     If yes, where, when, and reason for visit? Have you seen or consulted any other health care providers outside of the [address] [place] [address]  since your last visit? NO     If yes, where, when, and reason for visit? Ms. Sandy Walton has a reminder for a \"due or due soon\" health maintenance. I have asked that she contact her primary care provider for follow-up on this health maintenance.

## 2019-05-01 NOTE — PROGRESS NOTES
Referral date 4/30/2003, source Dr Micheal Doe and for neck pain. Calculated MEQ - 75  Naloxone rescue - yes  Prophylactic bowel program - Movantik  Date of last OCA today  Last UDS 4/2/19, consistent POC, pending confirmatory testing  Prior UDS 11/15/18, consistent    date checked today, findings consistent      Today   Last Visit  NELSY - 0  PGIC - 1 & 5  1 & 5  NELSY - 58%  did not complete  COMM - 2  3      HPI:  Allan Holland is a 60 y.o. female here for f/u visit for ongoing evaluation of chronic neck pain related to a work-related injury on 4/21/1992. Pt was last seen here on 4/12/19. Pt denies interval changes on the character or distribution of pain but reports increased intensity since visit with Dr Madelin Aase on 4/12/19 as she feel like something was activated. Pain is located posterior neck and bilateral trapezius region. The pain is described as throbbing, aching and pinching. Pain at its best is 5/10. Pain at its worse is 8/10. The pain is worsened by cold/rainy weather. Symptoms are improved by Cymbalta, Percocet, MARILOU in the past with Dr Nandini Hackett and trigger point injections in the past with Dr Benito Haas. Gabapentin with negative side effects. Pt has never tried Lyrica. Since last visit, Lyrica with no issues. TENS unit is currently \"being mailed\" to her. She has an upcoming procedure with Dr Madelin Aase waiting on workers comp preauthorization. ROS:  Reports anxiety. Denies fever, chills, nausea, vomiting, diarrhea, constipation, chest pain, shortness or breath/trouble breathing, abdominal pain, weakness, trouble swallowing, changes in vision, changes in hearing, falls, dizziness, bladder incontinence, bowel incontinence, depression, suicidal ideations, homicidal ideations or alcohol use. Opioid specific risk: concurrent benzo use, anxiety, insomnia, obesity, GERD.       Vitals:    04/25/19 1300 04/25/19 1309 04/25/19 1351   BP: (!) 201/99 (!) 210/108 (!) 195/106   Pulse: 84 86 76   Resp: 16     Temp: 98.6 °F (37 °C)     SpO2: 97%     Weight: 104.3 kg (230 lb)     Height: 5' 11\" (1.803 m)     PainSc:   8     PainLoc: Neck         Physical exam:  AFVSS with elevated blood pressure, mild acute distress due to pain, endomorphic body habitus. A&OXs 3. Normocephalic, atraumatic. Conjugate gaze, clear sclerae. Speech is clear and appropriate. Mood is appropriate and patient is cooperative. Gait is within functional limits. Balance is within functional limits. Non-labored breathing. Lungs are clear to auscultation bilaterally. Heart with RRR and positive S1 and S2. Significant TTP and activated trigger points located in bilateral upper trapezius region. Primary Care Physician  [address] [facility]  [phone]      PHQ -- .  3 most recent [place] [address] Screens 4/25/2019   PHQ Not Done -   Little interest or pleasure in doing things Not at all   Feeling down, depressed, irritable, or hopeless Not at all   Total Score PHQ 2 0           Assessment/Plan:     ICD-10-CM ICD-9-CM    1. Cervical radiculopathy M54.12 723. 4 pregabalin (LYRICA) 50 mg capsule      oxyCODONE-acetaminophen (PERCOCET)  mg per tablet      DULoxetine (CYMBALTA) 30 mg capsule      lidocaine (LIDODERM) 5 %   2. Myalgia M79.10 729.1    3. Degeneration of cervical intervertebral disc M50.30 722. 4 pregabalin (LYRICA) 50 mg capsule      oxyCODONE-acetaminophen (PERCOCET)  mg per tablet      lidocaine (LIDODERM) 5 %   4. Spinal stenosis in cervical region M48.02 723.0 pregabalin (LYRICA) 50 mg capsule      oxyCODONE-acetaminophen (PERCOCET)  mg per tablet      lidocaine (LIDODERM) 5 %   5. Cervicalgia M54.2 723.1 pregabalin (LYRICA) 50 mg capsule      oxyCODONE-acetaminophen (PERCOCET)  mg per tablet      DULoxetine (CYMBALTA) 30 mg capsule      lidocaine (LIDODERM) 5 %   6. Chronic pain syndrome G89.4 338. 4 pregabalin (LYRICA) 50 mg capsule      oxyCODONE-acetaminophen (PERCOCET)  mg per tablet      lidocaine (LIDODERM) 5 %   7. Osteoarthritis of spine with radiculopathy, cervical region M47.22 721.0 pregabalin (LYRICA) 50 mg capsule      oxyCODONE-acetaminophen (PERCOCET)  mg per tablet      lidocaine (LIDODERM) 5 %   8. Constipation due to opioid therapy K59.03 564.09     T40.2X5A E935.2    9. Encounter for long-term (current) use of high-risk medication Z79.899 V58.69 CANCELED: DRUG SCREEN      CANCELED: AMB POC DRUG SCREEN ()        Do not recommend long term opioid therapy for this patient at this time for their chronic pain; the risks outweigh the potential benefits. Pt currently taking Percocet 10/325mg up to 5 times a day as needed with a total of 150 tabs to be budgeted over 30 days. Their MME is 75. She has successfully weaned off Fentanyl with last fill date of 1/2/19. Today, we will continue with this dosing and plan to continue the weaning of patients opioid medication with a goal of being opioid free, pending safety and compliance at next office visit. Pt instructed to call if they experience any signs of withdrawal (diarrhea, nausea, vomiting, sweating or chills, agitation, itching). At next office visit, the plan is to provide patient with Percocet 10/325mg up to 5 times a day as needed with a total of 140 tabs to be budgeted over 30 days. If patient has difficulty with the wean or difficulty with cravings we will consider referral to mental health for ongoing assessment and treatment for opioid use disorder. Will continue up titration of Lyrica as tolerated; pt provided with 50mg BID today. Continue Naproxen, Flexeril and Movantik as previously recommended. Cymbalta and Lidoderm patches refilled today. TENS unit has been ordered; consider NexWave E-Stim device if unable to obtain or if ineffective. Consider H-wave in home therapy trial in the future. Planned MARILOU with Dr Crystal Knight on upon preauthorization from [employer] comp.   Continue Trapezius TPI as needed. Follow up ongoing assessment and ongoing development of integrative and comprehensive plan of care for chronic pain. Goals: To establish complementary and integrative plan of care to address chronic pain issues while minimizing pharmaceuticals to maximize patient's function improve quality of life. Education:  Patient again educated on the importance of strict compliance with the opioid care agreement while on opioid therapy. Patient also again educated that they should avoid driving while on chronic opioid therapy. Also advised to avoid alcohol and to avoid benzodiazepines while on opioid therapy. Handouts given regarding opioid safety. Patient Homework:  Continue to independently investigate yoga for persons with chronic pain. Follow-up and Dispositions    · Return in about 3 months (around 7/25/2019) for 30 min.               Social History     Socioeconomic History    Marital status:      Spouse name: Not on file    Number of children: Not on file    Years of education: Not on file    Highest education level: Not on file   Occupational History    Not on file   Social Needs    Financial resource strain: Not on file    Food insecurity:     Worry: Not on file     Inability: Not on file    Transportation needs:     Medical: Not on file     Non-medical: Not on file   Tobacco Use    Smoking status: Never Smoker    Smokeless tobacco: Never Used   Substance and Sexual Activity    Alcohol use: No    Drug use: No    Sexual activity: Yes     Birth control/protection: Surgical     Comment: Hysterectomy   Lifestyle    Physical activity:     Days per week: Not on file     Minutes per session: Not on file    Stress: Not on file   Relationships    Social connections:     Talks on phone: Not on file     Gets together: Not on file     Attends Congregation service: Not on file     Active member of club or organization: Not on file     Attends meetings of clubs or organizations: Not on file     Relationship status: Not on file    Intimate partner violence:     Fear of current or ex partner: Not on file     Emotionally abused: Not on file     Physically abused: Not on file     Forced sexual activity: Not on file   Other Topics Concern    Not on file   Social History Narrative    Not on file     Family History   Problem Relation Age of Onset    Diabetes Mother     Breast Cancer Mother     Heart Attack Other     Diabetes Sister     Diabetes Brother      Allergies   Allergen Reactions    Gabapentin Other (comments)     Body numbness/tingling    Oxycontin [Oxycodone] Not Reported This Time     Pt states not allergic to oxycontin       Past Medical History:   Diagnosis Date    Brachial neuritis or radiculitis NOS     Calculus of kidney     Cervicalgia     Degeneration of cervical intervertebral disc     GERD (gastroesophageal reflux disease)     Hypertension     Ill-defined condition     Gout    Pancreatitis 2012    Thyroid cancer ([facility] [address]      Past Surgical History:   Procedure Laterality Date    HX BREAST BIOPSY      Negative    HX CHOLECYSTECTOMY      HX HYSTERECTOMY      HX LITHOTRIPSY      HX OOPHORECTOMY      HX THYROIDECTOMY      Partial thyroidectomy     Current Outpatient Medications on File Prior to Visit   Medication Sig    naloxegol (MOVANTIK) 25 mg tab tablet Take 1 Tab by mouth Daily (before breakfast) for 30 days. Take daily before breakfast for opioid induced constipation  Indications: Opiate Pain Medication causing Severe Constipation    naproxen (NAPROSYN) 500 mg tablet Take 1 Tab by mouth daily (with breakfast) for 30 days.  cyclobenzaprine (FLEXERIL) 5 mg tablet Take 1 Tab by mouth three (3) times daily as needed for Muscle Spasm(s).  cloNIDine HCl (CATAPRES) 0.1 mg tablet Take 0.1 mg by mouth two (2) times a day.  hydrALAZINE (APRESOLINE) 50 mg tablet Take 25 mg by mouth three (3) times daily.     ezetimibe (ZETIA) 10 mg tablet Take 10 mg by mouth daily.    amLODIPine (NORVASC) 10 mg tablet Take 10 mg by mouth daily.  rosuvastatin (CRESTOR) 10 mg tablet Take 10 mg by mouth nightly.  ergocalciferol (ERGOCALCIFEROL) 50,000 unit capsule Take 1 Cap by mouth as directed. Once  a week    levothyroxine (SYNTHROID) 175 mcg tablet Take 175 mcg by mouth Daily (before breakfast).  TENS Units (TENS 504) gladys Please provide patient TENS unit device and pads for chronic neck pain to help improve quality of life and functioning and to decrease pain and medication use.  Transparent Dressings (TEGADERM) 3 1/2 X 4 \" bndg 2 Patches by Apply Externally route every fourty-eight (48) hours. Dispense two boxes     No current facility-administered medications on file prior to visit. [address] was used for portions of this report. Unintended errors may occur.

## 2019-05-24 DIAGNOSIS — M47.22 OSTEOARTHRITIS OF SPINE WITH RADICULOPATHY, CERVICAL REGION: ICD-10-CM

## 2019-05-24 DIAGNOSIS — G89.4 CHRONIC PAIN SYNDROME: ICD-10-CM

## 2019-05-24 DIAGNOSIS — M54.12 CERVICAL RADICULOPATHY: ICD-10-CM

## 2019-05-24 DIAGNOSIS — M50.30 DEGENERATION OF CERVICAL INTERVERTEBRAL DISC: ICD-10-CM

## 2019-05-24 DIAGNOSIS — M54.2 CERVICALGIA: ICD-10-CM

## 2019-05-24 DIAGNOSIS — M48.02 SPINAL STENOSIS IN CERVICAL REGION: ICD-10-CM

## 2019-05-24 NOTE — TELEPHONE ENCOUNTER
Rohan Major has called requesting a refill of their controlled medication, Percocet 10/325 mg tab, for the management of chronic pain. Last office visit date: 4/25/19 with Jayson, and has a f/u appt 7/17/19. Last opioid care agreement 4/25/19  Last UDS was done 4/2/19    Date last  was pulled and reviewed : 5/24/19  Last fill date for medication was 5/2/19    Was the patient compliant when the above report was pulled? Patient filled Alprazolam from another provider on 5/17/19. Analgesia: Patient reports 50% pain relief on current regimen. Aberrancies: No aberrancies noted in the last 30 days. ADL's: Patient states they are able to perform ADL's on current regimen. Adverse Reaction: Patient reports no adverse reactions at this time. Provider's last note and plan of care reviewed? yes  Request forwarded to provider for review.

## 2019-05-28 RX ORDER — OXYCODONE AND ACETAMINOPHEN 10; 325 MG/1; MG/1
1 TABLET ORAL
Qty: 150 TAB | Refills: 0 | Status: SHIPPED | OUTPATIENT
Start: 2019-05-31 | End: 2019-06-26 | Stop reason: SDUPTHER

## 2019-05-28 NOTE — TELEPHONE ENCOUNTER
Patient identified using two patient identifiers (name and ); patient advised prescriptions are ready for pick-up. Patient also informed  hours are Mon-Fri 1476-1938. Patient verbalized understanding. supple/no JVD

## 2019-06-05 ENCOUNTER — APPOINTMENT (OUTPATIENT)
Dept: GENERAL RADIOLOGY | Age: 59
End: 2019-06-05
Attending: PHYSICAL MEDICINE & REHABILITATION
Payer: OTHER MISCELLANEOUS

## 2019-06-05 ENCOUNTER — HOSPITAL ENCOUNTER (OUTPATIENT)
Age: 59
Setting detail: OUTPATIENT SURGERY
Discharge: HOME | End: 2019-06-05
Attending: PHYSICAL MEDICINE & REHABILITATION | Admitting: PHYSICAL MEDICINE & REHABILITATION
Payer: OTHER MISCELLANEOUS

## 2019-06-05 VITALS
HEART RATE: 71 BPM | HEIGHT: 71 IN | SYSTOLIC BLOOD PRESSURE: 158 MMHG | OXYGEN SATURATION: 100 % | DIASTOLIC BLOOD PRESSURE: 83 MMHG | BODY MASS INDEX: 32.2 KG/M2 | RESPIRATION RATE: 16 BRPM | TEMPERATURE: 97.9 F | WEIGHT: 230 LBS

## 2019-06-05 PROCEDURE — 74011636320 HC RX REV CODE- 636/320: Performed by: PHYSICAL MEDICINE & REHABILITATION

## 2019-06-05 PROCEDURE — 74011250636 HC RX REV CODE- 250/636: Performed by: PHYSICAL MEDICINE & REHABILITATION

## 2019-06-05 PROCEDURE — 76010000009 HC PAIN MGT 0 TO 30 MIN PROC: Performed by: PHYSICAL MEDICINE & REHABILITATION

## 2019-06-05 RX ORDER — LIDOCAINE HYDROCHLORIDE 10 MG/ML
INJECTION, SOLUTION EPIDURAL; INFILTRATION; INTRACAUDAL; PERINEURAL AS NEEDED
Status: DISCONTINUED | OUTPATIENT
Start: 2019-06-05 | End: 2019-06-05 | Stop reason: HOSPADM

## 2019-06-05 RX ORDER — DEXAMETHASONE SODIUM PHOSPHATE 100 MG/10ML
INJECTION INTRAMUSCULAR; INTRAVENOUS AS NEEDED
Status: DISCONTINUED | OUTPATIENT
Start: 2019-06-05 | End: 2019-06-05 | Stop reason: HOSPADM

## 2019-06-05 NOTE — PROCEDURES
[facility]  Brief Pre-Procedure History & Physical    PATIENT NAME:  Guicho Torres   YOB: 1960  DATE OF SERVICE:  6/5/2019      CHIEF COMPLAINT:  Pain    HISTORY OF PRESENT ILLNESS:  Guicho Torres presents today for a previously diagnosed problem contributing to some or all of this patients pain. The location and pattern of the pain has not changed substantially since the last visit in our office. No other significant medical changes have occurred in the last 30 days. PAST MEDICAL HISTORY:  The patient  has a past medical history of Brachial neuritis or radiculitis NOS, Calculus of kidney, Cervicalgia, Degeneration of cervical intervertebral disc, GERD (gastroesophageal reflux disease), Hypertension, Ill-defined condition, Pancreatitis (2012), and [facility] PAST SURGICAL HISTORY:  The patient  has a past surgical history that includes hx hysterectomy; hx oophorectomy; hx breast biopsy; hx lithotripsy; hx thyroidectomy; and hx cholecystectomy. CURRENT MEDICATIONS:  See Medication Administration Record [facility]) in the patient's electronic record. ALLERGIES:    Allergies   Allergen Reactions    Gabapentin Other (comments)     Body numbness/tingling    Oxycontin [Oxycodone] Not Reported This Time     Pt states not allergic to oxycontin         FAMILY HISTORY:  The patient family history includes Breast Cancer in her mother; Diabetes in her brother, mother, and sister; Heart Attack in an other family member. SOCIAL HISTORY:  The patient  reports that she has never smoked. She has never used smokeless tobacco. The patient  reports that she does not drink alcohol. She  reports that she does not use drugs. REVIEW OF SYSTEMS:  Guicho Torres denies any fever, chills, unexplained weight loss, use of antibiotics for recent infection or bleeding abnormalities.     PHYSICAL EXAM:  VS:   Visit Vitals  /81 (BP 1 Location: Left arm, BP Patient Position: Sitting)   Pulse 67   Temp 97.9 °F (36.6 °C)   Resp 15   Ht 5' 11\" (1.803 m)   Wt 104.3 kg (230 lb)   SpO2 96%   BMI 32.08 kg/m²     Gen: Well-developed. Body habitus consistent with recorded height and weight and the calculated BMI. No apparent distress. Head: Normocephalic, atraumatic. Skin: No obvious rashes, lesions or infection. Pulm: Respirations are even and unlabored. Psych:    Mood, affect and speech  Appropriate. ASSESSMENT:   1. Stable for cervical epidural steroid injection as discussed. PLAN:  Proceed with scheduled procedure. Elicia Orellana DO 6/5/2019 Time: [facility] [facility]IN [facility]NAGEMENT    INTERLAMINAR CERVICAL EPIDURAL STEROID INJECTION  PROCEDURE REPORT      PATIENT:  Pollo Miller  YOB: 1960  DATE OF SERVICE:  6/5/2019  SITE:   [facility] Special Procedures Suite    PRE-PROCEDURE DIAGNOSIS:  M50.30, m54.12    POST-PROCEDURE DIAGNOSIS:  See Above                PROCEDURE:    1. Interlaminar cervical epidural steroid injection,  T1-2 (40565)  2. Fluoroscopic needle guidance (spinal) (56660)    ANESTHESIA:  Local only. See Medication Administration Record for specific medications and dosage. COMPLICATIONS: None. PHYSICIAN:  Elicia Orellana DO    PRE-PROCEDURE NOTE:  Pre-procedural assessment of the patient was performed including a limited history and physical examination. The details of the procedure were discussed with the patient, including the risks, benefits and alternative options and an informed consent was obtained. The patients NPO status, if necessary for the specific procedure and/or administration of moderate intravenous sedation, if utilized, and availability of a responsible adult to escort the patient following the procedure were confirmed.     PROCEDURE NOTE:  The patient was brought to the procedure suite and positioned on the fluoroscopy table in the prone position. Physiologic monitors were applied and supplemental oxygen was administered via nasal cannula. The skin was prepped in the standard surgical fashion and sterile drapes were applied over the procedure site. Please refer to the Flowsheet for documentation of the patients vital signs and the Medication Administration Record for any oral and/or intravenous sedation administered prior to or during the procedure. The above-listed interlaminar space was identified and the skin and subcutaneous tissues were infiltrated with 1% Lidocaine. Under anterior-posterior fluoroscopic guidance an 18g, 3.5-inch Tuohy epidural needle was advanced along the previously identified interlaminar space. The fluoroscope was then turned contralateral oblique view and a loss of resistance syringe was attached to the needle containing preservative free normal saline. Under lateral flouroscopic guidance, the needle was then advanced through the ligamentum flavum, entering the epidural space with a clear and crisp loss of resistance. The needle was not advanced beyond the interlaminar line at any time during this process. No CSF was noted. Aspiration was negative for blood or CSF. Additional confirmation was made with the injection of 0.5 mL of a nonionic water-soluble radiographic contrast medium (Isovue-M 200) demonstrating appropriate epidural spread and the absence of vascular uptake. Following this, a 3 mL solution comprised of 2 mL of dexamethasone (10mg/ml) and 1 mL of lidocaine 1% preservative free was injected slowly after negative aspiration. The needle was cleared of steroid solution and removed. The area was thoroughly cleaned and sterile bandages applied as necessary. The patient tolerated the procedure well and vital signs remained stable throughout the procedure.     POST-PROCEDURE COURSE:  The patient was escorted from the procedure suite in satisfactory condition and recovered per facility protocol based on the type of procedure performed and/or the sedation utilized. The patient did not experience any adverse events and remained hemodynamically stable during the post-procedure period. DISCHARGE NOTE:  Upon discharge, the patient was able to tolerate fluids and was in no acute distress. The patient was oriented to person, place and time and vital signs were stable. Appropriate post-procedure instructions were provided and explained to the patient in detail and all questions were answered.     Carlos Manuel Dasilva DO 6/5/2019 Time:1120

## 2019-06-05 NOTE — DISCHARGE INSTRUCTIONS
[address] [place] [address] for Pain Management  Dr. Davey Healy Procedures Instructions    *Resume Diet and Activity as tolerated. Rest for the remainder of the day. *You may fell worse before you feel better as the numbing medications wear off before the steroids take effect if used for your procedures. *Do not use affected extremity until numbness or loss of sensation has completely resolved without assistance. *DO NOT DRIVE, operate machinery/heavey equipment for 24 hours. *DO NOT DRINK ALCOHOL for 24 hours as it may interact with the sedation if you received it and also thins your blood and may cause you to bleed. *WAIT 24 hours before starting back ANY Blood thinning medications:   (Heparin, Coumadin, Warfarin, Lovenox, Plavix, Aggrenox)    *Resume Pre-Procedure Medications as prescribed except Blood Thinners unless directed by your Physician or Cardiologist.     *Avoid Hot tubs and Heating pad for 24 hours to prevent dissipation of medications, you may shower to remove bandages and remaining prep residue on the skin. * If you develop a Headache, drink plenty of fluids including beverages with caffeine (Coffee, Mt. Dew etc.) and rest.  If the headache persists longer than 24 hoursor intensifies - Please call [facility] ([facility]) [phone]    * If you are DIABETIC, check your blood sugar three times a day for the next three days, the steroids will increase your blood sugar. If your blood sugar is greater than 400 have someone drive you to the nearest [address]. * If you experience any of the following problems, call the [facility] [phone] between 8:00 am - 4:30pm or After Hours [phone].     Shortness of breath    Fever of 101 F or higher    Nausea / Vomiting (not normal to you)    Increasing stiffness in the neck    Weakness or numbness in the arms or legs that is not resolving    Prolonged and increasing pain > than 4 days    ANYTHING OUT of the ORDINARY TO YOU    If YOU are experiencing a severe reaction / complication that you have never had before post procedure, call 911 or go to the nearest emergency room! All patients must have a  for transportation [place] regardless if you do or do not receive sedation. DISCHARGE SUMMARY from Nurse      PATIENT INSTRUCTIONS:    After Oral  or intravenous sedation, for 24 hours or while taking prescription Narcotics:  · Limit your activities  · Do not drive and operate hazardous machinery  · Do not make important personal or business decisions  · Do  not drink alcoholic beverages  · If you have not urinated within 8 hours after discharge, please contact your surgeon on call. Report the following to your surgeon:  · Excessive pain, swelling, redness or odor of or around the surgical area  · Temperature over 101  · Nausea and vomiting lasting longer than 4 hours or if unable to take medications  · Any signs of decreased circulation or nerve impairment to extremity: change in color, persistent  numbness, tingling, coldness or increase pain  · Any questions        What to do at Home:  Recommended activity: Activity as tolerated, NO DRIVING FOR 24 Hours post injection          *  Please give a list of your current medications to your Primary Care Provider. *  Please update this list whenever your medications are discontinued, doses are      changed, or new medications (including over-the-counter products) are added. *  Please carry medication information at all times in case of emergency situations. These are general instructions for a healthy lifestyle:    No smoking/ No tobacco products/ Avoid exposure to second hand smoke    Surgeon General's Warning:  Quitting smoking now greatly reduces serious risk to your health.     Obesity, smoking, and sedentary lifestyle greatly increases your risk for illness    A healthy diet, regular physical exercise & weight monitoring are important for maintaining a healthy lifestyle    You may be retaining fluid if you have a history of heart failure or if you experience any of the following symptoms:  Weight gain of 3 pounds or more overnight or 5 pounds in a week, increased swelling in our hands or feet or shortness of breath while lying flat in bed. Please call your doctor as soon as you notice any of these symptoms; do not wait until your next office visit. Recognize signs and symptoms of STROKE:    F-face looks uneven    A-arms unable to move or move unevenly    S-speech slurred or non-existent    T-time-call 911 as soon as signs and symptoms begin-DO NOT go       Back to bed or wait to see if you get better-TIME IS BRAIN.

## 2019-06-13 ENCOUNTER — TELEPHONE (OUTPATIENT)
Dept: PAIN MANAGEMENT | Age: 59
End: 2019-06-13

## 2019-06-13 NOTE — TELEPHONE ENCOUNTER
1. Ms. Nancy Colindres was contacted for follow-up status post Interlaminar cervical epidural steroid injection,  T1-2    on June 5, 2019.  She reports:    Pre-procedure numerical pain score: 8/10  Post-procedure numerical pain score immediately after: 4/10  Duration of relief post-procedure (if applicable): 1 weeks  Improvement in functional activities (if applicable): Yes  Percentage of overall improvement: 50%    COMMENTS:

## 2019-06-13 NOTE — TELEPHONE ENCOUNTER
Attempted to contact patient regarding post procedure call ; no answer noted at number given; vm left to contact [phone].

## 2019-06-26 DIAGNOSIS — M50.30 DEGENERATION OF CERVICAL INTERVERTEBRAL DISC: ICD-10-CM

## 2019-06-26 DIAGNOSIS — M47.22 OSTEOARTHRITIS OF SPINE WITH RADICULOPATHY, CERVICAL REGION: ICD-10-CM

## 2019-06-26 DIAGNOSIS — M48.02 SPINAL STENOSIS IN CERVICAL REGION: ICD-10-CM

## 2019-06-26 DIAGNOSIS — M54.2 CERVICALGIA: ICD-10-CM

## 2019-06-26 DIAGNOSIS — G89.4 CHRONIC PAIN SYNDROME: ICD-10-CM

## 2019-06-26 DIAGNOSIS — M54.12 CERVICAL RADICULOPATHY: ICD-10-CM

## 2019-06-26 RX ORDER — OXYCODONE AND ACETAMINOPHEN 10; 325 MG/1; MG/1
1 TABLET ORAL
Qty: 150 TAB | Refills: 0 | Status: SHIPPED | OUTPATIENT
Start: 2019-06-30 | End: 2019-07-30 | Stop reason: SDUPTHER

## 2019-06-26 NOTE — TELEPHONE ENCOUNTER
Spoke with patient after getting 2 points of identity, informing patient that she has a script ready to pickup at the office, please arrive by 3:45pm for pickup Monday thru Friday and bring a valid picture ID. Patient stated she will come herself.

## 2019-06-26 NOTE — TELEPHONE ENCOUNTER
[facility] has called requesting a refill of their controlled medication, Percocet, for the management of chronic pain. Last office visit date: 4/25/19  Last opioid care agreement 11/18  Last UDS was done 4/19/19    Date last  was pulled and reviewed : 6/26/19  Last fill date for medication was 5/31/19    Was the patient compliant when the above report was pulled? No filled Alprazolam 1mg 6/21/19 # 60    Analgesia: 40-50%    Aberrancies: none    ADL's: yes    Adverse Reaction: none    Provider's last note and plan of care reviewed? yes  Request forwarded to provider for review.

## 2019-07-30 ENCOUNTER — OFFICE VISIT (OUTPATIENT)
Dept: PAIN MANAGEMENT | Age: 59
End: 2019-07-30

## 2019-07-30 VITALS
BODY MASS INDEX: 32.2 KG/M2 | SYSTOLIC BLOOD PRESSURE: 134 MMHG | OXYGEN SATURATION: 96 % | WEIGHT: 230 LBS | HEART RATE: 59 BPM | DIASTOLIC BLOOD PRESSURE: 82 MMHG | HEIGHT: 71 IN | TEMPERATURE: 98.5 F | RESPIRATION RATE: 16 BRPM

## 2019-07-30 DIAGNOSIS — G89.4 CHRONIC PAIN SYNDROME: ICD-10-CM

## 2019-07-30 DIAGNOSIS — M79.10 MYALGIA: ICD-10-CM

## 2019-07-30 DIAGNOSIS — Z79.899 ENCOUNTER FOR LONG-TERM (CURRENT) USE OF HIGH-RISK MEDICATION: ICD-10-CM

## 2019-07-30 DIAGNOSIS — M50.30 DEGENERATION OF CERVICAL INTERVERTEBRAL DISC: ICD-10-CM

## 2019-07-30 DIAGNOSIS — M48.02 SPINAL STENOSIS IN CERVICAL REGION: ICD-10-CM

## 2019-07-30 DIAGNOSIS — M54.81 BILATERAL OCCIPITAL NEURALGIA: ICD-10-CM

## 2019-07-30 DIAGNOSIS — G24.3 SPASMODIC TORTICOLLIS: ICD-10-CM

## 2019-07-30 DIAGNOSIS — M54.2 CERVICALGIA: Primary | ICD-10-CM

## 2019-07-30 DIAGNOSIS — M54.12 CERVICAL RADICULOPATHY: ICD-10-CM

## 2019-07-30 DIAGNOSIS — T40.2X5A CONSTIPATION DUE TO OPIOID THERAPY: ICD-10-CM

## 2019-07-30 DIAGNOSIS — M47.22 OSTEOARTHRITIS OF SPINE WITH RADICULOPATHY, CERVICAL REGION: ICD-10-CM

## 2019-07-30 DIAGNOSIS — K59.03 CONSTIPATION DUE TO OPIOID THERAPY: ICD-10-CM

## 2019-07-30 RX ORDER — DULOXETIN HYDROCHLORIDE 30 MG/1
30 CAPSULE, DELAYED RELEASE ORAL 2 TIMES DAILY
Qty: 60 CAP | Refills: 2 | Status: SHIPPED | OUTPATIENT
Start: 2019-07-30 | End: 2019-08-29

## 2019-07-30 RX ORDER — PENICILLIN V POTASSIUM 500 MG/1
500 TABLET, FILM COATED ORAL 4 TIMES DAILY
Refills: 0 | COMMUNITY
Start: 2019-07-24 | End: 2019-10-23

## 2019-07-30 RX ORDER — CYCLOBENZAPRINE HCL 5 MG
5 TABLET ORAL
Qty: 90 TAB | Refills: 2 | Status: SHIPPED | OUTPATIENT
Start: 2019-07-30 | End: 2019-10-23 | Stop reason: SDUPTHER

## 2019-07-30 RX ORDER — ALPRAZOLAM 1 MG/1
1 TABLET ORAL 2 TIMES DAILY
Refills: 0 | COMMUNITY
Start: 2019-07-21

## 2019-07-30 RX ORDER — TRAMADOL HYDROCHLORIDE 50 MG/1
50 TABLET ORAL 4 TIMES DAILY
Refills: 0 | COMMUNITY
Start: 2019-07-24 | End: 2019-10-23

## 2019-07-30 RX ORDER — PREGABALIN 50 MG/1
50 CAPSULE ORAL 2 TIMES DAILY
Qty: 60 CAP | Refills: 2 | Status: SHIPPED | OUTPATIENT
Start: 2019-07-30 | End: 2019-08-29

## 2019-07-30 RX ORDER — OXYCODONE AND ACETAMINOPHEN 10; 325 MG/1; MG/1
1 TABLET ORAL
Qty: 150 TAB | Refills: 0 | Status: SHIPPED | OUTPATIENT
Start: 2019-07-23 | End: 2019-08-29 | Stop reason: SDUPTHER

## 2019-07-30 RX ORDER — LIDOCAINE 50 MG/G
1 PATCH TOPICAL EVERY 24 HOURS
Qty: 30 PATCH | Refills: 2 | Status: SHIPPED | OUTPATIENT
Start: 2019-07-30 | End: 2019-10-23 | Stop reason: SDUPTHER

## 2019-07-30 RX ORDER — FAMOTIDINE 20 MG/1
20 TABLET, FILM COATED ORAL 2 TIMES DAILY
Refills: 0 | COMMUNITY
Start: 2019-07-24 | End: 2019-10-23

## 2019-07-30 RX ORDER — ONDANSETRON 4 MG/1
4 TABLET, FILM COATED ORAL AS NEEDED
COMMUNITY
Start: 2019-07-23

## 2019-07-30 NOTE — PROGRESS NOTES
Nursing Notes    Patient presents to the office today in follow-up. Patient rates her pain at 6/10 on the numerical pain scale. Reviewed medications with counts as follows:    Rx Date filled Qty Dispensed Pill Count Last Dose Short   Percocet 10 mg 06/30/19 150 4 This am  no        reviewed YES  Any aberrancies noted on  YES  Last opioid agreement 04/25/19  Last urine drug screen 04/02/19    Comments:  Patient is here today for a follow up appt today for her chronic neck pain. She states her pain level today is a   phq 2 was done patient has anxiety issues and is being treated for it. Patient was seen in the ER for SOB and back pain. Patient is being treated for an infection in her tooth. She is on abx currently is was given Tramadol. She presented with the full bottle so show the provider the meds. She will have the extractions once his abx therapy is complete   Patient states that she has fluid in her ear and will be following with ENT. POC UDS was not performed in office today    Any new labs or imaging since last appointment? NO    Have you been to an emergency room (ER) or urgent care clinic since your last visit? YES            Have you been hospitalized since your last visit? NO     If yes, where, when, and reason for visit? Have you seen or consulted any other health care providers outside of the [address] [place] [address]  since your last visit? YES [facility]   If yes, where, when, and reason for visit? Ms. Allison Bustillos has a reminder for a \"due or due soon\" health maintenance. I have asked that she contact her primary care provider for follow-up on this health maintenance.

## 2019-07-30 NOTE — PROGRESS NOTES
Referral date 4/30/2003, source Dr Kathleen Martínez and for neck pain. Calculated MEQ - 75  Naloxone rescue - yes  Prophylactic bowel program - Movantik  Date of last OCA 4/25/19  Last UDS 4/2/19, consistent  Prior UDS 11/15/18, consistent    date checked today, findings consistent      Today   Last Visit  Prior Visit(s)  NELSY -    0  [facility] - 3 & 4  1 & 5  1 & 5  NELSY - 53%  58%  did not complete  COMM - 2  2  3      HPI:  Grant Rodriguez is a 60 y.o. female here for f/u visit for ongoing evaluation of chronic neck pain related to a work-related injury on 4/21/1992. Pt was last seen here on 4/25/19. Pt denies interval changes on the character or distribution of pain. Pain is located posterior midline cervical region along cervicothoracic junction and along bilateral trapezius region. The pain is described as throbbing, aching and pinching. She also reports popping and crepitus. Pain at its best is 5/10. Pain at its worse is 8/10. Average pain 6/10. The pain is worsened by cold/rainy weather. Symptoms are improved by Cymbalta, Percocet, Lyrica, MARILOU and TPI. Gabapentin with negative side effects. Xanax from outside provider. Since last visit, she has been having right ear pain, bleeding and fluid; her PCP is managing this. She has also had issues with a left upper tooth that she will be having pulled on 8/9/19. She was sick and had to reschedule this procedure once. She was given Tramadol and PCN; she filled Tramadol on 7/24/19 #12 but has not taken any and has the bottle with her today. S/P cervical MARILOU with Dr Nicholas 6/5/19 with 40% improvement. Increase in Lyrica has been helping.      Interventional Pain Procedures:  6/5/19 - cervical epidural steroid injection with Dr Nicholas  4/25/19 - bilateral upper trapezius TPI with Dr Nicholas  4/11/18 & 7/18/17 - cervical epidural steroid injection with Dr Carlos Eduardo Mensah    ROS:  Reports fever, chills, nausea, vomiting and abdominal pain (all of which were addressed when going to ED recently; pt thinks she ate bad crabs at a friends house). Also reports anxiety. Denies diarrhea, constipation, chest pain, shortness or breath/trouble breathing, weakness, trouble swallowing, changes in vision, changes in hearing, falls, dizziness, bladder incontinence, bowel incontinence, depression, suicidal ideations, homicidal ideations or alcohol use. Opioid specific risk: concurrent benzo use, anxiety, insomnia, obesity, GERD. Vitals:    07/30/19 1042   BP: 134/82   Pulse: (!) 59   Resp: 16   Temp: 98.5 °F (36.9 °C)   SpO2: 96%   Weight: 104.3 kg (230 lb)   Height: 5' 11\" (1.803 m)   PainSc:   7   PainLoc: Neck       Physical exam:  AFVSS, NAD, mesomorphic body habitus. A&OXs 3. Normocephalic, atraumatic. Conjugate gaze, clear sclerae. Speech is clear and appropriate. Mood is appropriate and patient is cooperative. Gait is within functional limits. Balance is within functional limits. Non-labored breathing. Primary Care Physician  [facility], [address] [place] [address]  [phone]      PHQ -- .  3 most recent PHQ Screens 7/30/2019   PHQ Not Done -   Little interest or pleasure in doing things Not at all   Feeling down, depressed, irritable, or hopeless Not at all   Total Score PHQ 2 0           Assessment/Plan:     ICD-10-CM ICD-9-CM    1. Cervicalgia M54.2 723.1 lidocaine (LIDODERM) 5 %      oxyCODONE-acetaminophen (PERCOCET)  mg per tablet      cyclobenzaprine (FLEXERIL) 5 mg tablet      pregabalin (LYRICA) 50 mg capsule      DULoxetine (CYMBALTA) 30 mg capsule   2. Spinal stenosis in cervical region M48.02 723.0 lidocaine (LIDODERM) 5 %      oxyCODONE-acetaminophen (PERCOCET)  mg per tablet      pregabalin (LYRICA) 50 mg capsule   3. Cervical radiculopathy M54.12 723.4 lidocaine (LIDODERM) 5 %      oxyCODONE-acetaminophen (PERCOCET)  mg per tablet      pregabalin (LYRICA) 50 mg capsule      DULoxetine (CYMBALTA) 30 mg capsule   4.  Bilateral occipital neuralgia M54.81 723.8 cyclobenzaprine (FLEXERIL) 5 mg tablet   5. Osteoarthritis of spine with radiculopathy, cervical region M47.22 721.0 lidocaine (LIDODERM) 5 %      oxyCODONE-acetaminophen (PERCOCET)  mg per tablet      pregabalin (LYRICA) 50 mg capsule   6. Degeneration of cervical intervertebral disc M50.30 722.4 lidocaine (LIDODERM) 5 %      oxyCODONE-acetaminophen (PERCOCET)  mg per tablet      pregabalin (LYRICA) 50 mg capsule   7. Constipation due to opioid therapy K59.03 564.09 naloxegol (MOVANTIK) 25 mg tab tablet    T40.2X5A E935.2    8. Myalgia M79.10 729.1    9. Chronic pain syndrome G89.4 338.4 lidocaine (LIDODERM) 5 %      oxyCODONE-acetaminophen (PERCOCET)  mg per tablet      pregabalin (LYRICA) 50 mg capsule   10. Encounter for long-term (current) use of high-risk medication Z79.899 V58.69    11. Spasmodic torticollis G24.3 333.83 cyclobenzaprine (FLEXERIL) 5 mg tablet        --I do not recommend long term opioid therapy for this patient at this time for their chronic pain; the risks outweigh the potential benefits. Pt currently taking Percocet 10/325mg up to 5 times a day as needed with a total of 150 tabs to be budgeted over 30 days. Their MME is 75. She has successfully weaned off Fentanyl with last fill date of 1/2/19. --Today, we will pause the weaning of patients opioid medication with a goal of being opioid free, pending safety and compliance at next office visit. Pt instructed to call if they experience any signs of withdrawal (diarrhea, nausea, vomiting, sweating or chills, agitation, itching). --At next office visit, the plan is to provide patient with Percocet 10/325mg up to 5 times a day as needed with a total of 140 tabs to be budgeted over 30 days. If patient has difficulty with the wean or difficulty with cravings we will consider referral to mental health for ongoing assessment and treatment for opioid use disorder.   --Lyrica 50mg BID refilled today; consider increasing at next office visit. --Continue Naproxen as previously recommended. --Flexeril, Movantik, Cymbalta and Lidoderm patches refilled today. --Follow up with TENS unit next visit; consider NexWave E-Stim device if unable to obtain or if ineffective. --H-wave in office trial ordered today. --Continue Trapezius TPI as needed. --Per Dr Ondina Fernandez note on 4/12/19 \"\"if there is any change in the character or distribution of pain in the cervical spine or upper extremities please request updated cervical MRI. \"\"  --Follow up ongoing assessment and ongoing development of integrative and comprehensive plan of care for chronic pain. Goals: To establish complementary and integrative plan of care to address chronic pain issues while minimizing pharmaceuticals to maximize patient's function improve quality of life. Education:  Patient again educated on the importance of strict compliance with the opioid care agreement while on opioid therapy. Patient also again educated that they should avoid driving while on chronic opioid therapy. Also advised to avoid alcohol and to avoid benzodiazepines while on opioid therapy. Handouts given regarding opioid safety. Patient Homework:  Continue to independently investigate yoga for persons with chronic pain. Follow-up and Dispositions    · Return in about 3 months (around 10/30/2019) for 30 min.               Social History     Socioeconomic History    Marital status:      Spouse name: Not on file    Number of children: Not on file    Years of education: Not on file    Highest education level: Not on file   Occupational History    Not on file   Social Needs    Financial resource strain: Not on file    Food insecurity:     Worry: Not on file     Inability: Not on file    Transportation needs:     Medical: Not on file     Non-medical: Not on file   Tobacco Use    Smoking status: Never Smoker    Smokeless tobacco: Never Used   Substance and Sexual Activity    Alcohol use: No    Drug use: No    Sexual activity: Yes     Birth control/protection: Surgical     Comment: Hysterectomy   Lifestyle    Physical activity:     Days per week: Not on file     Minutes per session: Not on file    Stress: Not on file   Relationships    Social connections:     Talks on phone: Not on file     Gets together: Not on file     Attends Hindu service: Not on file     Active member of club or organization: Not on file     Attends meetings of clubs or organizations: Not on file     Relationship status: Not on file    Intimate partner violence:     Fear of current or ex partner: Not on file     Emotionally abused: Not on file     Physically abused: Not on file     Forced sexual activity: Not on file   Other Topics Concern    Not on file   Social History Narrative    Not on file     Family History   Problem Relation Age of Onset    Diabetes Mother     Breast Cancer Mother     Heart Attack Other     Diabetes Sister     Diabetes Brother      Allergies   Allergen Reactions    Gabapentin Other (comments)     Body numbness/tingling    Oxycontin [Oxycodone] Not Reported This Time     Pt states not allergic to oxycontin       Past Medical History:   Diagnosis Date    Brachial neuritis or radiculitis NOS     Calculus of kidney     Cervicalgia     Degeneration of cervical intervertebral disc     GERD (gastroesophageal reflux disease)     Hypertension     Ill-defined condition     Gout    Pancreatitis 2012    Thyroid cancer ([facility] [address]      Past Surgical History:   Procedure Laterality Date    HX BREAST BIOPSY      Negative    HX CHOLECYSTECTOMY      HX HYSTERECTOMY      HX LITHOTRIPSY      HX OOPHORECTOMY      HX THYROIDECTOMY      Partial thyroidectomy     Current Outpatient Medications on File Prior to Visit   Medication Sig    famotidine (PEPCID) 20 mg tablet Take 20 mg by mouth two (2) times a day.     ondansetron hcl (ZOFRAN) 4 mg tablet Take 4 mg by mouth as needed.  ALPRAZolam (XANAX) 1 mg tablet Take 1 mg by mouth two (2) times a day.  penicillin v potassium (VEETID) 500 mg tablet Take 500 mg by mouth four (4) times daily.  traMADol (ULTRAM) 50 mg tablet Take 50 mg by mouth four (4) times daily.  TENS Units (TENS 504) gladys Please provide patient TENS unit device and pads for chronic neck pain to help improve quality of life and functioning and to decrease pain and medication use.  cloNIDine HCl (CATAPRES) 0.1 mg tablet Take 0.1 mg by mouth two (2) times a day.  hydrALAZINE (APRESOLINE) 50 mg tablet Take 25 mg by mouth three (3) times daily.  ezetimibe (ZETIA) 10 mg tablet Take 10 mg by mouth daily.  amLODIPine (NORVASC) 10 mg tablet Take 10 mg by mouth daily.  rosuvastatin (CRESTOR) 10 mg tablet Take 10 mg by mouth nightly.  ergocalciferol (ERGOCALCIFEROL) 50,000 unit capsule Take 1 Cap by mouth as directed. Once  a week    levothyroxine (SYNTHROID) 175 mcg tablet Take 175 mcg by mouth Daily (before breakfast).  [DISCONTINUED] oxyCODONE-acetaminophen (PERCOCET)  mg per tablet Take 1 Tab by mouth every four to six (4-6) hours as needed for Pain for up to 30 days. Max Daily Amount: 5 Tabs. Indications: Pain     No current facility-administered medications on file prior to visit. [address] was used for portions of this report. Unintended errors may occur.

## 2019-07-30 NOTE — PATIENT INSTRUCTIONS

## 2019-08-08 DIAGNOSIS — M47.22 OSTEOARTHRITIS OF SPINE WITH RADICULOPATHY, CERVICAL REGION: ICD-10-CM

## 2019-08-08 DIAGNOSIS — M79.10 MYALGIA: ICD-10-CM

## 2019-08-08 DIAGNOSIS — M50.30 DEGENERATION OF CERVICAL INTERVERTEBRAL DISC: ICD-10-CM

## 2019-08-08 DIAGNOSIS — M48.02 SPINAL STENOSIS IN CERVICAL REGION: ICD-10-CM

## 2019-08-08 DIAGNOSIS — G89.4 CHRONIC PAIN SYNDROME: ICD-10-CM

## 2019-08-08 DIAGNOSIS — M54.12 CERVICAL RADICULOPATHY: ICD-10-CM

## 2019-08-08 DIAGNOSIS — M54.81 BILATERAL OCCIPITAL NEURALGIA: ICD-10-CM

## 2019-08-08 DIAGNOSIS — M54.2 CERVICALGIA: Primary | ICD-10-CM

## 2019-08-29 DIAGNOSIS — M48.02 SPINAL STENOSIS IN CERVICAL REGION: ICD-10-CM

## 2019-08-29 DIAGNOSIS — M54.2 CERVICALGIA: ICD-10-CM

## 2019-08-29 DIAGNOSIS — G89.4 CHRONIC PAIN SYNDROME: ICD-10-CM

## 2019-08-29 DIAGNOSIS — M50.30 DEGENERATION OF CERVICAL INTERVERTEBRAL DISC: ICD-10-CM

## 2019-08-29 DIAGNOSIS — M54.12 CERVICAL RADICULOPATHY: ICD-10-CM

## 2019-08-29 DIAGNOSIS — M47.22 OSTEOARTHRITIS OF SPINE WITH RADICULOPATHY, CERVICAL REGION: ICD-10-CM

## 2019-08-30 RX ORDER — OXYCODONE AND ACETAMINOPHEN 10; 325 MG/1; MG/1
1 TABLET ORAL
Qty: 150 TAB | Refills: 0 | Status: SHIPPED | OUTPATIENT
Start: 2019-08-30 | End: 2019-09-23 | Stop reason: SDUPTHER

## 2019-09-23 DIAGNOSIS — M48.02 SPINAL STENOSIS IN CERVICAL REGION: ICD-10-CM

## 2019-09-23 DIAGNOSIS — M47.22 OSTEOARTHRITIS OF SPINE WITH RADICULOPATHY, CERVICAL REGION: ICD-10-CM

## 2019-09-23 DIAGNOSIS — G89.4 CHRONIC PAIN SYNDROME: ICD-10-CM

## 2019-09-23 DIAGNOSIS — M54.12 CERVICAL RADICULOPATHY: ICD-10-CM

## 2019-09-23 DIAGNOSIS — M54.2 CERVICALGIA: ICD-10-CM

## 2019-09-23 DIAGNOSIS — M50.30 DEGENERATION OF CERVICAL INTERVERTEBRAL DISC: ICD-10-CM

## 2019-09-23 NOTE — TELEPHONE ENCOUNTER
Patient left message [phone] 3:06pm requesting a refill of pain medication percocet    Jaylin Ramos  [phone]        Medication - percocet    40%    ADL - yes    Side effects - none

## 2019-09-23 NOTE — TELEPHONE ENCOUNTER
[facility] has called requesting a refill of their controlled medication Percocet  for the management of chronic neck pain. Last office visit date: 077/30/19  Last opioid care agreement 04/25/19  Last UDS was done 04/02/19    Date last  was pulled and reviewed : 09/23/19  Last fill date for medication was 08/30/19    Was the patient compliant when the above report was pulled? yes    Analgesia: 40%    Aberrancies: yes pt received meds on 09/03 from her dentist, she did not call and inform us of the 10 Norco that she was given. fyi will be put in     ADL's: yes     Adverse Reaction: no    Provider's last note and plan of care reviewed? yes  Request forwarded to provider for review.

## 2019-09-24 RX ORDER — OXYCODONE AND ACETAMINOPHEN 10; 325 MG/1; MG/1
1 TABLET ORAL
Qty: 150 TAB | Refills: 0 | Status: SHIPPED | OUTPATIENT
Start: 2019-09-29 | End: 2019-10-23 | Stop reason: SDUPTHER

## 2019-09-24 NOTE — TELEPHONE ENCOUNTER
Please educate patient that they are required to call our office to disclose of any controlled substance written by an outside provider within 72 days as stated in his OCA.

## 2019-10-23 ENCOUNTER — OFFICE VISIT (OUTPATIENT)
Dept: PAIN MANAGEMENT | Age: 59
End: 2019-10-23

## 2019-10-23 VITALS
OXYGEN SATURATION: 97 % | BODY MASS INDEX: 30.38 KG/M2 | HEIGHT: 71 IN | SYSTOLIC BLOOD PRESSURE: 180 MMHG | HEART RATE: 65 BPM | RESPIRATION RATE: 16 BRPM | DIASTOLIC BLOOD PRESSURE: 102 MMHG | TEMPERATURE: 97.1 F | WEIGHT: 217 LBS

## 2019-10-23 DIAGNOSIS — M54.81 BILATERAL OCCIPITAL NEURALGIA: ICD-10-CM

## 2019-10-23 DIAGNOSIS — M50.30 DEGENERATION OF CERVICAL INTERVERTEBRAL DISC: ICD-10-CM

## 2019-10-23 DIAGNOSIS — G89.4 CHRONIC PAIN SYNDROME: ICD-10-CM

## 2019-10-23 DIAGNOSIS — G24.3 SPASMODIC TORTICOLLIS: ICD-10-CM

## 2019-10-23 DIAGNOSIS — M54.2 CERVICALGIA: Primary | ICD-10-CM

## 2019-10-23 DIAGNOSIS — M47.22 OSTEOARTHRITIS OF SPINE WITH RADICULOPATHY, CERVICAL REGION: ICD-10-CM

## 2019-10-23 DIAGNOSIS — Z79.899 ENCOUNTER FOR LONG-TERM (CURRENT) USE OF HIGH-RISK MEDICATION: ICD-10-CM

## 2019-10-23 DIAGNOSIS — M79.10 MYALGIA: ICD-10-CM

## 2019-10-23 DIAGNOSIS — M54.12 CERVICAL RADICULOPATHY: ICD-10-CM

## 2019-10-23 DIAGNOSIS — M48.02 SPINAL STENOSIS IN CERVICAL REGION: ICD-10-CM

## 2019-10-23 LAB
ALCOHOL UR POC: NORMAL
AMPHETAMINES UR POC: NEGATIVE
BARBITURATES UR POC: NORMAL
BENZODIAZEPINES UR POC: NORMAL
BUPRENORPHINE UR POC: NEGATIVE
CANNABINOIDS UR POC: NEGATIVE
CARISOPRODOL UR POC: NORMAL
COCAINE UR POC: NEGATIVE
FENTANYL UR POC: NORMAL
MDMA/ECSTASY UR POC: NORMAL
METHADONE UR POC: NEGATIVE
METHAMPHETAMINE UR POC: NORMAL
METHYLPHENIDATE UR POC: NORMAL
OPIATES UR POC: NEGATIVE
OXYCODONE UR POC: NORMAL
PHENCYCLIDINE UR POC: NORMAL
PROPOXYPHENE UR POC: NORMAL
TRAMADOL UR POC: NORMAL
TRICYCLICS UR POC: NORMAL

## 2019-10-23 RX ORDER — CYCLOBENZAPRINE HCL 5 MG
5 TABLET ORAL
Qty: 90 TAB | Refills: 2 | Status: SHIPPED | OUTPATIENT
Start: 2019-10-23

## 2019-10-23 RX ORDER — DULOXETIN HYDROCHLORIDE 30 MG/1
30 CAPSULE, DELAYED RELEASE ORAL 2 TIMES DAILY
Qty: 60 CAP | Refills: 2 | Status: SHIPPED | OUTPATIENT
Start: 2019-10-23

## 2019-10-23 RX ORDER — OXYCODONE AND ACETAMINOPHEN 10; 325 MG/1; MG/1
1 TABLET ORAL
Qty: 150 TAB | Refills: 0 | Status: SHIPPED | OUTPATIENT
Start: 2019-10-30 | End: 2019-11-20 | Stop reason: SDUPTHER

## 2019-10-23 RX ORDER — PREGABALIN 50 MG/1
50 CAPSULE ORAL 2 TIMES DAILY
Qty: 60 CAP | Refills: 2 | Status: SHIPPED | OUTPATIENT
Start: 2019-10-23

## 2019-10-23 RX ORDER — LIDOCAINE 50 MG/G
1 PATCH TOPICAL EVERY 24 HOURS
Qty: 30 PATCH | Refills: 2 | Status: SHIPPED | OUTPATIENT
Start: 2019-10-23

## 2019-10-23 NOTE — PATIENT INSTRUCTIONS
High Blood Pressure: Care Instructions  Overview    It's normal for blood pressure to go up and down throughout the day. But if it stays up, you have high blood pressure. Another name for high blood pressure is hypertension. Despite what a lot of people think, high blood pressure usually doesn't cause headaches or make you feel dizzy or lightheaded. It usually has no symptoms. But it does increase your risk of stroke, heart attack, and other problems. You and your doctor will talk about your risks of these problems based on your blood pressure. Your doctor will give you a goal for your blood pressure. Your goal will be based on your health and your age. Lifestyle changes, such as eating healthy and being active, are always important to help lower blood pressure. You might also take medicine to reach your blood pressure goal.  Follow-up care is a key part of your treatment and safety. Be sure to make and go to all appointments, and call your doctor if you are having problems. It's also a good idea to know your test results and keep a list of the medicines you take. How can you care for yourself at home? Medical treatment  · If you stop taking your medicine, your blood pressure will go back up. You may take one or more types of medicine to lower your blood pressure. Be safe with medicines. Take your medicine exactly as prescribed. Call your doctor if you think you are having a problem with your medicine. · Talk to your doctor before you start taking aspirin every day. Aspirin can help certain people lower their risk of a heart attack or stroke. But taking aspirin isn't right for everyone, because it can cause serious bleeding. · See your doctor regularly. You may need to see the doctor more often at first or until your blood pressure comes down. · If you are taking blood pressure medicine, talk to your doctor before you take decongestants or anti-inflammatory medicine, such as ibuprofen.  Some of these medicines can raise blood pressure. · Learn how to check your blood pressure at home. Lifestyle changes  · Stay at a healthy weight. This is especially important if you put on weight around the waist. Losing even 10 pounds can help you lower your blood pressure. · If your doctor recommends it, get more exercise. Walking is a good choice. Bit by bit, increase the amount you walk every day. Try for at least 30 minutes on most days of the week. You also may want to swim, bike, or do other activities. · Avoid or limit alcohol. Talk to your doctor about whether you can drink any alcohol. · Try to limit how much sodium you eat to less than 2,300 milligrams (mg) a day. Your doctor may ask you to try to eat less than 1,500 mg a day. · Eat plenty of fruits (such as bananas and oranges), vegetables, legumes, whole grains, and low-fat dairy products. · Lower the amount of saturated fat in your diet. Saturated fat is found in animal products such as milk, cheese, and meat. Limiting these foods may help you lose weight and also lower your risk for heart disease. · Do not smoke. Smoking increases your risk for heart attack and stroke. If you need help quitting, talk to your doctor about stop-smoking programs and medicines. These can increase your chances of quitting for good. When should you call for help? Call  911 anytime you think you may need emergency care. This may mean having symptoms that suggest that your blood pressure is causing a serious heart or blood vessel problem. Your blood pressure may be over 180/120.   For example, call  911 if:    · You have symptoms of a heart attack. These may include:  ? Chest pain or pressure, or a strange feeling in the chest.  ? Sweating. ? Shortness of breath. ? Nausea or vomiting. ? Pain, pressure, or a strange feeling in the back, neck, jaw, or upper belly or in one or both shoulders or arms. ? Lightheadedness or sudden weakness.   ? A fast or irregular heartbeat.     · You have symptoms of a stroke. These may include:  ? Sudden numbness, tingling, weakness, or loss of movement in your face, arm, or leg, especially on only one side of your body. ? Sudden vision changes. ? Sudden trouble speaking. ? Sudden confusion or trouble understanding simple statements. ? Sudden problems with walking or balance. ? A sudden, severe headache that is different from past headaches.     · You have severe back or belly pain.    Do not wait until your blood pressure comes down on its own. Get help right away.   Call your doctor now or seek immediate care if:    · Your blood pressure is much higher than normal (such as 180/120 or higher), but you don't have symptoms.     · You think high blood pressure is causing symptoms, such as:  ? Severe headache.  ? Blurry vision.    Watch closely for changes in your health, and be sure to contact your doctor if:    · Your blood pressure measures higher than your doctor recommends at least 2 times. That means the top number is higher or the bottom number is higher, or both.     · You think you may be having side effects from your blood pressure medicine. Where can you learn more? Go to http://angie-nile.info/. Enter D019 in the search box to learn more about \"High Blood Pressure: Care Instructions. \"  Current as of: April 9, 2019  Content Version: 12.2  © 2467-3705 [employer]. Care instructions adapted under license by [employer] (which disclaims liability or warranty for this information). If you have questions about a medical condition or this instruction, always ask your healthcare professional. [address] any warranty or liability for your use of this information.

## 2019-10-23 NOTE — PROGRESS NOTES
Referral date 4/30/2003, source Dr Benson Madrid and for neck pain. Calculated MEQ - 75  Naloxone rescue - yes  Prophylactic bowel program - Movantik  Date of last OCA 4/25/19  Last UDS 4/2/19, consistent  Prior UDS 10/23/19, consistent POC, awaiting confirmatory testing.  date checked today, findings consistent      Today   Last Visit  Prior Visit(s)  NELSY -       0  PGIC -3 and 5   3 & 4   1 & 5    NELSY - 46%  53%   58%    COMM -3   2  2        HPI:  Selin Galloway is a 60 y.o. female here for f/u visit for ongoing evaluation of chronic neck pain related to a work-related injury on 4/21/1992. Pt was last seen here on 07/30/19. Pt denies interval changes on the character or distribution of pain. Pain is located posterior midline cervical region along cervicothoracic junction and along bilateral trapezius region. The pain is described as tightness, aching. She also reports popping and crepitus. Pain at its best is 4-5/10. Pain at its worse is 8-9/10. The pain is worsened by cold/rainy weather. Symptoms are improved by Cymbalta, Percocet, Lyrica, MARILOU and TPI. Gabapentin with negative side effects. Patient on Xanax from outside provider. Since last visit, Patient reports TENS with some roll on cream \"bio ice\" that came with it has been beneficial together. Patient currently has an issue with her wisdom tooth and has a follow-up with the doctor next week. Interventional Pain Procedures:  6/5/19 - cervical epidural steroid injection with Dr Mary Kate Jackson  4/25/19 - bilateral upper trapezius TPI with Dr Mary Kate Jackson  4/11/18 & 7/18/17 - cervical epidural steroid injection with Dr Rowena Pedroza    ROS:  Reports chest pains \"due to stress per patient\", anxiety.   Denies fever, chills, nausea, vomiting, diarrhea, constipation, abdominal pain,shortness or breath/trouble breathing, weakness, trouble swallowing, changes in vision, changes in hearing, falls, dizziness, bladder incontinence, bowel incontinence, depression,  suicidal ideations, homicidal ideations or alcohol use. Opioid specific risk: concurrent benzo use, anxiety, insomnia, obesity, GERD. Vitals:    10/23/19 1136 10/23/19 1156   BP: (!) 188/102 (!) 180/102   Pulse: 65    Resp: 16    Temp: 97.1 °F (36.2 °C)    TempSrc: Oral    SpO2: 97%    Weight: 98.4 kg (217 lb)    Height: 5' 11\" (1.803 m)    PainSc:   4    PainLoc: Neck        Physical exam:  AFVS elevated blood pressure, patient asymptomatic, NAD, mesomorphic body habitus. A&OXs 3. Normocephalic, atraumatic. Conjugate gaze, clear sclerae. Speech is clear and appropriate. Mood is appropriate and patient is cooperative. Gait is within functional limits. Balance is within functional limits. Non-labored breathing. Primary Care Physician  Jose Martin [address]eller [address]  [phone]      PHQ -- .  3 most recent PHQ Screens 10/23/2019   PHQ Not Done -   Little interest or pleasure in doing things Several days   Feeling down, depressed, irritable, or hopeless Several days   Total Score PHQ 2 2           Assessment/Plan:     ICD-10-CM ICD-9-CM    1. Cervicalgia M54.2 723.1 cyclobenzaprine (FLEXERIL) 5 mg tablet      lidocaine (LIDODERM) 5 %      oxyCODONE-acetaminophen (PERCOCET)  mg per tablet   2. Spinal stenosis in cervical region M48.02 723.0 lidocaine (LIDODERM) 5 %      oxyCODONE-acetaminophen (PERCOCET)  mg per tablet   3. Osteoarthritis of spine with radiculopathy, cervical region M47.22 721.0 lidocaine (LIDODERM) 5 %      oxyCODONE-acetaminophen (PERCOCET)  mg per tablet   4. Cervical radiculopathy M54.12 723.4 lidocaine (LIDODERM) 5 %      pregabalin (LYRICA) 50 mg capsule      oxyCODONE-acetaminophen (PERCOCET)  mg per tablet   5. Degeneration of cervical intervertebral disc M50.30 722.4 lidocaine (LIDODERM) 5 %      oxyCODONE-acetaminophen (PERCOCET)  mg per tablet   6. Myalgia M79.10 729.1    7.  Chronic pain syndrome G89.4 338.4 lidocaine (LIDODERM) 5 %      DULoxetine (CYMBALTA) 30 mg capsule      pregabalin (LYRICA) 50 mg capsule      oxyCODONE-acetaminophen (PERCOCET)  mg per tablet   8. Encounter for long-term (current) use of high-risk medication Z79.899 V58.69 DRUG SCREEN      AMB POC DRUG SCREEN ()   9. Bilateral occipital neuralgia M54.81 723.8 cyclobenzaprine (FLEXERIL) 5 mg tablet   10. Spasmodic torticollis G24.3 333.83 cyclobenzaprine (FLEXERIL) 5 mg tablet        --Elevated BP patient asymptomatic, patient advised to follow up with PCP regarding elevated blood pressure and positive findings on review of systems. Patient denies chest pain or shortness of breath. 1) Medications (opiod and non-opiod)  --I do not recommend long term opioid therapy for this patient at this time for their chronic pain; the risks outweigh the potential benefits. Pt currently taking Percocet 10/325mg up to 5 times a day as needed with a total of 150 tabs to be budgeted over 30 days. Their MME is 75. --Today, we will hold the weaning of patients opioid medication with a goal of being opioid free, pending safety and compliance. Pt was educated on signs and symptoms of opioid withdrawal and advised to call the clinic should these symptoms arise so that we may provide support as needed. Pt given prescription for Percocet 10/325mg up to 5 times a day as needed with a total of 150 tabs to be budgeted over 30 days. Their new MME is 75.  --will keep prescription the same and have her follow up with Shamir Macias    ---Lyrica 50mg BID refilled today; consider increasing at next office visit. --Continue Naproxen as previously recommended. --Flexeril, Movantik, Cymbalta and Lidoderm patches refilled today. 2) Restorative Therapies  --Continue TENS unit as needed    --H-wave in office trial ordered last visit, patient  Reports she has not heard anything yet. 3) Interventional Procedures  --Continue Trapezius TPI as needed.     --Per Dr Kyle Brennan note on 4/12/19 \"\"if there is any change in the character or distribution of pain in the cervical spine or upper extremities please request updated cervical MRI. \"\"    4) Behavorial Health Approaches  --Patient may benefit from pain psychology referral in the future. 5) Complementary & Integrative Health  --Continue to independently investigate yoga for persons with chronic pain. --Follow up ongoing assessment and ongoing development of integrative and comprehensive plan of care for chronic pain. Goals: To establish complementary and integrative plan of care to address chronic pain issues while minimizing pharmaceuticals to maximize patient's function improve quality of life. Education:  Patient again educated on the importance of strict compliance with the opioid care agreement while on opioid therapy. Patient also again educated that they should avoid driving while on chronic opioid therapy. Also advised to avoid alcohol and to avoid benzodiazepines while on opioid therapy. Handouts given regarding opioid safety and sleep health. Follow-up and Dispositions    · Return in about 3 months (around 1/23/2020).               Social History     Socioeconomic History    Marital status:      Spouse name: Not on file    Number of children: Not on file    Years of education: Not on file    Highest education level: Not on file   Occupational History    Not on file   Social Needs    Financial resource strain: Not on file    Food insecurity:     Worry: Not on file     Inability: Not on file    Transportation needs:     Medical: Not on file     Non-medical: Not on file   Tobacco Use    Smoking status: Never Smoker    Smokeless tobacco: Never Used   Substance and Sexual Activity    Alcohol use: No    Drug use: No    Sexual activity: Yes     Birth control/protection: Surgical     Comment: Hysterectomy   Lifestyle    Physical activity:     Days per week: Not on file     Minutes per session: Not on file    Stress: Not on file   Relationships    Social connections:     Talks on phone: Not on file     Gets together: Not on file     Attends Faith service: Not on file     Active member of club or organization: Not on file     Attends meetings of clubs or organizations: Not on file     Relationship status: Not on file    Intimate partner violence:     Fear of current or ex partner: Not on file     Emotionally abused: Not on file     Physically abused: Not on file     Forced sexual activity: Not on file   Other Topics Concern    Not on file   Social History Narrative    Not on file     Family History   Problem Relation Age of Onset    Diabetes Mother     Breast Cancer Mother     Heart Attack Other     Diabetes Sister     Diabetes Brother      Allergies   Allergen Reactions    Gabapentin Other (comments)     Body numbness/tingling  Other reaction(s): neurological reaction    Oxycontin [Oxycodone] Not Reported This Time     Pt states not allergic to oxycontin       Past Medical History:   Diagnosis Date    Brachial neuritis or radiculitis NOS     Calculus of kidney     Cervicalgia     Degeneration of cervical intervertebral disc     GERD (gastroesophageal reflux disease)     Hypertension     Ill-defined condition     Gout    Pancreatitis 2012    Thyroid cancer ([facility] [address]      Past Surgical History:   Procedure Laterality Date    HX BREAST BIOPSY      Negative    HX CHOLECYSTECTOMY      HX HYSTERECTOMY      HX LITHOTRIPSY      HX OOPHORECTOMY      HX THYROIDECTOMY      Partial thyroidectomy     Current Outpatient Medications on File Prior to Visit   Medication Sig    ondansetron hcl (ZOFRAN) 4 mg tablet Take 4 mg by mouth as needed.  ALPRAZolam (XANAX) 1 mg tablet Take 1 mg by mouth two (2) times a day.     TENS Units (TENS 504) gladys Please provide patient TENS unit device and pads for chronic neck pain to help improve quality of life and functioning and to decrease pain and medication use.  cloNIDine HCl (CATAPRES) 0.1 mg tablet Take 0.1 mg by mouth two (2) times a day.  hydrALAZINE (APRESOLINE) 50 mg tablet Take 25 mg by mouth three (3) times daily.  ezetimibe (ZETIA) 10 mg tablet Take 10 mg by mouth daily.  rosuvastatin (CRESTOR) 10 mg tablet Take 10 mg by mouth nightly.  levothyroxine (SYNTHROID) 175 mcg tablet Take 175 mcg by mouth Daily (before breakfast).  [DISCONTINUED] oxyCODONE-acetaminophen (PERCOCET)  mg per tablet Take 1 Tab by mouth every four to six (4-6) hours as needed for Pain for up to 30 days. Max Daily Amount: 5 Tabs. Indications: pain    [DISCONTINUED] famotidine (PEPCID) 20 mg tablet Take 20 mg by mouth two (2) times a day.  [DISCONTINUED] penicillin v potassium (VEETID) 500 mg tablet Take 500 mg by mouth four (4) times daily.  [DISCONTINUED] traMADol (ULTRAM) 50 mg tablet Take 50 mg by mouth four (4) times daily.  [DISCONTINUED] lidocaine (LIDODERM) 5 % 1 Patch by TransDERmal route every twenty-four (24) hours. Apply patch to the affected area for 12 hours a day and remove for 12 hours a day.  [DISCONTINUED] cyclobenzaprine (FLEXERIL) 5 mg tablet Take 1 Tab by mouth three (3) times daily as needed for Muscle Spasm(s).  [DISCONTINUED] amLODIPine (NORVASC) 10 mg tablet Take 10 mg by mouth daily.  [DISCONTINUED] ergocalciferol (ERGOCALCIFEROL) 50,000 unit capsule Take 1 Cap by mouth as directed. Once  a week     No current facility-administered medications on file prior to visit. [address] was used for portions of this report. Unintended errors may occur.

## 2019-10-23 NOTE — PROGRESS NOTES
Nursing Notes    Patient presents to the office today in follow-up. Patient rates her pain at 4/10 on the numerical pain scale. Reviewed medications with counts as follows:    Rx Date filled Qty Dispensed Pill Count Last Dose Short   Percocet 10/325 mg tab 9/30/19 150 38 today no                                       reviewed YES  Any aberrancies noted on  NO  Last opioid agreement 4/25/19  Last urine drug screen today    Comments:     POC UDS was performed in office today per verbal order and correct read back from provider. Any new labs or imaging since last appointment? NO    Have you been to an emergency room (ER) or urgent care clinic since your last visit? NO            Have you been hospitalized since your last visit? NO     If yes, where, when, and reason for visit? Have you seen or consulted any other health care providers outside of the [address] [place] [address]  since your last visit? YES, Dentist     If yes, where, when, and reason for visit? Ms. Giulia Jade has a reminder for a \"due or due soon\" health maintenance. I have asked that she contact her primary care provider for follow-up on this health maintenance.     3 most recent PHQ Screens 10/23/2019   PHQ Not Done -   Little interest or pleasure in doing things Several days   Feeling down, depressed, irritable, or hopeless Several days   Total Score PHQ 2 2

## 2019-11-20 DIAGNOSIS — M47.22 OSTEOARTHRITIS OF SPINE WITH RADICULOPATHY, CERVICAL REGION: ICD-10-CM

## 2019-11-20 DIAGNOSIS — G89.4 CHRONIC PAIN SYNDROME: ICD-10-CM

## 2019-11-20 DIAGNOSIS — M48.02 SPINAL STENOSIS IN CERVICAL REGION: ICD-10-CM

## 2019-11-20 DIAGNOSIS — M54.2 CERVICALGIA: ICD-10-CM

## 2019-11-20 DIAGNOSIS — M50.30 DEGENERATION OF CERVICAL INTERVERTEBRAL DISC: ICD-10-CM

## 2019-11-20 DIAGNOSIS — M54.12 CERVICAL RADICULOPATHY: ICD-10-CM

## 2019-11-20 NOTE — TELEPHONE ENCOUNTER
Malorie De La Torre has called requesting a refill of their controlled medication, Percocet, for the management of chronic pain. Last office visit date: 10/23/19  Last opioid care agreement 4/25/19  Last UDS was done 4/2/19    Date last  was pulled and reviewed : 11/20/19  Last fill date for medication was 10/30/19    Was the patient compliant when the above report was pulled? yes    Analgesia: 60%    Aberrancies: none    ADL's: yes  Adverse Reaction: none    Provider's last note and plan of care reviewed? yes  Request forwarded to provider for review.

## 2019-11-21 RX ORDER — OXYCODONE AND ACETAMINOPHEN 10; 325 MG/1; MG/1
1 TABLET ORAL
Qty: 150 TAB | Refills: 0 | Status: SHIPPED | OUTPATIENT
Start: 2019-11-29 | End: 2019-12-29

## 2019-11-21 NOTE — TELEPHONE ENCOUNTER
Spoke with patient after getting 2 points of identity informing patient she has a script ready to pickup at the office and she needs to speak with the provider before she gets the script. Please come by 3:45pm Monday thru Friday with a valid picture ID and patient stated she will come tomorrow.

## 2019-11-29 DIAGNOSIS — Z79.899 ENCOUNTER FOR LONG-TERM (CURRENT) USE OF HIGH-RISK MEDICATION: Primary | ICD-10-CM

## 2019-11-29 RX ORDER — PROMETHAZINE HYDROCHLORIDE 12.5 MG/1
12.5 TABLET ORAL
Qty: 12 TAB | Refills: 0 | Status: SHIPPED | OUTPATIENT
Start: 2019-11-29

## 2019-11-29 RX ORDER — CLONIDINE HYDROCHLORIDE 0.1 MG/1
0.1 TABLET ORAL
Qty: 8 TAB | Refills: 0 | Status: SHIPPED | OUTPATIENT
Start: 2019-11-29

## 2019-11-29 RX ORDER — HYDROXYZINE 25 MG/1
25 TABLET, FILM COATED ORAL
Qty: 12 TAB | Refills: 0 | Status: SHIPPED | OUTPATIENT
Start: 2019-11-29

## 2019-11-29 NOTE — PROGRESS NOTES
Today patient is provided with Percocet 10/325 up to 5 times daily with 150 tablets provided for 30 days. They were informed of the planned clinic closure of 12/12/19 and that this will be the last prescription of narcotic provided from this clinic. Patient was educated on signs and symptoms of opioid withdrawal and a withdrawal cocktail was provided to be used if needed. They were also advised they can go to the nearest ED for further withdrawal support if needed. They were referred to workers comp  to help research where her next pain management referral needs to be sent. They were advised to call their primary care provider today to inform them of the planned clinic closure so that they may anticipate providing support for her pain while awaiting establishment at the new clinic. Patient expresses understanding and agreement with the plan.     Withdrawal cocktail:  Phenergan 25mg, one PO Q8hrs PRN nausea, vomiting #12  Atarax 25mg, one PO Q8hrs PRN anxiety, pruritus, irritation #12  Clonidine 0.1mg, one PO Q12hrs PRN sweating, restlessness, hot flashes #8

## 2019-12-04 ENCOUNTER — DOCUMENTATION ONLY (OUTPATIENT)
Dept: PAIN MANAGEMENT | Age: 59
End: 2019-12-04

## 2020-02-29 ENCOUNTER — APPOINTMENT (OUTPATIENT)
Dept: GENERAL RADIOLOGY | Age: 60
End: 2020-02-29
Attending: PHYSICIAN ASSISTANT
Payer: MEDICARE

## 2020-02-29 ENCOUNTER — HOSPITAL ENCOUNTER (EMERGENCY)
Age: 60
Discharge: HOME | End: 2020-02-29
Attending: EMERGENCY MEDICINE
Payer: MEDICARE

## 2020-02-29 VITALS
HEIGHT: 71 IN | DIASTOLIC BLOOD PRESSURE: 87 MMHG | BODY MASS INDEX: 30.38 KG/M2 | SYSTOLIC BLOOD PRESSURE: 175 MMHG | OXYGEN SATURATION: 98 % | TEMPERATURE: 97.9 F | HEART RATE: 78 BPM | WEIGHT: 217 LBS | RESPIRATION RATE: 16 BRPM

## 2020-02-29 DIAGNOSIS — L98.9 LESION OF SKIN OF NOSE: ICD-10-CM

## 2020-02-29 DIAGNOSIS — R06.2 WHEEZING: ICD-10-CM

## 2020-02-29 DIAGNOSIS — J01.90 ACUTE NON-RECURRENT SINUSITIS, UNSPECIFIED LOCATION: Primary | ICD-10-CM

## 2020-02-29 PROCEDURE — 74011000250 HC RX REV CODE- 250: Performed by: PHYSICIAN ASSISTANT

## 2020-02-29 PROCEDURE — 99284 EMERGENCY DEPT VISIT MOD MDM: CPT

## 2020-02-29 PROCEDURE — 94640 AIRWAY INHALATION TREATMENT: CPT

## 2020-02-29 PROCEDURE — 71046 X-RAY EXAM CHEST 2 VIEWS: CPT

## 2020-02-29 RX ORDER — AMOXICILLIN AND CLAVULANATE POTASSIUM 875; 125 MG/1; MG/1
1 TABLET, FILM COATED ORAL 2 TIMES DAILY
Qty: 20 TAB | Refills: 0 | Status: SHIPPED | OUTPATIENT
Start: 2020-02-29 | End: 2020-02-29

## 2020-02-29 RX ORDER — VALACYCLOVIR HYDROCHLORIDE 1 G/1
1000 TABLET, FILM COATED ORAL 3 TIMES DAILY
Qty: 21 TAB | Refills: 0 | Status: SHIPPED | OUTPATIENT
Start: 2020-02-29 | End: 2020-03-07

## 2020-02-29 RX ORDER — AMOXICILLIN AND CLAVULANATE POTASSIUM 875; 125 MG/1; MG/1
1 TABLET, FILM COATED ORAL 2 TIMES DAILY
Qty: 20 TAB | Refills: 0 | Status: SHIPPED | OUTPATIENT
Start: 2020-02-29 | End: 2020-03-10

## 2020-02-29 RX ORDER — VALACYCLOVIR HYDROCHLORIDE 1 G/1
1000 TABLET, FILM COATED ORAL 3 TIMES DAILY
Qty: 21 TAB | Refills: 0 | Status: SHIPPED | OUTPATIENT
Start: 2020-02-29 | End: 2020-02-29

## 2020-02-29 RX ORDER — IPRATROPIUM BROMIDE AND ALBUTEROL SULFATE 2.5; .5 MG/3ML; MG/3ML
3 SOLUTION RESPIRATORY (INHALATION)
Status: COMPLETED | OUTPATIENT
Start: 2020-02-29 | End: 2020-02-29

## 2020-02-29 RX ORDER — PREDNISONE 10 MG/1
TABLET ORAL
Qty: 21 TAB | Refills: 0 | Status: SHIPPED | OUTPATIENT
Start: 2020-02-29

## 2020-02-29 RX ORDER — PREDNISONE 10 MG/1
TABLET ORAL
Qty: 21 TAB | Refills: 0 | Status: SHIPPED | OUTPATIENT
Start: 2020-02-29 | End: 2020-02-29

## 2020-02-29 RX ADMIN — IPRATROPIUM BROMIDE AND ALBUTEROL SULFATE 3 ML: .5; 3 SOLUTION RESPIRATORY (INHALATION) at 16:04

## 2020-02-29 NOTE — DISCHARGE INSTRUCTIONS
Patient Education        Sinusitis: Care Instructions  Your Care Instructions    Sinusitis is an infection of the lining of the sinus cavities in your head. Sinusitis often follows a cold. It causes pain and pressure in your head and face. In most cases, sinusitis gets better on its own in 1 to 2 weeks. But some mild symptoms may last for several weeks. Sometimes antibiotics are needed. Follow-up care is a key part of your treatment and safety. Be sure to make and go to all appointments, and call your doctor if you are having problems. It's also a good idea to know your test results and keep a list of the medicines you take. How can you care for yourself at home? · Take an over-the-counter pain medicine, such as acetaminophen (Tylenol), ibuprofen (Advil, Motrin), or naproxen (Aleve). Read and follow all instructions on the label. · If the doctor prescribed antibiotics, take them as directed. Do not stop taking them just because you feel better. You need to take the full course of antibiotics. · Be careful when taking over-the-counter cold or flu medicines and Tylenol at the same time. Many of these medicines have acetaminophen, which is Tylenol. Read the labels to make sure that you are not taking more than the recommended dose. Too much acetaminophen (Tylenol) can be harmful. · Breathe warm, moist air from a steamy shower, a hot bath, or a sink filled with hot water. Avoid cold, dry air. Using a humidifier in your home may help. Follow the directions for cleaning the machine. · Use saline (saltwater) nasal washes to help keep your nasal passages open and wash out mucus and bacteria. You can buy saline nose drops at a grocery store or drugstore. Or you can make your own at home by adding 1 teaspoon of salt and 1 teaspoon of baking soda to 2 cups of distilled water. If you make your own, fill a bulb syringe with the solution, insert the tip into your nostril, and squeeze gently. Duana Glow your nose.   · Put a hot, wet towel or a warm gel pack on your face 3 or 4 times a day for 5 to 10 minutes each time. · Try a decongestant nasal spray like oxymetazoline (Afrin). Do not use it for more than 3 days in a row. Using it for more than 3 days can make your congestion worse. When should you call for help? Call your doctor now or seek immediate medical care if:    · You have new or worse swelling or redness in your face or around your eyes.     · You have a new or higher fever.    Watch closely for changes in your health, and be sure to contact your doctor if:    · You have new or worse facial pain.     · The mucus from your nose becomes thicker (like pus) or has new blood in it.     · You are not getting better as expected. Where can you learn more? Go to http://angie-nile.info/. Enter Q609 in the search box to learn more about \"Sinusitis: Care Instructions. \"  Current as of: October 21, 2018  Content Version: 12.2  © 0507-2060 [employer]. Care instructions adapted under license by [employer] (which disclaims liability or warranty for this information). If you have questions about a medical condition or this instruction, always ask your healthcare professional. [place] [address] any warranty or liability for your use of this information. Patient Education        Wheezing or Bronchoconstriction: Care Instructions  Your Care Instructions  Wheezing is a whistling noise made during breathing. It occurs when the small airways, or bronchial tubes, that lead to your lungs swell or contract (spasm) and become narrow. This narrowing is called bronchoconstriction. When your airways constrict, it is hard for air to pass through and this makes it hard for you to breathe. Wheezing and bronchoconstriction can be caused by many problems, including:  · An infection such as the flu or a cold. · Allergies such as hay fever.   · Diseases such as asthma or chronic obstructive pulmonary disease. · Smoking. Treatment for your wheezing depends on what is causing the problem. Your wheezing may get better without treatment. But you may need to pay attention to things that cause your wheezing and avoid them. Or you may need medicine to help treat the wheezing and to reduce the swelling or to relieve spasms in your lungs. Follow-up care is a key part of your treatment and safety. Be sure to make and go to all appointments, and call your doctor if you are having problems. It is also a good idea to know your test results and keep a list of the medicines you take. How can you care for yourself at home? · Take your medicine exactly as prescribed. Call your doctor if you think you are having a problem with your medicine. You will get more details on the specific medicine your doctor prescribes. · If your doctor prescribed antibiotics, take them as directed. Do not stop taking them just because you feel better. You need to take the full course of antibiotics. · Breathe moist air from a humidifier, hot shower, or sink filled with hot water. This may help ease your symptoms and make it easier for you to breathe. · If you have congestion in your nose and throat, drinking plenty of fluids, especially hot fluids, may help relieve your symptoms. If you have kidney, heart, or liver disease and have to limit fluids, talk with your doctor before you increase the amount of fluids you drink. · If you have mucus in your airways, it may help to breathe deeply and cough. · Do not smoke or allow others to smoke around you. Smoking can make your wheezing worse. If you need help quitting, talk to your doctor about stop-smoking programs and medicines. These can increase your chances of quitting for good. · Avoid things that may cause your wheezing. These may include colds, smoke, air pollution, dust, pollen, pets, cockroaches, stress, and cold air. When should you call for help?   Call [facility] [employer] anytime you think you may need emergency care. For example, call if:    · You have severe trouble breathing.     · You passed out (lost consciousness).    Call your doctor now or seek immediate medical care if:    · You cough up yellow, dark brown, or bloody mucus (sputum).     · You have new or worse shortness of breath.     · Your wheezing is not getting better or it gets worse after you start taking your medicine.    Watch closely for changes in your health, and be sure to contact your doctor if:    · You do not get better as expected. Where can you learn more? Go to http://angie-nile.info/. Enter 454 [id number] in the search box to learn more about \"Wheezing or Bronchoconstriction: Care Instructions. \"  Current as of: June 9, 2019  Content Version: 12.2  © 1813-8617 [employer]. Care instructions adapted under license by [employer] (which disclaims liability or warranty for this information). If you have questions about a medical condition or this instruction, always ask your healthcare professional. [address] any warranty or liability for your use of this information.

## 2020-02-29 NOTE — ED TRIAGE NOTES
Patient reports she has had a headache for 3rd day. have congestion. I have blister on my nose and my balance has been off.   When I walk I have diarrhea

## 2020-02-29 NOTE — ED PROVIDER NOTES
EMERGENCY DEPARTMENT HISTORY AND PHYSICAL EXAM    Date: 2/29/2020  Patient Name: Ismael Humphrey    History of Presenting Illness     Chief Complaint   Patient presents with    Headache    Chest Congestion    Diarrhea         History Provided By: Patient    15:41 PM  Ismael Humphrey is a 60 y.o. female with PMHX of HTN, chronic pain who presents to the emergency department C/O frontal headache onset 2 days ago. Patient reports onset of nasal congestion, cough, and chest congestion 18 to 19 days ago. Symptoms began approximately 2 days after returning from a [address]. She was seen at [facility] on 2/14/2020, had negative chest x-ray, told she had a flulike illness and has been taking cough medication and albuterol inhaler, without relief. Over the past 2 days patient has noticed nasal congestion, facial pressure and bilateral frontal headache, left worse than right and noticed a blister to her left nare yesterday. Cough sputum and nasal discharge is yellow. Pt denies fever, ear pain, hearing changes, eye pain, vision changes, facial numbness, neck pain, and any other sxs or complaints. PCP: Patti Sequeira MD    Current Outpatient Medications   Medication Sig Dispense Refill    amoxicillin-clavulanate (AUGMENTIN) 875-125 mg per tablet Take 1 Tab by mouth two (2) times a day for 10 days. 20 Tab 0    valACYclovir (VALTREX) 1 gram tablet Take 1 Tab by mouth three (3) times daily for 7 days. 21 Tab 0    predniSONE (STERAPRED DS) 10 mg dose pack Use per pack instructions. 21 Tab 0    hydrOXYzine HCl (ATARAX) 25 mg tablet Take 1 Tab by mouth every eight (8) hours as needed for Itching (itching, irritation, anxiety - PRN opiate withdrawl) for up to 12 doses. 12 Tab 0    cloNIDine HCl (CATAPRES) 0.1 mg tablet Take 1 Tab by mouth every twelve (12) hours as needed (restlessness, hot flashes, sweats - PRN opiate withdrawl) for up to 8 doses.  8 Tab 0    promethazine (PHENERGAN) 12.5 mg tablet Take 1 Tab by mouth every eight (8) hours as needed for Nausea (nausea, vomiting - opiate withdrawl PRN) for up to 12 doses. 12 Tab 0    oxyCODONE-acetaminophen (PERCOCET)  mg per tablet Take 1 Tab by mouth every four to six (4-6) hours as needed for Pain for up to 30 days. Max Daily Amount: 5 Tabs. Indications: pain 150 Tab 0    cyclobenzaprine (FLEXERIL) 5 mg tablet Take 1 Tab by mouth three (3) times daily as needed for Muscle Spasm(s). 90 Tab 2    lidocaine (LIDODERM) 5 % 1 Patch by TransDERmal route every twenty-four (24) hours. 30 Patch 2    DULoxetine (CYMBALTA) 30 mg capsule Take 1 Cap by mouth two (2) times a day. 60 Cap 2    pregabalin (LYRICA) 50 mg capsule Take 1 Cap by mouth two (2) times a day. Max Daily Amount: 100 mg. 60 Cap 2    ondansetron hcl (ZOFRAN) 4 mg tablet Take 4 mg by mouth as needed.  ALPRAZolam (XANAX) 1 mg tablet Take 1 mg by mouth two (2) times a day.  0    TENS Units (TENS 504) gladys Please provide patient TENS unit device and pads for chronic neck pain to help improve quality of life and functioning and to decrease pain and medication use. 1 Device 0    cloNIDine HCl (CATAPRES) 0.1 mg tablet Take 0.1 mg by mouth two (2) times a day.  hydrALAZINE (APRESOLINE) 50 mg tablet Take 25 mg by mouth three (3) times daily.  ezetimibe (ZETIA) 10 mg tablet Take 10 mg by mouth daily. 0    rosuvastatin (CRESTOR) 10 mg tablet Take 10 mg by mouth nightly.  levothyroxine (SYNTHROID) 175 mcg tablet Take 175 mcg by mouth Daily (before breakfast).          Past History     Past Medical History:  Past Medical History:   Diagnosis Date    Brachial neuritis or radiculitis NOS     Calculus of kidney     Cervicalgia     Degeneration of cervical intervertebral disc     GERD (gastroesophageal reflux disease)     Hypertension     Ill-defined condition     Gout    Pancreatitis 2012    Thyroid [facility])        Past Surgical History:  Past Surgical History: Procedure Laterality Date    HX BREAST BIOPSY      Negative    HX CHOLECYSTECTOMY      HX HYSTERECTOMY      HX LITHOTRIPSY      HX OOPHORECTOMY      HX THYROIDECTOMY      Partial thyroidectomy       Family History:  Family History   Problem Relation Age of Onset    Diabetes Mother     Breast Cancer Mother     Heart Attack Other     Diabetes Sister     Diabetes Brother        Social History:  Social History     Tobacco Use    Smoking status: Never Smoker    Smokeless tobacco: Never Used   Substance Use Topics    Alcohol use: No    Drug use: No       Allergies: Allergies   Allergen Reactions    Gabapentin Other (comments)     Body numbness/tingling  Other reaction(s): neurological reaction    Oxycontin [Oxycodone] Not Reported This Time     Pt states not allergic to oxycontin           Review of Systems   Review of Systems   HENT: Positive for congestion. Negative for sore throat. Respiratory: Positive for cough and wheezing. Neurological: Positive for headaches. All other systems reviewed and are negative. Physical Exam     Vitals:    02/29/20 1531 02/29/20 1559   BP: 175/87    Pulse: 78    Resp: 16    Temp: 97.9 °F (36.6 °C)    SpO2: 98% 98%   Weight: 98.4 kg (217 lb)    Height: 5' 11\" (1.803 m)      Physical Exam  Vitals signs and nursing note reviewed. Constitutional:       General: She is not in acute distress. Appearance: Normal appearance. She is normal weight. HENT:      Head: Normocephalic and atraumatic. Right Ear: Tympanic membrane, ear canal and external ear normal.      Left Ear: Tympanic membrane, ear canal and external ear normal.      Nose:        Mouth/Throat:      Mouth: Mucous membranes are moist.      Pharynx: Oropharynx is clear. No oropharyngeal exudate or posterior oropharyngeal erythema. Eyes:      Extraocular Movements: Extraocular movements intact.       Conjunctiva/sclera: Conjunctivae normal.      Pupils: Pupils are equal, round, and reactive to light. Cardiovascular:      Rate and Rhythm: Normal rate and regular rhythm. Pulmonary:      Effort: Pulmonary effort is normal.      Breath sounds: No stridor. Wheezing (mild expiratory wheezes bilaterally) present. No rhonchi or rales. Skin:     General: Skin is warm and dry. Neurological:      General: No focal deficit present. Mental Status: She is alert and oriented to person, place, and time. Diagnostic Study Results     Labs -   No results found for this or any previous visit (from the past 12 hour(s)). Radiologic Studies -   Chest x-ray shows no acute process  Pending review by radiologist  XR CHEST PA LAT    (Results Pending)     CT Results  (Last 48 hours)    None        CXR Results  (Last 48 hours)    None          Medications given in the ED-  Medications   albuterol-ipratropium (DUO-NEB) 2.5 MG-0.5 MG/3 ML (3 mL Nebulization Given 2/29/20 0804)         Medical Decision Making   I am the first provider for this patient. I reviewed the vital signs, available nursing notes, past medical history, past surgical history, family history and social history. Vital Signs-Reviewed the patient's vital signs. Records Reviewed: Nursing Notes      Procedures:  Procedures    ED Course:  15:41 PM   Initial assessment performed. The patients presenting problems have been discussed, and they are in agreement with the care plan formulated and outlined with them. I have encouraged them to ask questions as they arise throughout their visit. Provider Notes (Medical Decision Making): Octavio Mercado is a 60 y.o. female presents with 2 and half weeks of persistent URI symptoms with wheezing nasal congestion now headache. She also complained of a mildly painful rash to left nare, which she later stated she gets from time to time and is treated by her PCP with some type of cream (probably Abreva).   Therefore less likely Pryor sign, without other evidence of zoster or ocular involvement, but will cover with valacyclovir. Her history exam is most consistent with a sinusitis so will place on Augmentin, steroid which will also help with wheezing. Close PCP follow up. Diagnosis and Disposition       DISCHARGE NOTE:    María Schilling  results have been reviewed with her. She has been counseled regarding her diagnosis, treatment, and plan. She verbally conveys understanding and agreement of the signs, symptoms, diagnosis, treatment and prognosis and additionally agrees to follow up as discussed. She also agrees with the care-plan and conveys that all of her questions have been answered. I have also provided discharge instructions for her that include: educational information regarding their diagnosis and treatment, and list of reasons why they would want to return to the ED prior to their follow-up appointment, should her condition change. She has been provided with education for proper emergency department utilization. CLINICAL IMPRESSION:    1. Acute non-recurrent sinusitis, unspecified location    2. Lesion of skin of nose    3. Wheezing        PLAN:  1. D/C Home  2. Discharge Medication List as of 2/29/2020  4:44 PM      START taking these medications    Details   amoxicillin-clavulanate (AUGMENTIN) 875-125 mg per tablet Take 1 Tab by mouth two (2) times a day for 10 days. , Normal, Disp-20 Tab, R-0      valACYclovir (VALTREX) 1 gram tablet Take 1 Tab by mouth three (3) times daily for 7 days. , Normal, Disp-21 Tab, R-0      predniSONE (STERAPRED DS) 10 mg dose pack Use per pack instructions. , Normal, Disp-21 Tab, R-0         CONTINUE these medications which have NOT CHANGED    Details   hydrOXYzine HCl (ATARAX) 25 mg tablet Take 1 Tab by mouth every eight (8) hours as needed for Itching (itching, irritation, anxiety - PRN opiate withdrawl) for up to 12 doses. , Print, Disp-12 Tab, R-0      !! cloNIDine HCl (CATAPRES) 0.1 mg tablet Take 1 Tab by mouth every twelve (12) hours as needed (restlessness, hot flashes, sweats - PRN opiate withdrawl) for up to 8 doses. , Print, Disp-8 Tab, R-0      promethazine (PHENERGAN) 12.5 mg tablet Take 1 Tab by mouth every eight (8) hours as needed for Nausea (nausea, vomiting - opiate withdrawl PRN) for up to 12 doses. , Print, Disp-12 Tab, R-0      oxyCODONE-acetaminophen (PERCOCET)  mg per tablet Take 1 Tab by mouth every four to six (4-6) hours as needed for Pain for up to 30 days. Max Daily Amount: 5 Tabs. Indications: pain, Print, Disp-150 Tab, R-0      cyclobenzaprine (FLEXERIL) 5 mg tablet Take 1 Tab by mouth three (3) times daily as needed for Muscle Spasm(s). , Normal, Disp-90 Tab, R-2      lidocaine (LIDODERM) 5 % 1 Patch by TransDERmal route every twenty-four (24) hours. , Normal, Disp-30 Patch, R-2      DULoxetine (CYMBALTA) 30 mg capsule Take 1 Cap by mouth two (2) times a day., Normal, Disp-60 Cap, R-2      pregabalin (LYRICA) 50 mg capsule Take 1 Cap by mouth two (2) times a day. Max Daily Amount: 100 mg., Normal, Disp-60 Cap, R-2      ondansetron hcl (ZOFRAN) 4 mg tablet Take 4 mg by mouth as needed., Historical Med      ALPRAZolam (XANAX) 1 mg tablet Take 1 mg by mouth two (2) times a day., Historical Med, R-0      TENS Units (TENS 504) gladys Please provide patient TENS unit device and pads for chronic neck pain to help improve quality of life and functioning and to decrease pain and medication use., Print, Disp-1 Device, R-0      !! cloNIDine HCl (CATAPRES) 0.1 mg tablet Take 0.1 mg by mouth two (2) times a day., Historical Med      hydrALAZINE (APRESOLINE) 50 mg tablet Take 25 mg by mouth three (3) times daily. , Historical Med      ezetimibe (ZETIA) 10 mg tablet Take 10 mg by mouth daily. , Historical Med, R-0      rosuvastatin (CRESTOR) 10 mg tablet Take 10 mg by mouth nightly., Historical Med      levothyroxine (SYNTHROID) 175 mcg tablet Take 175 mcg by mouth Daily (before breakfast). , Historical Med       !! - Potential duplicate medications found. Please discuss with provider. 3.   Follow-up Information     Follow up With Specialties Details Why Contact Info    Mily Tirado MD Internal Medicine Schedule an appointment as soon as possible for a visit  [place] [address] [facility] [address]  [phone]      [facility] EMERGENCY DEPT Emergency Medicine  As needed, If symptoms worsen [address] [id number]  [phone]        _______________________________      Please note that this dictation was completed with [employer], the computer voice recognition software. Quite often unanticipated grammatical, syntax, homophones, and other interpretive errors are inadvertently transcribed by the computer software. Please disregard these errors. Please excuse any errors that have escaped final proofreading.

## 2020-05-27 NOTE — PROGRESS NOTES
Internal Medicine Progress NoteToday's Date:  2019 Patient:  Maggie Smart Patient :  1960 Subjective: Chief Complaint Patient presents with  Neck Pain  Shoulder Pain  
  left HPI: Maggie Smart is a 60 y.o.  female who presents for follow up. Pt being seen for opioid weaning. Pt last seen by me on  with her wean continuing at that time. She was seen on  by Ortho(Dr. Fernandez Carrera) at which time she received trigger point injections which helped some with her shoulder, but neck pain essentially unchanged. Neck pain at 7-8/10 without medication 5/10 with medication. She states Duloxetine has helped some. She she still wants MARILOU. NOTE: She relates her mother recently in hospital for partial bowel obstruction, she is better now had living with her. Past Medical History:  
Diagnosis Date  Brachial neuritis or radiculitis NOS   
 Calculus of kidney  Cervicalgia  Degeneration of cervical intervertebral disc  GERD (gastroesophageal reflux disease)  Hypertension  Ill-defined condition Gout  Pancreatitis   Thyroid cancer ([facility] [address] Past Surgical History:  
Procedure Laterality Date  HX BREAST BIOPSY Negative  HX CHOLECYSTECTOMY  HX HYSTERECTOMY  HX LITHOTRIPSY  HX OOPHORECTOMY  HX THYROIDECTOMY Partial thyroidectomy REVIEW OF SYSTEMS:  
Constitutional  no wt loss, night sweats, unexplained fevers Oral  no mouth pain, tongue or tooth problems, no swallowing problems Cardiac  no CP, PND, orthopnea, palpitations or syncope Resp  no wheezing, chronic coughing, dyspnea GI  no heartburn, nausea, vomiting,constipation, diarrhea,bleeding.   no dysuria, hematuria, urgency, frequency Ortho  as noted above Derm  no  rashes, lesions. Psych  denies any anxiety or depression symptoms, no hallucinations, suicidal, or violent ideation Med Rec - Admission Neuro  no focal weakness,incoordination, ataxia, involuntary movements Endo - no polyuria, polydipsia, nocturia, hot flashes Calculated MEQ - 80 Naloxone rescue - yes Prophylactic bowel program - yes PHQ-9- 0 
COMM SCORE- 0 
PHQ over the last two weeks 2/1/2019 PHQ Not Done - Little interest or pleasure in doing things Not at all Feeling down, depressed, irritable, or hopeless Not at all Total Score PHQ 2 0 Rubén Montoya MD 
[id number] [address] Current Outpatient Meds and Allergies Current Outpatient Medications on File Prior to Visit Medication Sig Dispense Refill  cyclobenzaprine (FLEXERIL) 5 mg tablet Take 1 Tab by mouth three (3) times daily as needed for Muscle Spasm(s). 90 Tab 1  cloNIDine HCl (CATAPRES) 0.1 mg tablet Take  by mouth two (2) times a day.  hydrALAZINE (APRESOLINE) 50 mg tablet Take 25 mg by mouth three (3) times daily.  ezetimibe (ZETIA) 10 mg tablet Take 10 mg by mouth daily. 0  
 naloxone 4 mg/actuation spry 4 mg by Nasal route once as needed (opioid overdose) for up to 1 dose. May substitute 2 mg syringe if insurance requires 1 Package 0  
 naloxegol (MOVANTIK) 25 mg tab tablet Take 1 Tab by mouth daily. Take daily before breakfast for opioid induced constipation  Indications: OPIOID-INDUCED CONSTIPATION 30 Tab 11  
 Transparent Dressings (TEGADERM) 3 1/2 X 4 \" bndg 2 Patches by Apply Externally route every fourty-eight (48) hours. Dispense two boxes 50 Each 11  
 rosuvastatin (CRESTOR) 10 mg tablet Take 10 mg by mouth nightly.  TENS Units (TENS 502) Deepa 1 Units by Does Not Apply route as directed. 1 Units 0  
 levothyroxine (SYNTHROID) 175 mcg tablet Take 175 mcg by mouth Daily (before breakfast).  [DISCONTINUED] oxyCODONE-acetaminophen (PERCOCET)  mg per tablet Take 1 Tab by mouth every six (6) hours as needed for Pain for up to 30 days. Max Daily Amount: 4 Tabs.  120 Tab 0  
  gabapentin (NEURONTIN) 300 mg capsule 1 tab on day one, 1 tab bid on days two and three, then 1 tab tid on day four and beyond. 90 Cap 0  
 naproxen (NAPROSYN) 500 mg tablet Take 500 mg by mouth daily (with breakfast).  lidocaine (LIDODERM) 5 % 1 Patch by TransDERmal route every twenty-four (24) hours. Apply patch to the affected area for 12 hours a day and remove for 12 hours a day. 1 Each 0  
 amLODIPine (NORVASC) 10 mg tablet Take  by mouth daily.  ergocalciferol (ERGOCALCIFEROL) 50,000 unit capsule Take 1 Cap by mouth as directed. Once  a week 8 Cap 5 No current facility-administered medications on file prior to visit. Allergies Allergen Reactions  Gabapentin Other (comments) Body numbness/tingling  Oxycontin [Oxycodone] Not Reported This Time Pt states not allergic to oxycontin Objective:    
 
BP Readings from Last 3 Encounters:  
02/01/19 (!) 163/94  
01/09/19 158/88  
12/19/18 151/80 VS:   
Visit Vitals BP (!) 163/94 (BP 1 Location: Left arm, BP Patient Position: Sitting) Pulse 65 Temp 97.8 °F (36.6 °C) (Oral) Resp 20 Ht 5' 11\" (1.803 m) Wt 104.3 kg (230 lb) SpO2 99% BMI 32.08 kg/m² Body mass index is 32.08 kg/m². GENERAL: W/D, W/N, Female in no acute distress. APPEARANCE: Well groomed, Appropriately Dressed. ATTITUDE: Pleasant, Cooperative. SPEECH: Normal Rate, Clear. AFFECT: Appropriate, Flattened. MOOD: Euthymic. THOUGHT PROCESS: Linear, Logical, Normal Judgement and Insight. THOUGHT CONTENT: Normal 
MEMORY: Grossly Intact. HEENT -- NCAT, Anicteric sclerae. Mus/Skel--  bulk and tone appear normal. 
Derm-- no obvious abnormalities noted. Results for orders placed or performed in visit on 11/15/18 AMB POC DRUG SCREEN () Result Value Ref Range ALCOHOL UR POC AMPHETAMINES UR POC Negative CARISOPRODOL UR POC    
 COCAINE UR POC Negative  FENTANYL UR POC    
 MDMA/ECSTASY UR POC Negative METHADONE UR POC Negative METHAMPHETAMINE UR POC Negative METHYLPHENIDATE UR POC    
 OPIATES UR POC Negative OXYCODONE UR POC Presumptive Positive PHENCYCLIDINE UR POC PROPOXYPHENE UR POC    
 TRAMADOL UR POC    
 TRICYCLICS UR POC Negative BARBITURATES UR POC Negative BENZODIAZEPINES UR POC Negative CANNABINOIDS UR POC Negative BUPRENORPHINE UR POC Negative Assessment/Plan & Orders:    
I have reviewed this patient's report generated by the [facility] which does not demonstrate aberrancies and inconsistencies with regard to the historical prescribing of controlled medications to this patient by other providers. Problem List Items Addressed This Visit Cervical radiculopathy Relevant Medications DULoxetine (CYMBALTA) 30 mg capsule  
 oxyCODONE-acetaminophen (PERCOCET)  mg per tablet (Start on 3/2/2019)  
 oxyCODONE-acetaminophen (PERCOCET 10)  mg per tablet (Start on 2/2/2019) Cervicalgia Relevant Medications DULoxetine (CYMBALTA) 30 mg capsule  
 oxyCODONE-acetaminophen (PERCOCET)  mg per tablet (Start on 3/2/2019)  
 oxyCODONE-acetaminophen (PERCOCET 10)  mg per tablet (Start on 2/2/2019) Degeneration of cervical intervertebral disc Relevant Medications  
 oxyCODONE-acetaminophen (PERCOCET)  mg per tablet (Start on 3/2/2019)  
 oxyCODONE-acetaminophen (PERCOCET 10)  mg per tablet (Start on 2/2/2019) Osteoarthritis of spine with radiculopathy, cervical region Relevant Medications DULoxetine (CYMBALTA) 30 mg capsule  
 oxyCODONE-acetaminophen (PERCOCET)  mg per tablet (Start on 3/2/2019)  
 oxyCODONE-acetaminophen (PERCOCET 10)  mg per tablet (Start on 2/2/2019) Spinal stenosis in cervical region Relevant Medications  
 oxyCODONE-acetaminophen (PERCOCET)  mg per tablet (Start on 3/2/2019) Other Visit Diagnoses Encounter for long-term (current) use of high-risk medication    -  Primary Relevant Medications  
 oxyCODONE-acetaminophen (PERCOCET 10)  mg per tablet (Start on 2/2/2019) Diagnoses and all orders for this visit: 1. Encounter for long-term (current) use of high-risk medication 
-     oxyCODONE-acetaminophen (PERCOCET 10)  mg per tablet; 1 tab po 5 x daily prn 2. Cervicalgia 
-     DULoxetine (CYMBALTA) 30 mg capsule; Take 1 Cap by mouth two (2) times a day. -     oxyCODONE-acetaminophen (PERCOCET)  mg per tablet; 1 tab po 5 x daily prn 
-     oxyCODONE-acetaminophen (PERCOCET 10)  mg per tablet; 1 tab po 5 x daily prn 3. Degeneration of cervical intervertebral disc 4. Cervical radiculopathy 
-     DULoxetine (CYMBALTA) 30 mg capsule; Take 1 Cap by mouth two (2) times a day. -     oxyCODONE-acetaminophen (PERCOCET)  mg per tablet; 1 tab po 5 x daily prn 
-     oxyCODONE-acetaminophen (PERCOCET 10)  mg per tablet; 1 tab po 5 x daily prn 
 
5. Spinal stenosis in cervical region 
-     oxyCODONE-acetaminophen (PERCOCET)  mg per tablet; 1 tab po 5 x daily prn 
 
6. Osteoarthritis of spine with radiculopathy, cervical region 
-     oxyCODONE-acetaminophen (PERCOCET)  mg per tablet; 1 tab po 5 x daily prn Discontinue Fentanyl patches, increase Duloxetine, and percocet slightly. Will scheduled first available appointment with Dr. Florencia Edwards for possible cervical MARILOU. MME =   75 Post reduction in medication. Follow-up Disposition: 
Return for medication re-evaluation, evaluation for further weaning of medication. Reviewed with patient the treatment plan, goals of treatment plan, and limitations of treatment plan, to include the potential for side effects from medications and procedures.  If side effects occur, it is the responsibility of the patient to inform the clinic so that a change in the treatment plan can be made in a safe manner. The patient is advised that stopping prescribed medication may cause an increase in symptoms and possible medication withdrawal symptoms. The patient is informed an emergency room evaluation may be necessary if this occurs. Had lengthy discussion with Pt regarding the direction of this facility away from opioids being the primary treatment option, and the need for exhaustion all other potential options to address his/ her pain GOALS: 
To establish complementary and integrative plan of care to address chronic pain issues while minimizing pharmaceuticals to maximize patient's function improve quality of life. 
  
Education: 
Patient again educated on the importance of strict compliance with the opioid care agreement while on opioid therapy. Patient also again educated that they should avoid driving while on chronic opioid therapy. Also advised to avoid alcohol and to avoid benzodiazepines while on opioid therapy. Patient verbalized understanding and is in agreement with treatment plan as outlined above. All questions answered. I spent 25 minutes with the patient in face-to-face consultation, of which greater than 50% was spent in counseling and coordination of care as described above.   
 
 
 
Kaur Montaño MD

## 2020-06-06 ENCOUNTER — HOSPITAL ENCOUNTER (EMERGENCY)
Age: 60
Discharge: HOME | End: 2020-06-06
Attending: EMERGENCY MEDICINE
Payer: MEDICARE

## 2020-06-06 ENCOUNTER — APPOINTMENT (OUTPATIENT)
Dept: CT IMAGING | Age: 60
End: 2020-06-06
Attending: EMERGENCY MEDICINE
Payer: MEDICARE

## 2020-06-06 VITALS
HEIGHT: 71 IN | TEMPERATURE: 97.4 F | RESPIRATION RATE: 21 BRPM | WEIGHT: 217 LBS | BODY MASS INDEX: 30.38 KG/M2 | HEART RATE: 65 BPM | SYSTOLIC BLOOD PRESSURE: 176 MMHG | DIASTOLIC BLOOD PRESSURE: 95 MMHG | OXYGEN SATURATION: 96 %

## 2020-06-06 DIAGNOSIS — I10 ESSENTIAL HYPERTENSION: Primary | ICD-10-CM

## 2020-06-06 DIAGNOSIS — R51.9 NONINTRACTABLE EPISODIC HEADACHE, UNSPECIFIED HEADACHE TYPE: ICD-10-CM

## 2020-06-06 LAB
ALBUMIN SERPL-MCNC: 4 G/DL (ref 3.4–5)
ALBUMIN/GLOB SERPL: 1.2 {RATIO} (ref 0.8–1.7)
ALP SERPL-CCNC: 97 U/L (ref 45–117)
ALT SERPL-CCNC: 19 U/L (ref 13–56)
ANION GAP SERPL CALC-SCNC: 5 MMOL/L (ref 3–18)
AST SERPL-CCNC: 17 U/L (ref 10–38)
ATRIAL RATE: 67 BPM
BASOPHILS # BLD: 0 K/UL (ref 0–0.1)
BASOPHILS NFR BLD: 0 % (ref 0–2)
BILIRUB SERPL-MCNC: 0.6 MG/DL (ref 0.2–1)
BUN SERPL-MCNC: 20 MG/DL (ref 7–18)
BUN/CREAT SERPL: 22 (ref 12–20)
CALCIUM SERPL-MCNC: 8.9 MG/DL (ref 8.5–10.1)
CALCULATED P AXIS, ECG09: 68 DEGREES
CALCULATED R AXIS, ECG10: 66 DEGREES
CALCULATED T AXIS, ECG11: 70 DEGREES
CHLORIDE SERPL-SCNC: 110 MMOL/L (ref 100–111)
CK MB CFR SERPL CALC: 0.7 % (ref 0–4)
CK MB SERPL-MCNC: 1.3 NG/ML (ref 5–25)
CK SERPL-CCNC: 176 U/L (ref 26–192)
CO2 SERPL-SCNC: 26 MMOL/L (ref 21–32)
CREAT SERPL-MCNC: 0.92 MG/DL (ref 0.6–1.3)
DIAGNOSIS, 93000: NORMAL
DIFFERENTIAL METHOD BLD: ABNORMAL
EOSINOPHIL # BLD: 0 K/UL (ref 0–0.4)
EOSINOPHIL NFR BLD: 1 % (ref 0–5)
ERYTHROCYTE [DISTWIDTH] IN BLOOD BY AUTOMATED COUNT: 13.2 % (ref 11.6–14.5)
GLOBULIN SER CALC-MCNC: 3.3 G/DL (ref 2–4)
GLUCOSE SERPL-MCNC: 107 MG/DL (ref 74–99)
HCT VFR BLD AUTO: 46.1 % (ref 35–45)
HGB BLD-MCNC: 14.5 G/DL (ref 12–16)
LYMPHOCYTES # BLD: 2.9 K/UL (ref 0.9–3.6)
LYMPHOCYTES NFR BLD: 44 % (ref 21–52)
MCH RBC QN AUTO: 30.8 PG (ref 24–34)
MCHC RBC AUTO-ENTMCNC: 31.5 G/DL (ref 31–37)
MCV RBC AUTO: 97.9 FL (ref 74–97)
MONOCYTES # BLD: 0.5 K/UL (ref 0.05–1.2)
MONOCYTES NFR BLD: 7 % (ref 3–10)
NEUTS SEG # BLD: 3.2 K/UL (ref 1.8–8)
NEUTS SEG NFR BLD: 48 % (ref 40–73)
P-R INTERVAL, ECG05: 190 MS
PLATELET # BLD AUTO: 213 K/UL (ref 135–420)
PMV BLD AUTO: 10.7 FL (ref 9.2–11.8)
POTASSIUM SERPL-SCNC: 3.6 MMOL/L (ref 3.5–5.5)
PROT SERPL-MCNC: 7.3 G/DL (ref 6.4–8.2)
Q-T INTERVAL, ECG07: 416 MS
QRS DURATION, ECG06: 90 MS
QTC CALCULATION (BEZET), ECG08: 439 MS
RBC # BLD AUTO: 4.71 M/UL (ref 4.2–5.3)
SODIUM SERPL-SCNC: 141 MMOL/L (ref 136–145)
TROPONIN I SERPL-MCNC: <0.02 NG/ML (ref 0–0.04)
VENTRICULAR RATE, ECG03: 67 BPM
WBC # BLD AUTO: 6.6 K/UL (ref 4.6–13.2)

## 2020-06-06 PROCEDURE — 70450 CT HEAD/BRAIN W/O DYE: CPT

## 2020-06-06 PROCEDURE — 82550 ASSAY OF CK (CPK): CPT

## 2020-06-06 PROCEDURE — 80053 COMPREHEN METABOLIC PANEL: CPT

## 2020-06-06 PROCEDURE — 85025 COMPLETE CBC W/AUTO DIFF WBC: CPT

## 2020-06-06 PROCEDURE — 74011250637 HC RX REV CODE- 250/637: Performed by: EMERGENCY MEDICINE

## 2020-06-06 PROCEDURE — 93005 ELECTROCARDIOGRAM TRACING: CPT

## 2020-06-06 PROCEDURE — 99285 EMERGENCY DEPT VISIT HI MDM: CPT

## 2020-06-06 RX ORDER — HYDRALAZINE HYDROCHLORIDE 25 MG/1
50 TABLET, FILM COATED ORAL
Status: COMPLETED | OUTPATIENT
Start: 2020-06-06 | End: 2020-06-06

## 2020-06-06 RX ORDER — CLONIDINE HYDROCHLORIDE 0.1 MG/1
0.2 TABLET ORAL
Status: COMPLETED | OUTPATIENT
Start: 2020-06-06 | End: 2020-06-06

## 2020-06-06 RX ADMIN — CLONIDINE HYDROCHLORIDE 0.2 MG: 0.1 TABLET ORAL at 01:24

## 2020-06-06 RX ADMIN — HYDRALAZINE HYDROCHLORIDE 50 MG: 25 TABLET, FILM COATED ORAL at 01:24

## 2020-06-06 NOTE — ED NOTES
Pt given verbal and written d/c instructions. Alert, oriented. No chest pain or sob at present.  Pt ambulated from ED w/o difficulty

## 2020-06-06 NOTE — DISCHARGE INSTRUCTIONS
You were seen and evaluated in the Emergency Department. Please understand that your work up is not all encompassing and you should follow up with your primary care physician for further management and continuity of care. Please return to Emergency Department or seek medical attention immediately if you have acute worsening in your symptoms or develop chest pain, shortness of breath, repeated vomiting, fever, altered level of consciousness, coughing up blood, or start sweating and feel clammy. If you were prescribed any medicine for home, please take as prescribed by your health-care provider. If you were given any follow-up appointments or numbers to call, please do so as instructed. Avoid any tobacco products or excessive alcohol. Patient Education        DASH Diet: Care Instructions  Your Care Instructions     The DASH diet is an eating plan that can help lower your blood pressure. DASH stands for Dietary Approaches to Stop Hypertension. Hypertension is high blood pressure. The DASH diet focuses on eating foods that are high in calcium, potassium, and magnesium. These nutrients can lower blood pressure. The foods that are highest in these nutrients are fruits, vegetables, low-fat dairy products, nuts, seeds, and legumes. But taking calcium, potassium, and magnesium supplements instead of eating foods that are high in those nutrients does not have the same effect. The DASH diet also includes whole grains, fish, and poultry. The DASH diet is one of several lifestyle changes your doctor may recommend to lower your high blood pressure. Your doctor may also want you to decrease the amount of sodium in your diet. Lowering sodium while following the DASH diet can lower blood pressure even further than just the DASH diet alone. Follow-up care is a key part of your treatment and safety. Be sure to make and go to all appointments, and call your doctor if you are having problems.  It's also a good idea to know your test results and keep a list of the medicines you take. How can you care for yourself at home? Following the DASH diet  · Eat 4 to 5 servings of fruit each day. A serving is 1 medium-sized piece of fruit, ½ cup chopped or canned fruit, 1/4 cup dried fruit, or 4 ounces (½ cup) of fruit juice. Choose fruit more often than fruit juice. · Eat 4 to 5 servings of vegetables each day. A serving is 1 cup of lettuce or raw leafy vegetables, ½ cup of chopped or cooked vegetables, or 4 ounces (½ cup) of vegetable juice. Choose vegetables more often than vegetable juice. · Get 2 to 3 servings of low-fat and fat-free dairy each day. A serving is 8 ounces of milk, 1 cup of yogurt, or 1 ½ ounces of cheese. · Eat 6 to 8 servings of grains each day. A serving is 1 slice of bread, 1 ounce of dry cereal, or ½ cup of cooked rice, pasta, or cooked cereal. Try to choose whole-grain products as much as possible. · Limit lean meat, poultry, and fish to 2 servings each day. A serving is 3 ounces, about the size of a deck of cards. · Eat 4 to 5 servings of nuts, seeds, and legumes (cooked dried beans, lentils, and split peas) each week. A serving is 1/3 cup of nuts, 2 tablespoons of seeds, or ½ cup of cooked beans or peas. · Limit fats and oils to 2 to 3 servings each day. A serving is 1 teaspoon of vegetable oil or 2 tablespoons of salad dressing. · Limit sweets and added sugars to 5 servings or less a week. A serving is 1 tablespoon jelly or jam, ½ cup sorbet, or 1 cup of lemonade. · Eat less than 2,300 milligrams (mg) of sodium a day. If you limit your sodium to 1,500 mg a day, you can lower your blood pressure even more. Tips for success  · Start small. Do not try to make dramatic changes to your diet all at once. You might feel that you are missing out on your favorite foods and then be more likely to not follow the plan. Make small changes, and stick with them.  Once those changes become habit, add a few more changes. · Try some of the following:  ? Make it a goal to eat a fruit or vegetable at every meal and at snacks. This will make it easy to get the recommended amount of fruits and vegetables each day. ? Try yogurt topped with fruit and nuts for a snack or healthy dessert. ? Add lettuce, tomato, cucumber, and onion to sandwiches. ? Combine a ready-made pizza crust with low-fat mozzarella cheese and lots of vegetable toppings. Try using tomatoes, squash, spinach, broccoli, carrots, cauliflower, and onions. ? Have a variety of cut-up vegetables with a low-fat dip as an appetizer instead of chips and dip. ? Sprinkle sunflower seeds or chopped almonds over salads. Or try adding chopped walnuts or almonds to cooked vegetables. ? Try some vegetarian meals using beans and peas. Add garbanzo or kidney beans to salads. Make burritos and tacos with mashed jacobs beans or black beans. Where can you learn more? Go to http://angieCoveonile.info/  Enter H967 in the search box to learn more about \"DASH Diet: Care Instructions. \"  Current as of: December 16, 2019               Content Version: 12.5  © 7753-9186 [employer]. Care instructions adapted under license by [employer] (which disclaims liability or warranty for this information). If you have questions about a medical condition or this instruction, always ask your healthcare professional. [place] [address] any warranty or liability for your use of this information. Patient Education        High Blood Pressure: Care Instructions  Overview     It's normal for blood pressure to go up and down throughout the day. But if it stays up, you have high blood pressure. Another name for high blood pressure is hypertension. Despite what a lot of people think, high blood pressure usually doesn't cause headaches or make you feel dizzy or lightheaded. It usually has no symptoms.  But it does increase your risk of stroke, heart attack, and other problems. You and your doctor will talk about your risks of these problems based on your blood pressure. Your doctor will give you a goal for your blood pressure. Your goal will be based on your health and your age. Lifestyle changes, such as eating healthy and being active, are always important to help lower blood pressure. You might also take medicine to reach your blood pressure goal.  Follow-up care is a key part of your treatment and safety. Be sure to make and go to all appointments, and call your doctor if you are having problems. It's also a good idea to know your test results and keep a list of the medicines you take. How can you care for yourself at home? Medical treatment  · If you stop taking your medicine, your blood pressure will go back up. You may take one or more types of medicine to lower your blood pressure. Be safe with medicines. Take your medicine exactly as prescribed. Call your doctor if you think you are having a problem with your medicine. · Talk to your doctor before you start taking aspirin every day. Aspirin can help certain people lower their risk of a heart attack or stroke. But taking aspirin isn't right for everyone, because it can cause serious bleeding. · See your doctor regularly. You may need to see the doctor more often at first or until your blood pressure comes down. · If you are taking blood pressure medicine, talk to your doctor before you take decongestants or anti-inflammatory medicine, such as ibuprofen. Some of these medicines can raise blood pressure. · Learn how to check your blood pressure at home. Lifestyle changes  · Stay at a healthy weight. This is especially important if you put on weight around the waist. Losing even 10 pounds can help you lower your blood pressure. · If your doctor recommends it, get more exercise. Walking is a good choice. Bit by bit, increase the amount you walk every day.  Try for at least 30 minutes on most days of the week. You also may want to swim, bike, or do other activities. · Avoid or limit alcohol. Talk to your doctor about whether you can drink any alcohol. · Try to limit how much sodium you eat to less than 2,300 milligrams (mg) a day. Your doctor may ask you to try to eat less than 1,500 mg a day. · Eat plenty of fruits (such as bananas and oranges), vegetables, legumes, whole grains, and low-fat dairy products. · Lower the amount of saturated fat in your diet. Saturated fat is found in animal products such as milk, cheese, and meat. Limiting these foods may help you lose weight and also lower your risk for heart disease. · Do not smoke. Smoking increases your risk for heart attack and stroke. If you need help quitting, talk to your doctor about stop-smoking programs and medicines. These can increase your chances of quitting for good. When should you call for help? Call  911 anytime you think you may need emergency care. This may mean having symptoms that suggest that your blood pressure is causing a serious heart or blood vessel problem. Your blood pressure may be over 180/120. For example, call 911 if:  · You have symptoms of a heart attack. These may include:  ? Chest pain or pressure, or a strange feeling in the chest.  ? Sweating. ? Shortness of breath. ? Nausea or vomiting. ? Pain, pressure, or a strange feeling in the back, neck, jaw, or upper belly or in one or both shoulders or arms. ? Lightheadedness or sudden weakness. ? A fast or irregular heartbeat. · You have symptoms of a stroke. These may include:  ? Sudden numbness, tingling, weakness, or loss of movement in your face, arm, or leg, especially on only one side of your body. ? Sudden vision changes. ? Sudden trouble speaking. ? Sudden confusion or trouble understanding simple statements. ? Sudden problems with walking or balance. ? A sudden, severe headache that is different from past headaches.   · You have severe back or belly pain. Do not wait until your blood pressure comes down on its own. Get help right away. Call your doctor now or seek immediate care if:  · Your blood pressure is much higher than normal (such as 180/120 or higher), but you don't have symptoms. · You think high blood pressure is causing symptoms, such as:  ? Severe headache.  ? Blurry vision. Watch closely for changes in your health, and be sure to contact your doctor if:  · Your blood pressure measures higher than your doctor recommends at least 2 times. That means the top number is higher or the bottom number is higher, or both. · You think you may be having side effects from your blood pressure medicine. Where can you learn more? Go to http://angie-nile.info/  Enter [id number] in the search box to learn more about \"High Blood Pressure: Care Instructions. \"  Current as of: December 16, 2019               Content Version: 12.5  © 1834-6208 [employer]. Care instructions adapted under license by [employer] (which disclaims liability or warranty for this information). If you have questions about a medical condition or this instruction, always ask your healthcare professional. [place] [address] any warranty or liability for your use of this information. Patient Education        Acute High Blood Pressure: Care Instructions  Your Care Instructions     Acute high blood pressure is very high blood pressure. It's a serious problem. Very high blood pressure can damage your brain, heart, eyes, and kidneys. You may have been given medicines to lower your blood pressure. You may have gotten them as pills or through a needle in one of your veins. This is called an IV. And maybe you were given other medicines too. These can be needed when high blood pressure causes other problems. To keep your blood pressure at a lower level, you may need to make healthy lifestyle changes.  And you will probably need to take medicines. Be sure to follow up with your doctor about your blood pressure and what you can do about it. Follow-up care is a key part of your treatment and safety. Be sure to make and go to all appointments, and call your doctor if you are having problems. It's also a good idea to know your test results and keep a list of the medicines you take. How can you care for yourself at home? · See your doctor as often as he or she recommends. This is to make sure your blood pressure is under control. You will probably go at least 2 times a year. But it may be more often at first.  · Take your blood pressure medicine exactly as prescribed. You may take one or more types. They include diuretics, beta-blockers, ACE inhibitors, calcium channel blockers, and angiotensin II receptor blockers. Call your doctor if you think you are having a problem with your medicine. · If you take blood pressure medicine, talk to your doctor before you take decongestants or anti-inflammatory medicine, such as ibuprofen. These can raise blood pressure. · Learn how to check your blood pressure at home. Check it often. · Ask your doctor if it's okay to drink alcohol. · Talk to your doctor about lifestyle changes that can help blood pressure. These include being active and managing your weight. · Don't smoke. Smoking increases your risk for heart attack and stroke. When should you call for help? Call  911 anytime you think you may need emergency care. This may mean having symptoms that suggest that your blood pressure is causing a serious heart or blood vessel problem. Your blood pressure may be over 180/120. For example, call 911 if:  · You have symptoms of a heart attack. These may include:  ? Chest pain or pressure, or a strange feeling in the chest.  ? Sweating. ? Shortness of breath. ? Nausea or vomiting.   ? Pain, pressure, or a strange feeling in the back, neck, jaw, or upper belly or in one or both shoulders or arms. ? Lightheadedness or sudden weakness. ? A fast or irregular heartbeat. · You have symptoms of a stroke. These may include:  ? Sudden numbness, tingling, weakness, or loss of movement in your face, arm, or leg, especially on only one side of your body. ? Sudden vision changes. ? Sudden trouble speaking. ? Sudden confusion or trouble understanding simple statements. ? Sudden problems with walking or balance. ? A sudden, severe headache that is different from past headaches. · You have severe back or belly pain. Do not wait until your blood pressure comes down on its own. Get help right away. Call your doctor now or seek immediate care if:  · Your blood pressure is much higher than normal (such as 180/120 or higher), but you don't have symptoms. · You think high blood pressure is causing symptoms, such as:  ? Severe headache.  ? Blurry vision. Watch closely for changes in your health, and be sure to contact your doctor if:  · Your blood pressure measures higher than your doctor recommends at least 2 times. That means the top number is higher or the bottom number is higher, or both. · You think you may be having side effects from your blood pressure medicine. Where can you learn more? Go to http://angie-nile.info/  Enter H919 in the search box to learn more about \"Acute High Blood Pressure: Care Instructions. \"  Current as of: December 16, 2019               Content Version: 12.5  © 5043-9643 [employer]. Care instructions adapted under license by [employer] (which disclaims liability or warranty for this information). If you have questions about a medical condition or this instruction, always ask your healthcare professional. [address] any warranty or liability for your use of this information.

## 2020-06-06 NOTE — ED PROVIDER NOTES
EMERGENCY DEPARTMENT HISTORY AND PHYSICAL EXAM    Date: 6/6/2020  Patient Name: Eugenio Mcwilliams    History of Presenting Illness     Chief Complaint   Patient presents with    Hypertension         History Provided By: Patient    Additional History (Context):   Eugenio Mcwilliams is a 60 y.o. female with PMHX hypertension, chronic neck and shoulder pain presents to the emergency department C/O elevated blood pressure. States that she has been compliant on her for oral antihypertensives. Patient reports intermittent headaches. States that over the last 3 days states she has noted that her blood pressure has been elevated in the 200s. pt denies numbness, tingling, shortness of breath, nausea, vomiting, abdominal pain, and any other sxs or complaints. Been eating and drinking normally, has had no difficulties in urinating. PCP: Christiano Omer MD    Current Outpatient Medications   Medication Sig Dispense Refill    predniSONE (STERAPRED DS) 10 mg dose pack Use per pack instructions. 21 Tab 0    hydrOXYzine HCl (ATARAX) 25 mg tablet Take 1 Tab by mouth every eight (8) hours as needed for Itching (itching, irritation, anxiety - PRN opiate withdrawl) for up to 12 doses. 12 Tab 0    cloNIDine HCl (CATAPRES) 0.1 mg tablet Take 1 Tab by mouth every twelve (12) hours as needed (restlessness, hot flashes, sweats - PRN opiate withdrawl) for up to 8 doses. 8 Tab 0    promethazine (PHENERGAN) 12.5 mg tablet Take 1 Tab by mouth every eight (8) hours as needed for Nausea (nausea, vomiting - opiate withdrawl PRN) for up to 12 doses. 12 Tab 0    oxyCODONE-acetaminophen (PERCOCET)  mg per tablet Take 1 Tab by mouth every four to six (4-6) hours as needed for Pain for up to 30 days. Max Daily Amount: 5 Tabs. Indications: pain 150 Tab 0    cyclobenzaprine (FLEXERIL) 5 mg tablet Take 1 Tab by mouth three (3) times daily as needed for Muscle Spasm(s).  90 Tab 2    lidocaine (LIDODERM) 5 % 1 Patch by TransDERmal route every twenty-four (24) hours. 30 Patch 2    DULoxetine (CYMBALTA) 30 mg capsule Take 1 Cap by mouth two (2) times a day. 60 Cap 2    pregabalin (LYRICA) 50 mg capsule Take 1 Cap by mouth two (2) times a day. Max Daily Amount: 100 mg. 60 Cap 2    ondansetron hcl (ZOFRAN) 4 mg tablet Take 4 mg by mouth as needed.  ALPRAZolam (XANAX) 1 mg tablet Take 1 mg by mouth two (2) times a day.  0    TENS Units (TENS 504) gladys Please provide patient TENS unit device and pads for chronic neck pain to help improve quality of life and functioning and to decrease pain and medication use. 1 Device 0    cloNIDine HCl (CATAPRES) 0.1 mg tablet Take 0.1 mg by mouth two (2) times a day.  hydrALAZINE (APRESOLINE) 50 mg tablet Take 25 mg by mouth three (3) times daily.  ezetimibe (ZETIA) 10 mg tablet Take 10 mg by mouth daily. 0    rosuvastatin (CRESTOR) 10 mg tablet Take 10 mg by mouth nightly.  levothyroxine (SYNTHROID) 175 mcg tablet Take 175 mcg by mouth Daily (before breakfast).          Past History     Past Medical History:  Past Medical History:   Diagnosis Date    Brachial neuritis or radiculitis NOS     Calculus of kidney     Cervicalgia     Degeneration of cervical intervertebral disc     GERD (gastroesophageal reflux disease)     Hypertension     Ill-defined condition     Gout    Pancreatitis 2012    [facility]roid cancer ([facility] [address]        Past Surgical History:  Past Surgical History:   Procedure Laterality Date    HX BREAST BIOPSY      Negative    HX CHOLECYSTECTOMY      HX HYSTERECTOMY      HX LITHOTRIPSY      HX OOPHORECTOMY      HX THYROIDECTOMY      Partial thyroidectomy       Family History:  Family History   Problem Relation Age of Onset    Diabetes Mother     Breast Cancer Mother     Heart Attack Other     Diabetes Sister     Diabetes Brother        Social History:  Social History     Tobacco Use    Smoking status: Never Smoker    Smokeless tobacco: Never Used   Substance Use Topics  Alcohol use: No    Drug use: No       Allergies: Allergies   Allergen Reactions    Gabapentin Other (comments)     Body numbness/tingling  Other reaction(s): neurological reaction    Oxycontin [Oxycodone] Not Reported This Time     Pt states not allergic to oxycontin           Review of Systems   Review of Systems   Constitutional: Negative for chills and fever. HENT: Negative for congestion, ear pain, sinus pain and sore throat. Eyes: Negative for pain and visual disturbance. Respiratory: Negative for cough and shortness of breath. Cardiovascular: Negative for chest pain and leg swelling. Gastrointestinal: Negative for abdominal pain, constipation, diarrhea, nausea and vomiting. Genitourinary: Negative for dysuria, hematuria, vaginal bleeding and vaginal discharge. Musculoskeletal: Negative for back pain and neck pain. Skin: Negative for rash and wound. Neurological: Positive for headaches. Negative for dizziness, tremors, weakness, light-headedness and numbness. All other systems reviewed and are negative. Physical Exam     Vitals:    06/06/20 0105 06/06/20 0110 06/06/20 0133 06/06/20 0207   BP:  (!) 242/118 (!) 221/115 (!) 176/95   Pulse:  76 68 65   Resp:  24 18 21   Temp:       SpO2:  98% 97% 96%   Weight:       Height: 5' 11\" (1.803 m)        Physical Exam    Nursing note and vitals reviewed    Constitutional: Elderly -American female, appears anxious  Head: Normocephalic, Atraumatic  Eyes: Pupils are equal, round, and reactive to light, EOMI  Neck: Supple, non-tender  Cardiovascular: Regular rate and rhythm, no murmurs, rubs, or gallops  Chest: Normal work of breathing and chest excursion bilaterally  Lungs: Clear to ausculation bilaterally, no wheezes, no rhonchi  Abdomen: Soft, non tender, non distended, normoactive bowel sounds  Back: No evidence of trauma or deformity  Extremities: No evidence of trauma or deformity, no LE edema.  No streaking erythema, vesicular lesions, ulcerations or bulla  Skin: Warm and dry, normal cap refill  Neuro: Alert and appropriate, CN intact, normal speech, moving all 4 extremities freely and symmetrically       Diagnostic Study Results     Labs -     Recent Results (from the past 12 hour(s))   CBC WITH AUTOMATED DIFF    Collection Time: 06/06/20  1:15 AM   Result Value Ref Range    WBC 6.6 4.6 - 13.2 K/uL    RBC 4.71 4.20 - 5.30 M/uL    HGB 14.5 12.0 - 16.0 g/dL    HCT 46.1 (H) 35.0 - 45.0 %    MCV 97.9 (H) 74.0 - 97.0 FL    MCH 30.8 24.0 - 34.0 PG    MCHC 31.5 31.0 - 37.0 g/dL    RDW 13.2 11.6 - 14.5 %    PLATELET 235 488 - 784 K/uL    MPV 10.7 9.2 - 11.8 FL    NEUTROPHILS 48 40 - 73 %    LYMPHOCYTES 44 21 - 52 %    MONOCYTES 7 3 - 10 %    EOSINOPHILS 1 0 - 5 %    BASOPHILS 0 0 - 2 %    ABS. NEUTROPHILS 3.2 1.8 - 8.0 K/UL    ABS. LYMPHOCYTES 2.9 0.9 - 3.6 K/UL    ABS. MONOCYTES 0.5 0.05 - 1.2 K/UL    ABS. EOSINOPHILS 0.0 0.0 - 0.4 K/UL    ABS. BASOPHILS 0.0 0.0 - 0.1 K/UL    DF AUTOMATED     METABOLIC PANEL, COMPREHENSIVE    Collection Time: 06/06/20  1:15 AM   Result Value Ref Range    Sodium 141 136 - 145 mmol/L    Potassium 3.6 3.5 - 5.5 mmol/L    Chloride 110 100 - 111 mmol/L    CO2 26 21 - 32 mmol/L    Anion gap 5 3.0 - 18 mmol/L    Glucose 107 (H) 74 - 99 mg/dL    BUN 20 (H) 7.0 - 18 MG/DL    Creatinine 0.92 0.6 - 1.3 MG/DL    BUN/Creatinine ratio 22 (H) 12 - 20      GFR est AA >60 >60 ml/min/1.73m2    GFR est non-AA >60 >60 ml/min/1.73m2    Calcium 8.9 8.5 - 10.1 MG/DL    Bilirubin, total 0.6 0.2 - 1.0 MG/DL    ALT (SGPT) 19 13 - 56 U/L    AST (SGOT) 17 10 - 38 U/L    Alk.  phosphatase 97 45 - 117 U/L    Protein, total 7.3 6.4 - 8.2 g/dL    Albumin 4.0 3.4 - 5.0 g/dL    Globulin 3.3 2.0 - 4.0 g/dL    A-G Ratio 1.2 0.8 - 1.7     CARDIAC PANEL,(CK, CKMB & TROPONIN)    Collection Time: 06/06/20  1:15 AM   Result Value Ref Range    CK - MB 1.3 <3.6 ng/ml    CK-MB Index 0.7 0.0 - 4.0 %     26 - 192 U/L    Troponin-I, QT <0.02 0.0 - 0.045 NG/ML   EKG, 12 LEAD, INITIAL    Collection Time: 06/06/20  1:29 AM   Result Value Ref Range    Ventricular Rate 67 BPM    Atrial Rate 67 BPM    P-R Interval 190 ms    QRS Duration 90 ms    Q-T Interval 416 ms    QTC Calculation (Bezet) 439 ms    Calculated P Axis 68 degrees    Calculated R Axis 66 degrees    Calculated T Axis 70 degrees    Diagnosis       Normal sinus rhythm  Normal ECG  When compared with ECG of 21-MAR-2017 12:15,  No significant change was found         Radiologic Studies -   CT HEAD WO CONT   Final Result   IMPRESSION:      No acute intracranial abnormalities. CT Results  (Last 48 hours)               06/06/20 0148  CT HEAD WO CONT Final result    Impression:  IMPRESSION:       No acute intracranial abnormalities. Narrative:  EXAM: CT head       INDICATION: Hypertension, headache. COMPARISON: October 23, 2010       TECHNIQUE: Axial CT imaging of the head was performed without intravenous   contrast. One or more dose reduction techniques were used on this CT: automated   exposure control, adjustment of the mAs and/or kVp according to patient size,   and iterative reconstruction techniques. The specific techniques used on this   CT exam have been documented in the patient's electronic medical record. Digital   Imaging and Communications in the Medicine (DICOM) format image data are   available to nonaffiliated external healthcare facilities or entities on a   secure, media free, reciprocally searchable basis with patient authorization for   at least a 12 month period after this study. _______________       FINDINGS:       BRAIN AND POSTERIOR FOSSA: The sulci, folia, ventricles and basal cisterns are   within normal limits for the patient's age. There is no intracranial hemorrhage,   mass effect, or midline shift. There are no areas of abnormal parenchymal   attenuation. EXTRA-AXIAL SPACES AND MENINGES: There are no abnormal extra-axial fluid   collections. CALVARIUM: Intact. SINUSES: Clear. OTHER: None.       _______________               CXR Results  (Last 48 hours)    None            Medical Decision Making   I am the first provider for this patient. I reviewed the vital signs, available nursing notes, past medical history, past surgical history, family history and social history. Vital Signs-Reviewed the patient's vital signs. Pulse Oximetry Analysis -98 % on room air    Cardiac Monitor:  Rate: 83 bpm  Rhythm: Regular    EKG interpretation: (Preliminary)  1:03 AM   Normal sinus rhythm at 67 beats minute. QTc 439 ms. No acute ST elevation  Records Reviewed: Nursing Notes and Old Medical Records    Provider Notes:   60 y.o. female with a history of hypertension presenting with elevated high blood pressure and headaches. On exam patient is very hypertensive with blood pressure 248/114. However she does not have any focal neurological deficits. As patient blood pressure is concerning for hypertensive urgency, will obtain CT scan of her head. Will obtain labs to eval for any organ damage. Will give the patient extra dose of her clonidine and hydralazine and reassess her blood pressure. Procedures:  Procedures    ED Course:   1:03 AM   Initial assessment performed. The patients presenting problems have been discussed, and they are in agreement with the care plan formulated and outlined with them. I have encouraged them to ask questions as they arise throughout their visit. 2:11 AM  CT scan of her head showing no acute intracranial abnormality. Lab work showing no endorgan damage. Patient's blood pressure responsive with her oral antihypertensive. Patient urged close follow-up with her PCP to discuss possible increases to her antihypertensive medication. Diagnosis and Disposition       DISCHARGE NOTE:  2:11 AM    Parish Hernandez  results have been reviewed with her.   She has been counseled regarding her diagnosis, treatment, and plan. She verbally conveys understanding and agreement of the signs, symptoms, diagnosis, treatment and prognosis and additionally agrees to follow up as discussed. She also agrees with the care-plan and conveys that all of her questions have been answered. I have also provided discharge instructions for her that include: educational information regarding their diagnosis and treatment, and list of reasons why they would want to return to the ED prior to their follow-up appointment, should her condition change. She has been provided with education for proper emergency department utilization. CLINICAL IMPRESSION:    1. Essential hypertension    2. Nonintractable episodic headache, unspecified headache type        PLAN:  1. D/C Home  2. Current Discharge Medication List        3. Follow-up Information     Follow up With Specialties Details Why Contact Info    Sandoval Ledezma MD Internal Medicine Schedule an appointment as soon as possible for a visit in 2 days  [facility] [id number]  [phone]      [facility] EMERGENCY DEPT Emergency Medicine  As needed if symptoms worsen [address] [id number]  [phone]        ____________________________________     Please note that this dictation was completed with [email or web address], the computer voice recognition software. Quite often unanticipated grammatical, syntax, homophones, and other interpretive errors are inadvertently transcribed by the computer software. Please disregard these errors. Please excuse any errors that have escaped final proofreading.

## (undated) DEVICE — CUFF BLD PRESSURE MONITORING LNG AD 23-33 CM 1 TUBE MY CUF

## (undated) DEVICE — SET EXTN SM 0.5ML L13IN BOR W/O INJ SITE

## (undated) DEVICE — TRAY SUPP STD NO DRUG W EXTENSION SET

## (undated) DEVICE — SOLUTION IV 250ML 0.9% SOD CHL CLR INJ FLX BG CONT PRT CLSR

## (undated) DEVICE — MIRAGE SWIFT II PILLOW LGE: Brand: MIRAGE SWIFT II

## (undated) DEVICE — (D)BNDG ADHESIVE FABRIC 3/4X3 -- DISC BY MFR USE ITEM 357960

## (undated) DEVICE — SYR 10ML CTRL LR LCK NSAF LF --

## (undated) DEVICE — DRAPE TWL SURG 16X26IN BLU ORB04] ALLCARE INC]